# Patient Record
Sex: MALE | Race: WHITE | NOT HISPANIC OR LATINO | Employment: FULL TIME | URBAN - METROPOLITAN AREA
[De-identification: names, ages, dates, MRNs, and addresses within clinical notes are randomized per-mention and may not be internally consistent; named-entity substitution may affect disease eponyms.]

---

## 2017-02-16 ENCOUNTER — OFFICE VISIT (OUTPATIENT)
Dept: FAMILY MEDICINE | Facility: CLINIC | Age: 52
End: 2017-02-16
Payer: COMMERCIAL

## 2017-02-16 VITALS
WEIGHT: 275 LBS | TEMPERATURE: 96.7 F | HEART RATE: 68 BPM | DIASTOLIC BLOOD PRESSURE: 74 MMHG | HEIGHT: 72 IN | BODY MASS INDEX: 37.25 KG/M2 | SYSTOLIC BLOOD PRESSURE: 124 MMHG

## 2017-02-16 DIAGNOSIS — Z00.00 ROUTINE GENERAL MEDICAL EXAMINATION AT A HEALTH CARE FACILITY: Primary | ICD-10-CM

## 2017-02-16 DIAGNOSIS — Z79.899 CONTROLLED SUBSTANCE AGREEMENT SIGNED: ICD-10-CM

## 2017-02-16 DIAGNOSIS — F98.8 ADD (ATTENTION DEFICIT DISORDER): ICD-10-CM

## 2017-02-16 DIAGNOSIS — Z11.59 NEED FOR HEPATITIS C SCREENING TEST: ICD-10-CM

## 2017-02-16 DIAGNOSIS — Z12.5 SCREENING PSA (PROSTATE SPECIFIC ANTIGEN): ICD-10-CM

## 2017-02-16 DIAGNOSIS — E66.01 MORBID OBESITY, UNSPECIFIED OBESITY TYPE (H): ICD-10-CM

## 2017-02-16 DIAGNOSIS — Z13.6 CARDIOVASCULAR SCREENING; LDL GOAL LESS THAN 160: ICD-10-CM

## 2017-02-16 LAB
CHOLEST SERPL-MCNC: 188 MG/DL
GLUCOSE SERPL-MCNC: 133 MG/DL (ref 70–99)
HCV AB SERPL QL IA: NORMAL
HDLC SERPL-MCNC: 78 MG/DL
LDLC SERPL CALC-MCNC: 96 MG/DL
NONHDLC SERPL-MCNC: 110 MG/DL
PSA SERPL-ACNC: 0.48 UG/L (ref 0–4)
T4 FREE SERPL-MCNC: 1.34 NG/DL (ref 0.76–1.46)
TRIGL SERPL-MCNC: 69 MG/DL
TSH SERPL DL<=0.005 MIU/L-ACNC: 0.3 MU/L (ref 0.4–4)

## 2017-02-16 PROCEDURE — 82947 ASSAY GLUCOSE BLOOD QUANT: CPT | Performed by: FAMILY MEDICINE

## 2017-02-16 PROCEDURE — 36415 COLL VENOUS BLD VENIPUNCTURE: CPT | Performed by: FAMILY MEDICINE

## 2017-02-16 PROCEDURE — 86803 HEPATITIS C AB TEST: CPT | Performed by: FAMILY MEDICINE

## 2017-02-16 PROCEDURE — 80061 LIPID PANEL: CPT | Performed by: FAMILY MEDICINE

## 2017-02-16 PROCEDURE — G0103 PSA SCREENING: HCPCS | Performed by: FAMILY MEDICINE

## 2017-02-16 PROCEDURE — 99396 PREV VISIT EST AGE 40-64: CPT | Performed by: FAMILY MEDICINE

## 2017-02-16 PROCEDURE — 99212 OFFICE O/P EST SF 10 MIN: CPT | Mod: 25 | Performed by: FAMILY MEDICINE

## 2017-02-16 PROCEDURE — 84439 ASSAY OF FREE THYROXINE: CPT | Performed by: FAMILY MEDICINE

## 2017-02-16 PROCEDURE — 84443 ASSAY THYROID STIM HORMONE: CPT | Performed by: FAMILY MEDICINE

## 2017-02-16 RX ORDER — DEXTROAMPHETAMINE SACCHARATE, AMPHETAMINE ASPARTATE MONOHYDRATE, DEXTROAMPHETAMINE SULFATE AND AMPHETAMINE SULFATE 5; 5; 5; 5 MG/1; MG/1; MG/1; MG/1
20 CAPSULE, EXTENDED RELEASE ORAL DAILY
Qty: 30 CAPSULE | Refills: 0 | Status: SHIPPED | OUTPATIENT
Start: 2017-05-22 | End: 2017-05-04

## 2017-02-16 RX ORDER — DEXTROAMPHETAMINE SACCHARATE, AMPHETAMINE ASPARTATE, DEXTROAMPHETAMINE SULFATE AND AMPHETAMINE SULFATE 2.5; 2.5; 2.5; 2.5 MG/1; MG/1; MG/1; MG/1
TABLET ORAL
Qty: 30 TABLET | Refills: 0 | Status: SHIPPED | OUTPATIENT
Start: 2017-05-22 | End: 2017-05-04

## 2017-02-16 RX ORDER — DEXTROAMPHETAMINE SACCHARATE, AMPHETAMINE ASPARTATE, DEXTROAMPHETAMINE SULFATE AND AMPHETAMINE SULFATE 2.5; 2.5; 2.5; 2.5 MG/1; MG/1; MG/1; MG/1
TABLET ORAL
Qty: 30 TABLET | Refills: 0 | Status: SHIPPED | OUTPATIENT
Start: 2017-04-22 | End: 2017-02-16

## 2017-02-16 RX ORDER — DEXTROAMPHETAMINE SACCHARATE, AMPHETAMINE ASPARTATE MONOHYDRATE, DEXTROAMPHETAMINE SULFATE AND AMPHETAMINE SULFATE 5; 5; 5; 5 MG/1; MG/1; MG/1; MG/1
20 CAPSULE, EXTENDED RELEASE ORAL DAILY
Qty: 30 CAPSULE | Refills: 0 | Status: SHIPPED | OUTPATIENT
Start: 2017-03-22 | End: 2017-02-16

## 2017-02-16 RX ORDER — DEXTROAMPHETAMINE SACCHARATE, AMPHETAMINE ASPARTATE MONOHYDRATE, DEXTROAMPHETAMINE SULFATE AND AMPHETAMINE SULFATE 5; 5; 5; 5 MG/1; MG/1; MG/1; MG/1
20 CAPSULE, EXTENDED RELEASE ORAL DAILY
Qty: 30 CAPSULE | Refills: 0 | Status: SHIPPED | OUTPATIENT
Start: 2017-04-22 | End: 2017-02-16

## 2017-02-16 RX ORDER — DEXTROAMPHETAMINE SACCHARATE, AMPHETAMINE ASPARTATE, DEXTROAMPHETAMINE SULFATE AND AMPHETAMINE SULFATE 2.5; 2.5; 2.5; 2.5 MG/1; MG/1; MG/1; MG/1
TABLET ORAL
Qty: 30 TABLET | Refills: 0 | Status: SHIPPED | OUTPATIENT
Start: 2017-03-22 | End: 2017-02-16

## 2017-02-16 NOTE — MR AVS SNAPSHOT
After Visit Summary   2/16/2017    Orlando Holder    MRN: [id number]           Patient Information     Date Of Birth          1965        Visit Information        Provider Department      2/16/2017 7:00 AM Kay Ramirez MD [facility] [place]        Today's Diagnoses     Routine general medical examination at a health care facility    -  1    Need for hepatitis C screening test        CARDIOVASCULAR SCREENING; LDL GOAL LESS THAN 160        Screening PSA (prostate specific antigen)        ADD (attention deficit disorder)        Controlled substance agreement signed        BMI 37.0-37.9, adult        Morbid drug-induced obesity          Care Instructions      Preventive Health Recommendations  Male Ages 50 - 64    Yearly exam:             See your health care provider every year in order to  o   Review health changes.   o   Discuss preventive care.    o   Review your medicines if your doctor has prescribed any.     Have a cholesterol test every 5 years, or more frequently if you are at risk for high cholesterol/heart disease.     Have a diabetes test (fasting glucose) every three years. If you are at risk for diabetes, you should have this test more often.     Have a colonoscopy at age 50, or have a yearly FIT test (stool test). These exams will check for colon cancer.      Talk with your health care provider about whether or not a prostate cancer screening test (PSA) is right for you.    You should be tested each year for STDs (sexually transmitted diseases), if you re at risk.     Shots: Get a flu shot each year. Get a tetanus shot every 10 years.     Nutrition:    Eat at least 5 servings of fruits and vegetables daily.     Eat whole-grain bread, whole-wheat pasta and brown rice instead of white grains and rice.     Talk to your provider about Calcium and Vitamin D.     Lifestyle    Exercise for at least 150 minutes a week (30 minutes a day, 5 days a week). This will help  "you control your weight and prevent disease.     Limit alcohol to one drink per day.     No smoking.     Wear sunscreen to prevent skin cancer.     See your dentist every six months for an exam and cleaning.     See your eye doctor every 1 to 2 years.          Follow-ups after your visit        Additional Services     WEIGHT MANAGEMENT/ [facility] LIFESTYLE PROGRAM REFERRAL       To schedule an appointment, please call the [facility] at [phone].                  Who to contact     If you have questions or need follow up information about today's clinic visit or your schedule please contact [facility] [place] directly at [phone].  Normal or non-critical lab and imaging results will be communicated to you by [employer], letter or phone within 4 business days after the clinic has received the results. If you do not hear from us within 7 days, please contact the clinic through [employer] or phone. If you have a critical or abnormal lab result, we will notify you by phone as soon as possible.  Submit refill requests through [employer] or call your pharmacy and they will forward the refill request to us. Please allow 3 business days for your refill to be completed.          Additional Information About Your Visit        [employer] Information     [employer] gives you secure access to your electronic health record. If you see a primary care provider, you can also send messages to your care team and make appointments. If you have questions, please call your primary care clinic.  If you do not have a primary care provider, please call [phone] and they will assist you.        Care EveryWhere ID     This is your Care EveryWhere ID. This could be used by other organizations to access your [facility] medical records  [id number]        Your Vitals Were     Pulse Temperature Height BMI (Body Mass Index)          68 96.7  F (35.9  C) (Tympanic) 5' 11.65\" (1.82 m) 37.66 kg/m2         Blood Pressure from Last 3 " Encounters:   02/16/17 124/74   12/05/16 120/80   05/27/16 126/80    Weight from Last 3 Encounters:   02/16/17 275 lb (124.7 kg)   12/05/16 275 lb 12.8 oz (125.1 kg)   05/27/16 277 lb (125.6 kg)              We Performed the Following     Glucose     Hepatitis C Screen Reflex to HCV RNA Quant and Genotype     Lipid Profile (Chol, Trig, HDL, LDL calc)     Prostate spec antigen screen     TSH with free T4 reflex     WEIGHT MANAGEMENT/ [facility] LIFESTYLE PROGRAM REFERRAL          Today's Medication Changes          These changes are accurate as of: 2/16/17  7:50 AM.  If you have any questions, ask your nurse or doctor.               Start taking these medicines.        Dose/Directions    * amphetamine-dextroamphetamine 20 MG per 24 hr capsule   Commonly known as:  ADDERALL XR   Used for:  ADD (attention deficit disorder), Controlled substance agreement signed   Started by:  Kay Ramirez MD        Dose:  20 mg   Start taking on:  5/22/2017   Take 1 capsule (20 mg) by mouth daily   Quantity:  30 capsule   Refills:  0       * amphetamine-dextroamphetamine 10 MG per tablet   Commonly known as:  ADDERALL   Used for:  ADD (attention deficit disorder), Controlled substance agreement signed   Started by:  Kay Ramirez MD        Start taking on:  5/22/2017   Take one tablet at 1 pm   Quantity:  30 tablet   Refills:  0       * Notice:  This list has 2 medication(s) that are the same as other medications prescribed for you. Read the directions carefully, and ask your doctor or other care provider to review them with you.         Where to get your medicines      Some of these will need a paper prescription and others can be bought over the counter.  Ask your nurse if you have questions.     Bring a paper prescription for each of these medications     amphetamine-dextroamphetamine 10 MG per tablet    amphetamine-dextroamphetamine 20 MG per 24 hr capsule                Primary Care Provider Office Phone # Fax #     Kay Ramirez [phone]       [facility] [address] [place] [address] [address]  [place] [address]        Thank you!     [facility] you for [facility]  [facility] your [facility]. Our goal is always to provide you with excellent care. Hearing back from our patients is one way we can continue to improve our services. Please take a few minutes to complete the written survey that you may receive in the mail after your visit with us. Thank you!             Your Updated Medication List - Protect others around you: Learn how to safely use, store and throw away your medicines at www.disposemymeds.org.          This list is accurate as of: 2/16/17  7:50 AM.  Always use your most recent med list.                   Brand Name Dispense Instructions for use    ADVAIR DISKUS 500-50 MCG/DOSE diskus inhaler   Generic drug:  fluticasone-salmeterol     1 Inhaler    INHALE 1 PUFF BY MOUTH TWICE DAILY. RINSE/GARGLE AFTER USE       albuterol 108 (90 BASE) MCG/ACT Inhaler    albuterol    1 Inhaler    Inhale 1-2 puffs into the lungs 4 times daily as needed       * amphetamine-dextroamphetamine 20 MG per 24 hr capsule   Start taking on:  5/22/2017    ADDERALL XR    30 capsule    Take 1 capsule (20 mg) by mouth daily       * amphetamine-dextroamphetamine 10 MG per tablet   Start taking on:  5/22/2017    ADDERALL    30 tablet    Take one tablet at 1 pm       BIFIDOBACTERIUM BIFIDUM          fish oil-omega-3 fatty acids 1000 MG capsule      Take 2 g by mouth daily.       fluticasone 50 MCG/ACT spray    FLONASE    48 g    Spray 1-2 sprays into both nostrils daily       GLUCOSAMINE 1500 COMPLEX PO      Take 1 tablet by mouth daily.       montelukast 10 MG tablet    SINGULAIR    30 tablet    TAKE 1 TABLET (10 MG) BY MOUTH DAILY       Multi-vitamin Tabs tablet   Generic drug:  multivitamin, therapeutic with minerals      1 tab q  day       predniSONE 20 MG tablet    DELTASONE    20 tablet    Take 60 mg daily for  3 days, then 40 mg daily for 3 days, then 20 mg daily for 3 days, then 10 mg for 4 days       sildenafil 100 MG cap/tab    VIAGRA    10 tablet    Take 1 tablet by mouth daily as needed for erectile dysfunction.       VITAMIN D PO      Take  by mouth. 5000 units       WAL-ITIN 10 MG tablet   Generic drug:  loratadine     30    1 TABLET DAILY       * Notice:  This list has 2 medication(s) that are the same as other medications prescribed for you. Read the directions carefully, and ask your doctor or other care provider to review them with you.

## 2017-02-16 NOTE — PROGRESS NOTES
SUBJECTIVE:     CC: Orlando Holder is an 52 year old male who presents for preventative health visit.     Healthy Habits:    Do you get at least three servings of calcium containing foods daily (dairy, green leafy vegetables, etc.)? yes    Amount of exercise or daily activities, outside of work: Varies - 2-3 day(s) every 2 week    Problems taking medications regularly No    Medication side effects: No    Have you had an eye exam in the past two years? yes    Do you see a dentist twice per year? yes    Do you have sleep apnea, excessive snoring or daytime drowsiness?yes        PROBLEMS TO ADD ON...  Feels frustrated not be able to loose weight although admits he needs to improve his exercise routine, but he does try to eat well and his eating has not changed too much, but could use help. He was feeling more tired earlier although slightly better last couple of weeks. Had some  thyroid issue few yrs ago but he thinks his U/S was ok. Denies any constipation, etc except dry skin but it could be weather related      also here to do follow up med check  med's are working well and wish to continue . He likes to have 3 estephanie script to keep in pharmacy   ADD Follow-Up    Status since last visit: No change    Other associated symptoms:None    Complicating factors:     Significant life event: No     Current substance abuse: None    No flowsheet data found.  No flowsheet data found.     PHQ-9  English      PHQ-9   Any Language     GAD7         Today's PHQ-2 Score:   PHQ-2 ( 1999 Pf[employer]) 12/5/2016 5/27/2016   Q1: Little interest or pleasure in doing things 0 0   Q2: Feeling down, depressed or hopeless 0 0   PHQ-2 Score 0 0       Abuse: Current or Past(Physical, Sexual or Emotional)- NO  Do you feel safe in your environment - Yes    Social History   Substance Use Topics     Smoking status: Former Smoker     Packs/day: 0.50     Years: 20.00     Types: Cigarettes     Quit date: 3/26/2001     Smokeless tobacco: Never Used  "    Alcohol use 0.0 oz/week     0 Standard drinks or equivalent per week     The patient does not drink >3 drinks per day nor >7 drinks per week.    Last PSA:   PSA   Date Value Ref Range Status   01/16/2015 0.55 0 - 4 ug/L Final       Recent Labs   Lab Test  01/16/15   0735  08/16/13   0809   CHOL  153  172   HDL  60  52   LDL  74  106   TRIG  95  74   CHOLHDLRATIO  2.6  3.3       Reviewed orders with patient. Reviewed health maintenance and updated orders accordingly - Yes    All Histories reviewed and updated in Epic.  Past Medical History   Diagnosis Date     Allergic rhinitis, cause unspecified      Contact dermatitis and other eczema, due to unspecified cause      nummular/asteatotic     Herpes zoster 6/2015     right ear and neck     Moderate persistent asthma      Unspecified sinusitis (chronic)      Unspecified sleep apnea      C-Pap        ROS:  C: NEGATIVE for fever, chills, change in weight  I: NEGATIVE for worrisome rashes, moles or lesions  E: NEGATIVE for vision changes or irritation  ENT: NEGATIVE for ear, mouth and throat problems  R: NEGATIVE for significant cough or SOB  CV: NEGATIVE for chest pain, palpitations or peripheral edema  GI: NEGATIVE for nausea, abdominal pain, heartburn, or change in bowel habits   male: negative for dysuria, hematuria, decreased urinary stream, erectile dysfunction, urethral discharge  M: NEGATIVE for significant arthralgias or myalgia  N: NEGATIVE for weakness, dizziness or paresthesias  ENDOCRINE: POSITIVE  for weight gain and fatigue, slightly better now  and NEGATIVE for cold intolerance, constipation, HX thyroid disease and skin changes   PSYCHIATRIC: NEGATIVE for changes in mood or affect    Problem list, Medication list, Allergies, and Medical/Social/Surgical histories reviewed in [facility] and updated as appropriate.  OBJECTIVE:     /74  Pulse 68  Temp 96.7  F (35.9  C) (Tympanic)  Ht 5' 11.65\" (1.82 m)  Wt 275 lb (124.7 kg)  BMI 37.66 " kg/m2  EXAM:  GENERAL: healthy, alert and no distress  EYES: Eyes grossly normal to inspection, PERRL and conjunctivae and sclerae normal  HENT: ear canals and TM's normal, nose and mouth without ulcers or lesions  NECK: no adenopathy, no asymmetry, masses, or scars and thyroid normal to palpation  RESP: lungs clear to auscultation - no rales, rhonchi or wheezes  CV: regular rate and rhythm, normal S1 S2, no S3 or S4, no murmur, click or rub, no peripheral edema and peripheral pulses strong  ABDOMEN: soft, nontender, no hepatosplenomegaly, no masses and bowel sounds normal   (male): normal male genitalia without lesions or urethral discharge, no hernia  RECTAL: normal sphincter tone, no rectal masses, prostate normal size, smooth, nontender without nodules or masses  MS: no gross musculoskeletal defects noted, no edema  SKIN: no suspicious lesions or rashes  NEURO: Normal strength and tone, mentation intact and speech normal  PSYCH: mentation appears normal, affect normal/bright    ASSESSMENT/PLAN:     (Z00.00) Routine general medical examination at a health care facility  (primary encounter diagnosis)  Comment:   Plan: Hepatitis C Screen Reflex to HCV RNA Quant and         Genotype, Prostate spec antigen screen, Lipid         Profile (Chol, Trig, HDL, LDL calc), Glucose            (Z11.59) Need for hepatitis C screening test  Comment:   Plan: Hepatitis C Screen Reflex to HCV RNA Quant and         Genotype            (Z13.6) CARDIOVASCULAR SCREENING; LDL GOAL LESS THAN 160  Comment:   Plan: Lipid Profile (Chol, Trig, HDL, LDL calc)            (Z12.5) Screening PSA (prostate specific antigen)  Comment:   Plan: Prostate spec antigen screen            (F98.8) ADD (attention deficit disorder)  Comment: gave 3 months script, so should be good till June     Plan: amphetamine-dextroamphetamine (ADDERALL XR) 20         MG per 24 hr capsule,         amphetamine-dextroamphetamine (ADDERALL) 10 MG         per tablet,  "DISCONTINUED:         amphetamine-dextroamphetamine (ADDERALL XR) 20         MG per 24 hr capsule, DISCONTINUED:         amphetamine-dextroamphetamine (ADDERALL) 10 MG         per tablet, DISCONTINUED:         amphetamine-dextroamphetamine (ADDERALL XR) 20         MG per 24 hr capsule, DISCONTINUED:         amphetamine-dextroamphetamine (ADDERALL) 10 MG         per tablet            (Z79.899) Controlled substance agreement signed  Comment:   Plan: amphetamine-dextroamphetamine (ADDERALL XR) 20         MG per 24 hr capsule,         amphetamine-dextroamphetamine (ADDERALL) 10 MG         per tablet, DISCONTINUED:         amphetamine-dextroamphetamine (ADDERALL XR) 20         MG per 24 hr capsule, DISCONTINUED:         amphetamine-dextroamphetamine (ADDERALL) 10 MG         per tablet, DISCONTINUED:         amphetamine-dextroamphetamine (ADDERALL XR) 20         MG per 24 hr capsule, DISCONTINUED:         amphetamine-dextroamphetamine (ADDERALL) 10 MG         per tablet            (Z68.37) BMI 37.0-37.9, adult  Comment:   Plan: WEIGHT MANAGEMENT/ P LIFESTYLE PROGRAM         REFERRAL          :   (E66.01) Morbid obesity, unspecified obesity type (H)  Comment:   Plan: TSH with free T4 reflex, WEIGHT MANAGEMENT/ [facility]        LIFESTYLE PROGRAM REFERRAL  Healthy diet and exercise reviewed.  Risks of obesity discussed.  Encourage exercise.        COUNSELING:  Reviewed preventive health counseling, as reflected in patient instructions       Regular exercise       Healthy diet/nutrition       Vision screening         reports that he quit smoking about 15 years ago. His smoking use included Cigarettes. He has a 10.00 pack-year smoking history. He has never used smokeless tobacco.    Estimated body mass index is 37.66 kg/(m^2) as calculated from the following:    Height as of this encounter: 5' 11.65\" (1.82 m).    Weight as of this encounter: 275 lb (124.7 kg).   Weight management plan: Discussed healthy diet and exercise " guidelines and patient will follow up in 12 months in clinic to re-evaluate.      HCM issues discussed. Diet/ exercise discussed. Check labs , call patiens with results. follow up as needed.       Counseling Resources:  ATP IV Guidelines  Pooled Cohorts Equation Calculator  FRAX Risk Assessment  ICSI Preventive Guidelines  Dietary Guidelines for Americans, 2010  USDA's MyPlate  ASA Prophylaxis  Lung CA Screening    Kay Ramirez MD  [facility] [place]

## 2017-02-16 NOTE — NURSING NOTE
"Chief Complaint   Patient presents with     Physical     Fasting        Initial /74  Pulse 68  Temp 96.7  F (35.9  C) (Tympanic)  Ht 5' 11.65\" (1.82 m)  Wt 275 lb (124.7 kg)  BMI 37.66 kg/m2 Estimated body mass index is 37.66 kg/(m^2) as calculated from the following:    Height as of this encounter: 5' 11.65\" (1.82 m).    Weight as of this encounter: 275 lb (124.7 kg).  Medication Reconciliation: complete    Current Outpatient Prescriptions   Medication Sig Dispense Refill     ADVAIR DISKUS 500-50 MCG/DOSE diskus inhaler INHALE 1 PUFF BY MOUTH TWICE DAILY. RINSE/GARGLE AFTER USE 1 Inhaler 5     amphetamine-dextroamphetamine (ADDERALL XR) 20 MG per 24 hr capsule Take 1 capsule (20 mg) by mouth daily 30 capsule 0     amphetamine-dextroamphetamine (ADDERALL) 10 MG per tablet Take one tablet at 1 pm 30 tablet 0     albuterol (ALBUTEROL) 108 (90 BASE) MCG/ACT Inhaler Inhale 1-2 puffs into the lungs 4 times daily as needed 1 Inhaler 1     fluticasone (FLONASE) 50 MCG/ACT spray Spray 1-2 sprays into both nostrils daily 48 g 1     montelukast (SINGULAIR) 10 MG tablet TAKE 1 TABLET (10 MG) BY MOUTH DAILY 30 tablet 3     predniSONE (DELTASONE) 20 MG tablet Take 60 mg daily for 3 days, then 40 mg daily for 3 days, then 20 mg daily for 3 days, then 10 mg for 4 days 20 tablet 0     fish oil-omega-3 fatty acids (FISH OIL) 1000 MG capsule Take 2 g by mouth daily.       Glucosamine-Chondroit-Vit C-Mn (GLUCOSAMINE 1500 COMPLEX PO) Take 1 tablet by mouth daily.       Cholecalciferol (VITAMIN D PO) Take  by mouth. 5000 units       BIFIDOBACTERIUM BIFIDUM        sildenafil (VIAGRA) 100 MG tablet Take 1 tablet by mouth daily as needed for erectile dysfunction. 10 tablet 12     WAL-ITIN 10 MG OR TABS 1 TABLET DAILY 30 0     MULTI-VITAMIN OR TABS 1 tab q  day  0       Ren WILSON CMA    "

## 2017-02-16 NOTE — PATIENT INSTRUCTIONS
Preventive Health Recommendations  Male Ages 50   64    Yearly exam:             See your health care provider every year in order to  o   Review health changes.   o   Discuss preventive care.    o   Review your medicines if your doctor has prescribed any.     Have a cholesterol test every 5 years, or more frequently if you are at risk for high cholesterol/heart disease.     Have a diabetes test (fasting glucose) every three years. If you are at risk for diabetes, you should have this test more often.     Have a colonoscopy at age 50, or have a yearly FIT test (stool test). These exams will check for colon cancer.      Talk with your health care provider about whether or not a prostate cancer screening test (PSA) is right for you.    You should be tested each year for STDs (sexually transmitted diseases), if you re at risk.     Shots: Get a flu shot each year. Get a tetanus shot every 10 years.     Nutrition:    Eat at least 5 servings of fruits and vegetables daily.     Eat whole-grain bread, whole-wheat pasta and brown rice instead of white grains and rice.     Talk to your provider about Calcium and Vitamin D.     Lifestyle    Exercise for at least 150 minutes a week (30 minutes a day, 5 days a week). This will help you control your weight and prevent disease.     Limit alcohol to one drink per day.     No smoking.     Wear sunscreen to prevent skin cancer.     See your dentist every six months for an exam and cleaning.     See your eye doctor every 1 to 2 years.

## 2017-02-23 ENCOUNTER — TELEPHONE (OUTPATIENT)
Dept: FAMILY MEDICINE | Facility: CLINIC | Age: 52
End: 2017-02-23

## 2017-02-23 NOTE — TELEPHONE ENCOUNTER
Spoke with patient and informed that per result note patient should schedule OV to discuss labs and retest fasting blood sugar. Patient/ parent verbalized understanding and agrees with plan.    Mary Rosario RN   [facility] - Triage

## 2017-02-23 NOTE — TELEPHONE ENCOUNTER
Name of caller: Sterling  Relationship to Patient: Self    Reason for Call: Patient received results from his last appointment with PCP and he is unsure if he is supposed to schedule a follow up appointment or if he only needs repeat labs done. Please advise.     Best phone number to reach patient at is: [phone]  Ok to leave a message with medical info: Yes    Pharmacy preferred (if calling for a refill): NA

## 2017-03-01 ENCOUNTER — OFFICE VISIT (OUTPATIENT)
Dept: FAMILY MEDICINE | Facility: CLINIC | Age: 52
End: 2017-03-01
Payer: COMMERCIAL

## 2017-03-01 VITALS
OXYGEN SATURATION: 97 % | HEART RATE: 72 BPM | HEIGHT: 72 IN | WEIGHT: 276.13 LBS | DIASTOLIC BLOOD PRESSURE: 82 MMHG | BODY MASS INDEX: 37.4 KG/M2 | TEMPERATURE: 97.2 F | SYSTOLIC BLOOD PRESSURE: 116 MMHG

## 2017-03-01 DIAGNOSIS — R79.89 ABNORMAL THYROID BLOOD TEST: ICD-10-CM

## 2017-03-01 DIAGNOSIS — E66.9 OBESITY, UNSPECIFIED OBESITY SEVERITY, UNSPECIFIED OBESITY TYPE: ICD-10-CM

## 2017-03-01 DIAGNOSIS — R73.01 ELEVATED FASTING GLUCOSE: Primary | ICD-10-CM

## 2017-03-01 LAB — HBA1C MFR BLD: 4.9 % (ref 4.3–6)

## 2017-03-01 PROCEDURE — 83036 HEMOGLOBIN GLYCOSYLATED A1C: CPT | Performed by: FAMILY MEDICINE

## 2017-03-01 PROCEDURE — 99214 OFFICE O/P EST MOD 30 MIN: CPT | Performed by: FAMILY MEDICINE

## 2017-03-01 PROCEDURE — 36415 COLL VENOUS BLD VENIPUNCTURE: CPT | Performed by: FAMILY MEDICINE

## 2017-03-01 RX ORDER — OXYCODONE HYDROCHLORIDE 5 MG/1
TABLET ORAL
Refills: 0 | COMMUNITY
Start: 2017-02-19 | End: 2017-04-11

## 2017-03-01 NOTE — MR AVS SNAPSHOT
After Visit Summary   3/1/2017    Orlando Holder    MRN: [id number]           Patient Information     Date Of Birth          1965        Visit Information        Provider Department      3/1/2017 7:00 AM Kay Ramirez MD [facility]        Today's Diagnoses     Elevated fasting glucose    -  1    Abnormal thyroid blood test          Care Instructions      Prediabetes  You have been diagnosed with prediabetes. This means that the level of sugar (glucose) in your blood is too high. If you have prediabetes, you are at risk for developing type 2 diabetes. Type 2 diabetes is diagnosed when the level of glucose in the blood reaches a certain high level. With prediabetes, it hasn t reached this point yet, but it is higher than normal. It is vital to make lifestyle changes to lower your blood sugar, improve your health, and prevent diabetes. This sheet will tell you more.        Why worry about prediabetes?  Prediabetes is a disease where the body s cells have trouble using glucose in the blood for energy. As a result, too much glucose stays in the blood and can affect how your heart and blood vessels work. Without changes in diet and lifestyle, the problem can get worse. Once you have type 2 diabetes, it is chronic (ongoing) and needs to be managed for the rest of your life. Diabetes can harm the body and your health by damaging organs, such as your eyes and kidneys. It makes you more likely to have heart disease. And it can damage nerves and blood vessels.  Who is a risk for prediabetes?  The exact cause of prediabetes is not clear. But certain risk factors make a person more likely to have it. These include:    A family history of type 2 diabetes    Being overweight    Being over age 40    Having had gestational diabetes    Not being physically active    Being , -American, , , or   Diagnosing  prediabetes  Prediabetes has no symptoms. The only way to find it is with a blood test. You may have had one or both of these blood tests:    Fasting glucose test. Blood is taken and tested after you have fasted (not eaten) for at least 8 hours. A normal test result is 99 milligrams per deciliter (mg/dL) or lower. Prediabetes is 100 mg/dL to 125 mg/dL. Diabetes is 126 mg/dL or higher.    Glucose tolerance test. Your blood sugar is measured before and after you drink a very sugary liquid. A normal test result is 139 milligrams per deciliter (mg/dL) or lower. Prediabetes is 140 mg/dL to 199 mg/dL. Diabetes is 200 mg/dL or higher.    Hemoglobin A1c (HbA1c). Your HbA1c is normal if it is below 5.7%. Prediabetes is 5.7% to 6.4%. Diabetes is 6.5% or higher.   Treating prediabetes  The best way to treat prediabetes is to lose at least 5% to 7% of your current  weight and be more physically active by getting at least 30 minutes of exercise 5 days a week. These changes help the body s cells use blood sugar better. Even a small amount of weight loss can help. Work with your healthcare provider to make a plan to eat well and be more active. Keep in mind that small changes can add up. Other changes in your lifestyle may make you less likely to develop diabetes. Your healthcare provider can talk with you about these.  Follow-up  If it is untreated, prediabetes can turn into diabetes. This is a serious health condition. Take steps to stop this from happening. Follow the treatment plan you have been given. You may have your blood glucose tested again in about 12 to 18 months.  Symptoms of diabetes  Most people do not have symptoms. But let your healthcare provider know if you have any of the following:    Always feel very tired    Feel very thirsty or hungry much of the time    Have to urinate often    Lose weight for no reason    Feel numbness or tingling in your fingers or toes    Have cuts or bruises that don t seem to  heal    Have blurry vision     6056-3081 [employer]. [address] [place] [address], [place] [address]. All rights reserved. This information is not intended as a substitute for professional medical care. Always follow your healthcare professional's instructions.              Follow-ups after your visit        Future tests that were ordered for you today     Open Future Orders        Priority Expected Expires Ordered    Glucose Routine  5/1/2017 3/1/2017    TSH Routine  7/1/2017 3/1/2017    T3, Free Routine  7/1/2017 3/1/2017    T4, free Routine  7/1/2017 3/1/2017            Who to contact     If you have questions or need follow up information about today's clinic visit or your schedule please contact [facility] [place] directly at [phone].  Normal or non-critical lab and imaging results will be communicated to you by MyChart, letter or phone within 4 business days after the clinic has received the results. If you do not hear from us within 7 days, please contact the clinic through [employer] or phone. If you have a critical or abnormal lab result, we will notify you by phone as soon as possible.  Submit refill requests through [employer] or call your pharmacy and they will forward the refill request to us. Please allow 3 business days for your refill to be completed.          Additional Information About Your Visit        [employer] Information     [employer] gives you secure access to your electronic health record. If you see a primary care provider, you can also send messages to your care team and make appointments. If you have questions, please call your primary care clinic.  If you do not have a primary care provider, please call [phone] and they will assist you.        Care EveryWhere ID     This is your Care EveryWhere ID. This could be used by other organizations to access your [place] medical records  [id number]        Your Vitals Were     Pulse Temperature Height Pulse Oximetry BMI  "(Body Mass Index)       72 97.2  F (36.2  C) (Tympanic) 5' 11.5\" (1.816 m) 97% 37.97 kg/m2        Blood Pressure from Last 3 Encounters:   03/01/17 116/82   02/16/17 124/74   12/05/16 120/80    Weight from Last 3 Encounters:   03/01/17 276 lb 2 oz (125.2 kg)   02/16/17 275 lb (124.7 kg)   12/05/16 275 lb 12.8 oz (125.1 kg)              We Performed the Following     Hemoglobin A1c          Today's Medication Changes          These changes are accurate as of: 3/1/17  7:28 AM.  If you have any questions, ask your nurse or doctor.               Stop taking these medicines if you haven't already. Please contact your care team if you have questions.     predniSONE 20 MG tablet   Commonly known as:  DELTASONE   Stopped by:  Kay Ramirez MD                    Primary Care Provider Office Phone # Fax #    Kay Ramirez [phone]       [address]  [place] [address]        Thank you!     Thank you for choosing [facility]  for your care. Our goal is always to provide you with excellent care. Hearing back from our patients is one way we can continue to improve our services. Please take a few minutes to complete the written survey that you may receive in the mail after your visit with us. Thank you!             Your Updated Medication List - Protect others around you: Learn how to safely use, store and throw away your medicines at www.disposemymeds.org.          This list is accurate as of: 3/1/17  7:28 AM.  Always use your most recent med list.                   Brand Name Dispense Instructions for use    ADVAIR DISKUS 500-50 MCG/DOSE diskus inhaler   Generic drug:  fluticasone-salmeterol     1 Inhaler    INHALE 1 PUFF BY MOUTH TWICE DAILY. RINSE/GARGLE AFTER USE       albuterol 108 (90 BASE) MCG/ACT Inhaler    albuterol    1 Inhaler    Inhale 1-2 puffs into the lungs 4 times daily as needed       * amphetamine-dextroamphetamine 20 MG per " 24 hr capsule   Start taking on:  5/22/2017    ADDERALL XR    30 capsule    Take 1 capsule (20 mg) by mouth daily       * amphetamine-dextroamphetamine 10 MG per tablet   Start taking on:  5/22/2017    ADDERALL    30 tablet    Take one tablet at 1 pm       BIFIDOBACTERIUM BIFIDUM          fish oil-omega-3 fatty acids 1000 MG capsule      Take 2 g by mouth daily.       fluticasone 50 MCG/ACT spray    FLONASE    48 g    Spray 1-2 sprays into both nostrils daily       GLUCOSAMINE 1500 COMPLEX PO      Take 1 tablet by mouth daily.       montelukast 10 MG tablet    SINGULAIR    30 tablet    TAKE 1 TABLET (10 MG) BY MOUTH DAILY       Multi-vitamin Tabs tablet   Generic drug:  multivitamin, therapeutic with minerals      1 tab q  day       oxyCODONE 5 MG IR tablet    ROXICODONE     TAKE 1 TABLET EVERY 4 HOURS AS NEEDED FOR PAIN       sildenafil 100 MG cap/tab    VIAGRA    10 tablet    Take 1 tablet by mouth daily as needed for erectile dysfunction.       tiZANidine 4 MG tablet    ZANAFLEX     TAKE 1 OR 2 TABLETS EVERY 8 HOURS AS NEEDED.       VITAMIN D PO      Take  by mouth. 5000 units       WAL-ITIN 10 MG tablet   Generic drug:  loratadine     30    1 TABLET DAILY       * Notice:  This list has 2 medication(s) that are the same as other medications prescribed for you. Read the directions carefully, and ask your doctor or other care provider to review them with you.

## 2017-03-01 NOTE — PROGRESS NOTES
SUBJECTIVE:                                                    Orlando Holder is a 52 year old male who presents to clinic today for the following health issues:    Patient is here today to follow up on lab work from LOV 2/16/17.   His lab work came back, glucose is elevated - 133,  and thyroid is low - 0.30.   He is fasting this morning since 7:00pm last night.       High fasting glucose  Follow-up           denies any S/S of Diabetic concerns:      Symptoms of hyperglycemia  No frequency, dry mouth,blurred vision etc. No FAM hx of DM. Admits that he needs to loose weight          Abnormal thyroid Follow-up         Recently thyroid scree showed slightly depressed TSH, T4 was normal     Since last visit, patient describes none of  following symptoms: Weight stable, no hair loss, no skin changes, no constipation or loose stools , no palpitation tremors       Amount of exercise or physical activity: trying to exercise more now also paying attention to eating     Problems taking medications regularly: No    Medication side effects: none  Diet: low fat/cholesterol and low carbohydrate           Problem list and histories reviewed & adjusted, as indicated.  Additional history: as documented    Patient Active Problem List   Diagnosis     Nonspecific abnormal results of liver function study     Difficulty concentrating     Nevus     CARDIOVASCULAR SCREENING; LDL GOAL LESS THAN 160     Erectile dysfunction     Common wart     Achilles tendonitis     Anal fissure     Obesity     History of colonic polyps     ADD (attention deficit disorder)     Controlled substance agreement signed     Seasonal allergic rhinitis, unspecified allergic rhinitis trigger     Moderate persistent asthma without complication     Past Surgical History   Procedure Laterality Date     Hc repair incisional hernia,reducible  1997     Hernia Repair,  inguinal, Unilateral x2 on rt.     C nonspecific procedure  2004     repair deviated septum      "  Social History   Substance Use Topics     Smoking status: Former Smoker     Packs/day: 0.50     Years: 20.00     Types: Cigarettes     Quit date: 3/26/2001     Smokeless tobacco: Never Used     Alcohol use 0.0 oz/week     0 Standard drinks or equivalent per week     Family History   Problem Relation Age of Onset     Hypertension Paternal Grandmother      C.A.D. Paternal Grandmother      Arthritis Paternal Grandmother      DIABETES No family hx of      Prostate Cancer No family hx of      Cancer - colorectal No family hx of            Reviewed and updated as needed this visit by clinical staff       Reviewed and updated as needed this visit by Provider         ROS:  Constitutional, HEENT, cardiovascular, pulmonary, GI, , musculoskeletal, neuro, skin, endocrine and psych systems are negative, except as otherwise noted.    OBJECTIVE:                                                    /82 (BP Location: Left arm, Patient Position: Chair, Cuff Size: Adult Large)  Pulse 72  Temp 97.2  F (36.2  C) (Tympanic)  Ht 5' 11.5\" (1.816 m)  Wt 276 lb 2 oz (125.2 kg)  SpO2 97%  BMI 37.97 kg/m2  Body mass index is 37.97 kg/(m^2).  GENERAL: healthy, alert and no distress  NECK: no adenopathy and thyroid normal to palpation  RESP: lungs clear to auscultation - no rales, rhonchi or wheezes  CV: regular rate and rhythm, normal S1 S2, no S3 or S4, no murmur, click or rub, no peripheral edema and peripheral pulses strong  MS: no gross musculoskeletal defects noted, no edema         ASSESSMENT/PLAN:                                                      (R73.01) Elevated fasting glucose  (primary encounter diagnosis)  Comment:   Plan: Glucose, Hemoglobin A1c         Discussed cares, diet/ exercise . Talked about DM concerns, management , health risk etc. Check lab, call pt with results. Follow up as needed          (R94.6) Abnormal thyroid blood test  Comment: low TSH, T4 is normal, pt asymptomatic   Plan: TSH, T3, Free, T4, free     "  Discussed results, s/s of thyroid problem etc . He will do repeat labs in 2-3 months, sooner if problem or concerns         (Z68.38) BMI 38.0-38.9,adult  Comment:   Plan:     (E66.9) Obesity, unspecified obesity severity, unspecified obesity type  Comment:   Plan: Healthy diet and exercise reviewed.  Risks of obesity discussed.  Encourage exercise.      Patient and his wife  expressed understanding and agreement with treatment plan. All patient's questions were answered, will let me know if has more later.    Kay Ramirez MD  [facility]

## 2017-03-01 NOTE — NURSING NOTE
"Chief Complaint   Patient presents with     RECHECK       Initial /82 (BP Location: Left arm, Patient Position: Chair, Cuff Size: Adult Large)  Pulse 72  Temp 97.2  F (36.2  C) (Tympanic)  Ht 5' 11.5\" (1.816 m)  Wt 276 lb 2 oz (125.2 kg)  SpO2 97%  BMI 37.97 kg/m2 Estimated body mass index is 37.97 kg/(m^2) as calculated from the following:    Height as of this encounter: 5' 11.5\" (1.816 m).    Weight as of this encounter: 276 lb 2 oz (125.2 kg).  Medication Reconciliation: complete Lisbeth Hebert MA      "

## 2017-03-01 NOTE — PATIENT INSTRUCTIONS
Prediabetes  You have been diagnosed with prediabetes. This means that the level of sugar (glucose) in your blood is too high. If you have prediabetes, you are at risk for developing type 2 diabetes. Type 2 diabetes is diagnosed when the level of glucose in the blood reaches a certain high level. With prediabetes, it hasn t reached this point yet, but it is higher than normal. It is vital to make lifestyle changes to lower your blood sugar, improve your health, and prevent diabetes. This sheet will tell you more.        Why worry about prediabetes?  Prediabetes is a disease where the body s cells have trouble using glucose in the blood for energy. As a result, too much glucose stays in the blood and can affect how your heart and blood vessels work. Without changes in diet and lifestyle, the problem can get worse. Once you have type 2 diabetes, it is chronic (ongoing) and needs to be managed for the rest of your life. Diabetes can harm the body and your health by damaging organs, such as your eyes and kidneys. It makes you more likely to have heart disease. And it can damage nerves and blood vessels.  Who is a risk for prediabetes?  The exact cause of prediabetes is not clear. But certain risk factors make a person more likely to have it. These include:    A family history of type 2 diabetes    Being overweight    Being over age 40    Having had gestational diabetes    Not being physically active    Being , -American, , , or   Diagnosing prediabetes  Prediabetes has no symptoms. The only way to find it is with a blood test. You may have had one or both of these blood tests:    Fasting glucose test. Blood is taken and tested after you have fasted (not eaten) for at least 8 hours. A normal test result is 99 milligrams per deciliter (mg/dL) or lower. Prediabetes is 100 mg/dL to 125 mg/dL. Diabetes is 126 mg/dL or higher.    Glucose tolerance test. Your blood  sugar is measured before and after you drink a very sugary liquid. A normal test result is 139 milligrams per deciliter (mg/dL) or lower. Prediabetes is 140 mg/dL to 199 mg/dL. Diabetes is 200 mg/dL or higher.    Hemoglobin A1c (HbA1c). Your HbA1c is normal if it is below 5.7%. Prediabetes is 5.7% to 6.4%. Diabetes is 6.5% or higher.   Treating prediabetes  The best way to treat prediabetes is to lose at least 5% to 7% of your current  weight and be more physically active by getting at least 30 minutes of exercise 5 days a week. These changes help the body s cells use blood sugar better. Even a small amount of weight loss can help. Work with your healthcare provider to make a plan to eat well and be more active. Keep in mind that small changes can add up. Other changes in your lifestyle may make you less likely to develop diabetes. Your healthcare provider can talk with you about these.  Follow-up  If it is untreated, prediabetes can turn into diabetes. This is a serious health condition. Take steps to stop this from happening. Follow the treatment plan you have been given. You may have your blood glucose tested again in about 12 to 18 months.  Symptoms of diabetes  Most people do not have symptoms. But let your healthcare provider know if you have any of the following:    Always feel very tired    Feel very thirsty or hungry much of the time    Have to urinate often    Lose weight for no reason    Feel numbness or tingling in your fingers or toes    Have cuts or bruises that don t seem to heal    Have blurry vision     3690-2624 [employer]. [address] [place] [address], [place] [address]. All rights reserved. This information is not intended as a substitute for professional medical care. Always follow your healthcare professional's instructions.

## 2017-03-02 DIAGNOSIS — J45.40 MODERATE PERSISTENT ASTHMA WITHOUT COMPLICATION: ICD-10-CM

## 2017-03-02 DIAGNOSIS — J30.9 ALLERGIC RHINITIS: ICD-10-CM

## 2017-03-03 RX ORDER — MONTELUKAST SODIUM 10 MG/1
TABLET ORAL
Qty: 90 TABLET | Refills: 1 | Status: SHIPPED | OUTPATIENT
Start: 2017-03-03 | End: 2017-05-04

## 2017-03-03 NOTE — TELEPHONE ENCOUNTER
Singulair       Last Written Prescription Date: 10/6/16  Last Fill Quantity: 30, # refills: 3    Last Office Visit with G, P or [facility] prescribing provider:  3/1/17   Future Office Visit:       Date of Last Asthma Action Plan Letter:   Asthma Action Plan Q1 Year    Topic Date Due     Asthma Action Plan - yearly  07/25/2017      Asthma Control Test:   ACT Total Scores 12/5/2016   ACT TOTAL SCORE (Goal Greater than or Equal to 20) 25   In the past 12 months, how many times did you visit the emergency room for your asthma without being admitted to the hospital? 0   In the past 12 months, how many times were you hospitalized overnight because of your asthma? 0       Date of Last Spirometry Test:   No results found for this or any previous visit.    Charis Abdalla CMA

## 2017-03-03 NOTE — TELEPHONE ENCOUNTER
Prescription approved per [facility], [facility] or [facility] refill protocol.  Arline Farias RN  [employer]

## 2017-03-13 ENCOUNTER — TELEPHONE (OUTPATIENT)
Dept: FAMILY MEDICINE | Facility: CLINIC | Age: 52
End: 2017-03-13

## 2017-03-13 NOTE — TELEPHONE ENCOUNTER
He is on same med's for a while, not sure why it is not covered now. Check with pt/ pharmacy for alternate ? Or may consider PA, but need to know what else he has failed previously

## 2017-03-14 NOTE — TELEPHONE ENCOUNTER
Called patient pharmacy [facility] to ask why PA is needed when the patient has been on the med for some time. The representative I spoke to informed me that all narcotics including amphetamine is going to need a PA. He is faxing it over to start one for patient.     Mare Marsh, NATAN

## 2017-03-16 NOTE — TELEPHONE ENCOUNTER
PT calling in to check on status of medication. I let him know we have started the process but that it can take a few business days to complete. Patient states he is going out of town on Sunday and will be taking his last pill that day. Please call patient when complete. OK to leave a detailed message.    Trisha Quigley  Patient Rep

## 2017-03-22 NOTE — TELEPHONE ENCOUNTER
PA [id number] approved for Amphetamine XR  20 mg and  [place] [id number] for Amphetamine 10 mg through 3/21/2018.     [facility] pharmacy notified. Doc sent to scan.       Mare Marsh CMA

## 2017-04-11 ENCOUNTER — APPOINTMENT (OUTPATIENT)
Dept: FAMILY MEDICINE | Facility: CLINIC | Age: 52
End: 2017-04-11
Payer: COMMERCIAL

## 2017-04-11 ENCOUNTER — OFFICE VISIT (OUTPATIENT)
Dept: FAMILY MEDICINE | Facility: CLINIC | Age: 52
End: 2017-04-11
Payer: COMMERCIAL

## 2017-04-11 VITALS
HEART RATE: 67 BPM | WEIGHT: 277 LBS | OXYGEN SATURATION: 99 % | SYSTOLIC BLOOD PRESSURE: 136 MMHG | TEMPERATURE: 97 F | DIASTOLIC BLOOD PRESSURE: 80 MMHG | BODY MASS INDEX: 38.1 KG/M2

## 2017-04-11 DIAGNOSIS — R06.2 WHEEZING: ICD-10-CM

## 2017-04-11 DIAGNOSIS — J45.909 ASTHMA DUE TO SEASONAL ALLERGIES: ICD-10-CM

## 2017-04-11 DIAGNOSIS — J45.40 MODERATE PERSISTENT ASTHMA WITHOUT COMPLICATION: Primary | ICD-10-CM

## 2017-04-11 PROCEDURE — 99214 OFFICE O/P EST MOD 30 MIN: CPT | Performed by: FAMILY MEDICINE

## 2017-04-11 RX ORDER — PREDNISONE 20 MG/1
40 TABLET ORAL DAILY
Qty: 10 TABLET | Refills: 0 | Status: SHIPPED | OUTPATIENT
Start: 2017-04-11 | End: 2017-04-16

## 2017-04-11 NOTE — MR AVS SNAPSHOT
After Visit Summary   4/11/2017    Orlando Holder    MRN: [id number]           Patient Information     Date Of Birth          1965        Visit Information        Provider Department      4/11/2017 1:40 PM Charlie Kingsley MD [facility] [place]        Today's Diagnoses     Moderate persistent asthma without complication    -  1    Asthma due to seasonal allergies        Wheezing           Follow-ups after your visit        Who to contact     If you have questions or need follow up information about today's clinic visit or your schedule please contact [facility] [place] directly at [phone].  Normal or non-critical lab and imaging results will be communicated to you by Invenergyhart, letter or phone within 4 business days after the clinic has received the results. If you do not hear from us within 7 days, please contact the clinic through Freevert or phone. If you have a critical or abnormal lab result, we will notify you by phone as soon as possible.  Submit refill requests through [employer] or call your pharmacy and they will forward the refill request to us. Please allow 3 business days for your refill to be completed.          Additional Information About Your Visit        MyChart Information     [employer] gives you secure access to your electronic health record. If you see a primary care provider, you can also send messages to your care team and make appointments. If you have questions, please call your primary care clinic.  If you do not have a primary care provider, please call [phone] and they will assist you.        Care EveryWhere ID     This is your Care EveryWhere ID. This could be used by other organizations to access your [place] medical records  [id number]        Your Vitals Were     Pulse Temperature Pulse Oximetry BMI (Body Mass Index)          67 97  F (36.1  C) (Tympanic) 99% 38.1 kg/m2         Blood Pressure from Last 3 Encounters:   04/11/17 136/80    03/01/17 116/82   02/16/17 124/74    Weight from Last 3 Encounters:   04/11/17 277 lb (125.6 kg)   03/01/17 276 lb 2 oz (125.2 kg)   02/16/17 275 lb (124.7 kg)              Today, you had the following     No orders found for display         Today's Medication Changes          These changes are accurate as of: 4/11/17  2:10 PM.  If you have any questions, ask your nurse or doctor.               Start taking these medicines.        Dose/Directions    predniSONE 20 MG tablet   Commonly known as:  DELTASONE   Used for:  Asthma due to seasonal allergies, Moderate persistent asthma without complication   Started by:  Charlie Kingsley MD        Dose:  40 mg   Take 2 tablets (40 mg) by mouth daily for 5 days   Quantity:  10 tablet   Refills:  0            Where to get your medicines      These medications were sent to [facility]/pharmacy #[id number] - [place], [place] - [address] [facility]  [address] [place] [address] [facility] [place] [address]     Phone:  [phone]     predniSONE 20 MG tablet                Primary Care Provider Office Phone # Fax #    Kay Ramirez [phone]       [address]  [place] [address]        Thank you!     Thank you for choosing [facility]  for your care. Our goal is always to provide you with excellent care. Hearing back from our patients is one way we can continue to improve our services. Please take a few minutes to complete the written survey that you may receive in the mail after your visit with us. Thank you!             Your Updated Medication List - Protect others around you: Learn how to safely use, store and throw away your medicines at www.disposemymeds.org.          This list is accurate as of: 4/11/17  2:10 PM.  Always use your most recent med list.                   Brand Name Dispense Instructions for use    ADVAIR DISKUS 500-50 MCG/DOSE diskus inhaler   Generic drug:  fluticasone-salmeterol     1 Inhaler    INHALE 1 PUFF  BY MOUTH TWICE DAILY. RINSE/GARGLE AFTER USE       albuterol 108 (90 BASE) MCG/ACT Inhaler    albuterol    1 Inhaler    Inhale 1-2 puffs into the lungs 4 times daily as needed       * amphetamine-dextroamphetamine 20 MG per 24 hr capsule   Start taking on:  5/22/2017    ADDERALL XR    30 capsule    Take 1 capsule (20 mg) by mouth daily       * amphetamine-dextroamphetamine 10 MG per tablet   Start taking on:  5/22/2017    ADDERALL    30 tablet    Take one tablet at 1 pm       BIFIDOBACTERIUM BIFIDUM          fish oil-omega-3 fatty acids 1000 MG capsule      Take 2 g by mouth daily.       fluticasone 50 MCG/ACT spray    FLONASE    48 g    Spray 1-2 sprays into both nostrils daily       GLUCOSAMINE 1500 COMPLEX PO      Take 1 tablet by mouth daily.       montelukast 10 MG tablet    SINGULAIR    90 tablet    TAKE 1 TABLET (10 MG) BY MOUTH DAILY       Multi-vitamin Tabs tablet   Generic drug:  multivitamin, therapeutic with minerals      1 tab q  day       predniSONE 20 MG tablet    DELTASONE    10 tablet    Take 2 tablets (40 mg) by mouth daily for 5 days       sildenafil 100 MG cap/tab    VIAGRA    10 tablet    Take 1 tablet by mouth daily as needed for erectile dysfunction.       VITAMIN D PO      Take  by mouth. 5000 units       WAL-ITIN 10 MG tablet   Generic drug:  loratadine     30    1 TABLET DAILY       * Notice:  This list has 2 medication(s) that are the same as other medications prescribed for you. Read the directions carefully, and ask your doctor or other care provider to review them with you.

## 2017-04-11 NOTE — PROGRESS NOTES
SUBJECTIVE:                                                    Orlando Holder is a 52 year old male who presents to clinic today for the following health issues:    Asthma Follow-Up - increased albuterol use   Moderate amount of wheezing,despite taking his medication, no fever, chills., no productive cough.  Was ACT completed today?    Yes    ACT Total Scores 4/11/2017   ACT TOTAL SCORE (Goal Greater than or Equal to 20) 21   In the past 12 months, how many times did you visit the emergency room for your asthma without being admitted to the hospital? 0   In the past 12 months, how many times were you hospitalized overnight because of your asthma? 0       Recent asthma triggers that patient is dealing with: weather changes          Problem list and histories reviewed & adjusted, as indicated.  Additional history: as documented    Patient Active Problem List   Diagnosis     Nonspecific abnormal results of liver function study     Difficulty concentrating     Nevus     CARDIOVASCULAR SCREENING; LDL GOAL LESS THAN 160     Erectile dysfunction     Common wart     Achilles tendonitis     Anal fissure     Obesity     History of colonic polyps     ADD (attention deficit disorder)     Controlled substance agreement signed     Seasonal allergic rhinitis, unspecified allergic rhinitis trigger     Moderate persistent asthma without complication     BMI 38.0-38.9,adult     Obesity, unspecified obesity severity, unspecified obesity type     Abnormal thyroid blood test     Elevated fasting glucose     Past Surgical History:   Procedure Laterality Date     C NONSPECIFIC PROCEDURE  2004    repair deviated septum     HC REPAIR INCISIONAL HERNIA,REDUCIBLE  1997    Hernia Repair,  inguinal, Unilateral x2 on rt.       Social History   Substance Use Topics     Smoking status: Former Smoker     Packs/day: 0.50     Years: 20.00     Types: Cigarettes     Quit date: 3/26/2001     Smokeless tobacco: Never Used     Alcohol use 0.0  oz/week     0 Standard drinks or equivalent per week     Family History   Problem Relation Age of Onset     Hypertension Paternal Grandmother      C.A.D. Paternal Grandmother      Arthritis Paternal Grandmother      DIABETES No family hx of      Prostate Cancer No family hx of      Cancer - colorectal No family hx of          Current Outpatient Prescriptions   Medication Sig Dispense Refill     predniSONE (DELTASONE) 20 MG tablet Take 2 tablets (40 mg) by mouth daily for 5 days 10 tablet 0     montelukast (SINGULAIR) 10 MG tablet TAKE 1 TABLET (10 MG) BY MOUTH DAILY 90 tablet 1     [START ON 5/22/2017] amphetamine-dextroamphetamine (ADDERALL XR) 20 MG per 24 hr capsule Take 1 capsule (20 mg) by mouth daily 30 capsule 0     [START ON 5/22/2017] amphetamine-dextroamphetamine (ADDERALL) 10 MG per tablet Take one tablet at 1 pm 30 tablet 0     ADVAIR DISKUS 500-50 MCG/DOSE diskus inhaler INHALE 1 PUFF BY MOUTH TWICE DAILY. RINSE/GARGLE AFTER USE 1 Inhaler 5     albuterol (ALBUTEROL) 108 (90 BASE) MCG/ACT Inhaler Inhale 1-2 puffs into the lungs 4 times daily as needed 1 Inhaler 1     fluticasone (FLONASE) 50 MCG/ACT spray Spray 1-2 sprays into both nostrils daily 48 g 1     fish oil-omega-3 fatty acids (FISH OIL) 1000 MG capsule Take 2 g by mouth daily.       Glucosamine-Chondroit-Vit C-Mn (GLUCOSAMINE 1500 COMPLEX PO) Take 1 tablet by mouth daily.       Cholecalciferol (VITAMIN D PO) Take  by mouth. 5000 units       BIFIDOBACTERIUM BIFIDUM        sildenafil (VIAGRA) 100 MG tablet Take 1 tablet by mouth daily as needed for erectile dysfunction. 10 tablet 12     WAL-ITIN 10 MG OR TABS 1 TABLET DAILY 30 0     MULTI-VITAMIN OR TABS 1 tab q  day  0       Reviewed and updated as needed this visit by clinical staff  Tobacco  Allergies  Meds       Reviewed and updated as needed this visit by Provider         ROS:  C: NEGATIVE for fever, chills, change in weight  E/M: NEGATIVE for ear, mouth and throat problems  RESP:POSITIVE  for cough-non productive  CV: NEGATIVE for chest pain, palpitations or peripheral edema    OBJECTIVE:                                                    /80  Pulse 67  Temp 97  F (36.1  C) (Tympanic)  Wt 277 lb (125.6 kg)  SpO2 99%  BMI 38.1 kg/m2  Body mass index is 38.1 kg/(m^2).   GENERAL: healthy, alert, well nourished, well hydrated, no distress  HENT: ear canals- normal; TMs- normal; Nose- normal; Mouth- no ulcers, no lesions  NECK: no tenderness, no adenopathy, no asymmetry, no masses, no stiffness; thyroid- normal to palpation  RESP: lungs clear to auscultation - no rales, no rhonchi, no wheezes  CV: regular rates and rhythm, normal S1 S2, no S3 or S4 and no murmur, no click or rub -       ASSESSMENT/PLAN:                                                        ICD-10-CM    1. Moderate persistent asthma without complication J45.40 predniSONE (DELTASONE) 20 MG tablet   2. Asthma due to seasonal allergies J45.909 predniSONE (DELTASONE) 20 MG tablet   3. Wheezing R06.2        Patient is having mild wheezing with cough, he is taking the his inhalers.  will add prednisone to his regimen.  Warning signs were discussed with the patient.    Charlie Kingsley MD  [facility]

## 2017-04-12 ASSESSMENT — ASTHMA QUESTIONNAIRES: ACT_TOTALSCORE: 21

## 2017-05-04 ENCOUNTER — OFFICE VISIT (OUTPATIENT)
Dept: FAMILY MEDICINE | Facility: CLINIC | Age: 52
End: 2017-05-04
Payer: COMMERCIAL

## 2017-05-04 VITALS
OXYGEN SATURATION: 97 % | TEMPERATURE: 97 F | SYSTOLIC BLOOD PRESSURE: 113 MMHG | HEART RATE: 73 BPM | DIASTOLIC BLOOD PRESSURE: 77 MMHG | HEIGHT: 72 IN | BODY MASS INDEX: 37.38 KG/M2 | WEIGHT: 276 LBS

## 2017-05-04 DIAGNOSIS — F98.8 ADD (ATTENTION DEFICIT DISORDER): ICD-10-CM

## 2017-05-04 DIAGNOSIS — J45.40 MODERATE PERSISTENT ASTHMA, UNCOMPLICATED: ICD-10-CM

## 2017-05-04 DIAGNOSIS — J30.89 SEASONAL ALLERGIC RHINITIS DUE TO OTHER ALLERGIC TRIGGER: Primary | ICD-10-CM

## 2017-05-04 DIAGNOSIS — Z79.899 CONTROLLED SUBSTANCE AGREEMENT SIGNED: ICD-10-CM

## 2017-05-04 PROCEDURE — 99214 OFFICE O/P EST MOD 30 MIN: CPT | Performed by: FAMILY MEDICINE

## 2017-05-04 RX ORDER — FLUTICASONE PROPIONATE 50 MCG
1-2 SPRAY, SUSPENSION (ML) NASAL DAILY
Qty: 3 BOTTLE | Refills: 3 | Status: SHIPPED | OUTPATIENT
Start: 2017-05-04 | End: 2019-09-26

## 2017-05-04 RX ORDER — ALBUTEROL SULFATE 90 UG/1
1-2 AEROSOL, METERED RESPIRATORY (INHALATION) 4 TIMES DAILY PRN
Qty: 1 INHALER | Refills: 1 | Status: SHIPPED | OUTPATIENT
Start: 2017-05-04 | End: 2017-07-14

## 2017-05-04 RX ORDER — DEXTROAMPHETAMINE SACCHARATE, AMPHETAMINE ASPARTATE, DEXTROAMPHETAMINE SULFATE AND AMPHETAMINE SULFATE 2.5; 2.5; 2.5; 2.5 MG/1; MG/1; MG/1; MG/1
TABLET ORAL
Qty: 30 TABLET | Refills: 0 | Status: SHIPPED | OUTPATIENT
Start: 2017-05-22 | End: 2017-05-04

## 2017-05-04 RX ORDER — DEXTROAMPHETAMINE SACCHARATE, AMPHETAMINE ASPARTATE MONOHYDRATE, DEXTROAMPHETAMINE SULFATE AND AMPHETAMINE SULFATE 5; 5; 5; 5 MG/1; MG/1; MG/1; MG/1
20 CAPSULE, EXTENDED RELEASE ORAL DAILY
Qty: 30 CAPSULE | Refills: 0 | Status: SHIPPED | OUTPATIENT
Start: 2017-05-22 | End: 2017-05-04

## 2017-05-04 RX ORDER — DEXTROAMPHETAMINE SACCHARATE, AMPHETAMINE ASPARTATE, DEXTROAMPHETAMINE SULFATE AND AMPHETAMINE SULFATE 2.5; 2.5; 2.5; 2.5 MG/1; MG/1; MG/1; MG/1
TABLET ORAL
Qty: 30 TABLET | Refills: 0 | Status: SHIPPED | OUTPATIENT
Start: 2017-05-04 | End: 2017-07-14

## 2017-05-04 RX ORDER — DEXTROAMPHETAMINE SACCHARATE, AMPHETAMINE ASPARTATE MONOHYDRATE, DEXTROAMPHETAMINE SULFATE AND AMPHETAMINE SULFATE 5; 5; 5; 5 MG/1; MG/1; MG/1; MG/1
20 CAPSULE, EXTENDED RELEASE ORAL DAILY
Qty: 30 CAPSULE | Refills: 0 | Status: SHIPPED | OUTPATIENT
Start: 2017-05-04 | End: 2017-06-06

## 2017-05-04 RX ORDER — MONTELUKAST SODIUM 10 MG/1
10 TABLET ORAL AT BEDTIME
Qty: 90 TABLET | Refills: 1 | Status: SHIPPED | OUTPATIENT
Start: 2017-05-04 | End: 2017-11-06

## 2017-05-04 NOTE — MR AVS SNAPSHOT
After Visit Summary   5/4/2017    Orlando Holder    MRN: [id number]           Patient Information     Date Of Birth          1965        Visit Information        Provider Department      5/4/2017 9:00 AM [facility]        Today's Diagnoses     Seasonal allergic rhinitis due to other allergic trigger    -  1    Moderate persistent asthma, uncomplicated        ADD (attention deficit disorder)        Controlled substance agreement signed          Care Instructions    Take medications as directed.  Treatment  and  cares discussed   Follow up if problem or concern           Follow-ups after your visit        Who to contact     If you have questions or need follow up information about today's clinic visit or your schedule please [facility] directly at [phone].  Normal or non-critical lab and imaging results will be communicated to you by Fiber[employer] Networkshart, letter or phone within 4 business days after the clinic has received the results. If you do not hear from us within 7 days, please contact the clinic through Fiber[employer]hart or phone. If you have a critical or abnormal lab result, we will notify you by phone as soon as possible.  Submit refill requests through [employer] or call your pharmacy and they will forward the refill request to us. Please allow 3 business days for your refill to be completed.          Additional Information About Your Visit        MyChart Information     [employer] gives you secure access to your electronic health record. If you see a primary care provider, you can also send messages to your care team and make appointments. If you have questions, please call your primary care clinic.  If you do not have a primary care provider, please call [phone] and they will assist you.        Care EveryWhere ID     This is your Care EveryWhere ID. This could be used by other organizations to access your [facility]  "records  [id number]        Your Vitals Were     Pulse Temperature Height Pulse Oximetry BMI (Body Mass Index)       73 97  F (36.1  C) (Tympanic) 5' 11.5\" (1.816 m) 97% 37.96 kg/m2        Blood Pressure from Last 3 Encounters:   05/04/17 113/77   04/11/17 136/80   03/01/17 116/82    Weight from Last 3 Encounters:   05/04/17 276 lb (125.2 kg)   04/11/17 277 lb (125.6 kg)   03/01/17 276 lb 2 oz (125.2 kg)              Today, you had the following     No orders found for display         Today's Medication Changes          These changes are accurate as of: 5/4/17  9:58 AM.  If you have any questions, ask your nurse or doctor.               These medicines have changed or have updated prescriptions.        Dose/Directions    fluticasone-salmeterol 500-50 MCG/DOSE diskus inhaler   Commonly known as:  ADVAIR DISKUS   This may have changed:  See the new instructions.   Used for:  Moderate persistent asthma, uncomplicated   Changed by:  Kay Ramirez MD        Dose:  1 puff   Inhale 1 puff into the lungs 2 times daily   Quantity:  3 Inhaler   Refills:  1       montelukast 10 MG tablet   Commonly known as:  SINGULAIR   This may have changed:  See the new instructions.   Used for:  Moderate persistent asthma, uncomplicated, Seasonal allergic rhinitis due to other allergic trigger   Changed by:  Kay Ramirez MD        Dose:  10 mg   Take 1 tablet (10 mg) by mouth At Bedtime   Quantity:  90 tablet   Refills:  1            Where to get your medicines      These medications were sent to [facility]/pharmacy #[id number] [place], [place] - [address] [facility]  [address] [facility] [address]     Phone:  [phone]     albuterol 108 (90 BASE) MCG/ACT Inhaler         These medications were sent to [facility] - [address], [facility] [address]     Phone:  [phone]     fluticasone 50 MCG/ACT spray    fluticasone-salmeterol 500-50 MCG/DOSE diskus " inhaler    montelukast 10 MG tablet         Some of these will need a paper prescription and others can be bought over the counter.  Ask your nurse if you have questions.     Bring a paper prescription for each of these medications     amphetamine-dextroamphetamine 10 MG per tablet    amphetamine-dextroamphetamine 20 MG per 24 hr capsule                Primary Care Provider Office Phone # Fax #    Kay Ramirez [phone]       [facility] [address] [place] [address] [address]  [place] [address]        Thank you!     Thank you for choosing [facility]  for your care. Our goal is always to provide you with excellent care. Hearing back from our patients is one way we can continue to improve our services. Please take a few minutes to complete the written survey that you may receive in the mail after your visit with us. Thank you!             Your Updated Medication List - Protect others around you: Learn how to safely use, store and throw away your medicines at www.disposemymeds.org.          This list is accurate as of: 5/4/17  9:58 AM.  Always use your most recent med list.                   Brand Name Dispense Instructions for use    albuterol 108 (90 BASE) MCG/ACT Inhaler    albuterol    1 Inhaler    Inhale 1-2 puffs into the lungs 4 times daily as needed       * amphetamine-dextroamphetamine 20 MG per 24 hr capsule   Start taking on:  5/22/2017    ADDERALL XR    30 capsule    Take 1 capsule (20 mg) by mouth daily       * amphetamine-dextroamphetamine 10 MG per tablet   Start taking on:  5/22/2017    ADDERALL    30 tablet    Take one tablet at 1 pm       BIFIDOBACTERIUM BIFIDUM          fish oil-omega-3 fatty acids 1000 MG capsule      Take 2 g by mouth daily.       fluticasone 50 MCG/ACT spray    FLONASE    3 Bottle    Spray 1-2 sprays into both nostrils daily       fluticasone-salmeterol 500-50 MCG/DOSE diskus inhaler    ADVAIR DISKUS    3 Inhaler    Inhale 1 puff into the  lungs 2 times daily       GLUCOSAMINE 1500 COMPLEX PO      Take 1 tablet by mouth daily.       montelukast 10 MG tablet    SINGULAIR    90 tablet    Take 1 tablet (10 mg) by mouth At Bedtime       Multi-vitamin Tabs tablet   Generic drug:  multivitamin, therapeutic with minerals      1 tab q  day       sildenafil 100 MG cap/tab    VIAGRA    10 tablet    Take 1 tablet by mouth daily as needed for erectile dysfunction.       VITAMIN D PO      Take  by mouth. 5000 units       WAL-ITIN 10 MG tablet   Generic drug:  loratadine     30    1 TABLET DAILY       * Notice:  This list has 2 medication(s) that are the same as other medications prescribed for you. Read the directions carefully, and ask your doctor or other care provider to review them with you.

## 2017-05-04 NOTE — NURSING NOTE
"Chief Complaint   Patient presents with     Recheck Medication       Initial /77  Pulse 73  Temp 97  F (36.1  C) (Tympanic)  Ht 5' 11.5\" (1.816 m)  Wt 276 lb (125.2 kg)  SpO2 97%  BMI 37.96 kg/m2 Estimated body mass index is 37.96 kg/(m^2) as calculated from the following:    Height as of this encounter: 5' 11.5\" (1.816 m).    Weight as of this encounter: 276 lb (125.2 kg).  Medication Reconciliation: complete  "

## 2017-05-04 NOTE — PROGRESS NOTES
SUBJECTIVE:                                                    Orlando Holder is a 52 year old male who presents to clinic today for the following health issues:      Medication Followup of Adderall     Taking Medication as prescribed: yes    Side Effects:  None    Medication Helping Symptoms:  yes   He was given 3 scripts and he should have been good for 3 months, so last script could  have been filled  5/22.  he states he had dropped it at [facility] pharmacy earlier , although when he went back to get refill they said they cannot find his script. He picked up his last script late  on 4/04, bc he was  due on 03/22/ 2017. He is running out of med's now, so needs a new script.   He also needs all his other med's sent to [facility] now for 3 months supply     Asthma Follow-Up    Was ACT completed today?    Yes  . He had flare up a month ago, but he is feeling much better. He thinsk he wheezes sometimes at night, per wife although he thinsk it is probably noisy at night with his C- pap. He usually sleeps ok and does not wake up or cough at night. His allergies are also typically worse wit change of season but he is doing ok on current med's.he occasionally uses albuterol before strenuous activity sometimes bc it can trigger some asthma sx sometimes   ACT Total Scores 5/4/2017   ACT TOTAL SCORE (Goal Greater than or Equal to 20) 23   In the past 12 months, how many times did you visit the emergency room for your asthma without being admitted to the hospital? 0   In the past 12 months, how many times were you hospitalized overnight because of your asthma? 0       Recent asthma triggers that patient is dealing with: None        Problem list and histories reviewed & adjusted, as indicated.  Additional history: as documented    Patient Active Problem List   Diagnosis     Nonspecific abnormal results of liver function study     Difficulty concentrating     Nevus     CARDIOVASCULAR SCREENING; LDL GOAL LESS THAN 160      Erectile dysfunction     Common wart     Achilles tendonitis     Anal fissure     Obesity     History of colonic polyps     ADD (attention deficit disorder)     Controlled substance agreement signed     Seasonal allergic rhinitis, unspecified allergic rhinitis trigger     Moderate persistent asthma without complication     BMI 38.0-38.9,adult     Obesity, unspecified obesity severity, unspecified obesity type     Abnormal thyroid blood test     Elevated fasting glucose     Past Surgical History:   Procedure Laterality Date     C NONSPECIFIC PROCEDURE  2004    repair deviated septum     HC REPAIR INCISIONAL HERNIA,REDUCIBLE  1997    Hernia Repair,  inguinal, Unilateral x2 on rt.       Social History   Substance Use Topics     Smoking status: Former Smoker     Packs/day: 0.50     Years: 20.00     Types: Cigarettes     Quit date: 3/26/2001     Smokeless tobacco: Never Used     Alcohol use 0.0 oz/week     0 Standard drinks or equivalent per week     Family History   Problem Relation Age of Onset     Hypertension Paternal Grandmother      C.A.D. Paternal Grandmother      Arthritis Paternal Grandmother      DIABETES No family hx of      Prostate Cancer No family hx of      Cancer - colorectal No family hx of            Reviewed and updated as needed this visit by clinical staff  Tobacco  Allergies  Meds       Reviewed and updated as needed this visit by Provider         ROS:  C: NEGATIVE for fever, chills, change in weight  E/M: NEGATIVE for ear, mouth and throat problems  R: NEGATIVE for significant cough or SOB  CV: NEGATIVE for chest pain, palpitations or peripheral edema  GI: NEGATIVE for nausea, abdominal pain, heartburn, or change in bowel habits  : NEGATIVE for frequency, dysuria, or hematuria  N: NEGATIVE for weakness, dizziness or paresthesias  E: NEGATIVE for temperature intolerance, skin/hair changes    OBJECTIVE:                                                    /77  Pulse 73  Temp 97  F (36.1  " C) (Tympanic)  Ht 5' 11.5\" (1.816 m)  Wt 276 lb (125.2 kg)  SpO2 97%  BMI 37.96 kg/m2  Body mass index is 37.96 kg/(m^2).  GENERAL: healthy, alert and no distress  NECK: no adenopathy  RESP: lungs clear to auscultation - no rales, rhonchi or wheezes  CV: regular rate and rhythm, normal S1 S2, no S3 or S4,  ABDOMEN: soft, nontender, no hepatosplenomegaly, no masses and bowel sounds normal  MS: no gross musculoskeletal defects noted, no edema  SKIN: no suspicious lesions or rashes  NEURO: Normal strength and tone, mentation intact and speech normal  PSYCH: mentation appears normal, affect normal/bright         ASSESSMENT/PLAN:                                                            (F98.8) ADD (attention deficit disorder)  Comment:   Plan: amphetamine-dextroamphetamine (ADDERALL XR) 20         MG per 24 hr capsule,         amphetamine-dextroamphetamine (ADDERALL) 10 MG         per tablet, DISCONTINUED:         amphetamine-dextroamphetamine (ADDERALL XR) 20         MG per 24 hr capsule, DISCONTINUED:         amphetamine-dextroamphetamine (ADDERALL) 10 MG         per tablet            (Z79.899) Controlled substance agreement signed  Comment:   Plan: amphetamine-dextroamphetamine (ADDERALL XR) 20         MG per 24 hr capsule,         amphetamine-dextroamphetamine (ADDERALL) 10 MG         per tablet, DISCONTINUED:         amphetamine-dextroamphetamine (ADDERALL XR) 20         MG per 24 hr capsule, DISCONTINUED:         amphetamine-dextroamphetamine (ADDERALL) 10 MG         per tablet    apparently [facility] pharmacy has lost his script for ( April and may) , so I gave him one script for this month for now. He plans to refill at our [facility]  pharmacy here today. Guidelines discussed and he understands well the limitations and restrictions on these scripts.            (J30.89) Seasonal allergic rhinitis due to other allergic trigger  (primary encounter diagnosis)  Comment: stable   Plan: montelukast (SINGULAIR) 10 MG " tablet,         fluticasone (FLONASE) 50 MCG/ACT spray            (J45.40) Moderate persistent asthma, uncomplicated  Comment: improved and stable  Plan: montelukast (SINGULAIR) 10 MG tablet,         fluticasone-salmeterol (ADVAIR DISKUS) 500-50         MCG/DOSE Diskus inhaler, albuterol (ALBUTEROL)         108 (90 BASE) MCG/ACT Inhaler                Patient expressed understanding and agreement with treatment plan. All patient's questions were answered, will let me know if has more later.  Medications: Rx's: Reviewed the potential side effects/complications of medications prescribed.       Kay Ramirez MD  [facility] [place]

## 2017-05-04 NOTE — PATIENT INSTRUCTIONS
Take medications as directed.  Treatment  and  cares discussed   Follow up if problem or concern

## 2017-05-05 ASSESSMENT — ASTHMA QUESTIONNAIRES: ACT_TOTALSCORE: 23

## 2017-06-06 DIAGNOSIS — Z79.899 CONTROLLED SUBSTANCE AGREEMENT SIGNED: ICD-10-CM

## 2017-06-06 DIAGNOSIS — F98.8 ADD (ATTENTION DEFICIT DISORDER): ICD-10-CM

## 2017-06-06 RX ORDER — DEXTROAMPHETAMINE SACCHARATE, AMPHETAMINE ASPARTATE MONOHYDRATE, DEXTROAMPHETAMINE SULFATE AND AMPHETAMINE SULFATE 5; 5; 5; 5 MG/1; MG/1; MG/1; MG/1
20 CAPSULE, EXTENDED RELEASE ORAL DAILY
Qty: 30 CAPSULE | Refills: 0 | Status: SHIPPED | OUTPATIENT
Start: 2017-06-06 | End: 2017-07-14

## 2017-06-06 NOTE — TELEPHONE ENCOUNTER
Reason for Call:  Medication or medication refill:    Do you use a [facility]?  Name of the pharmacy and phone number for the current request:  [facility] - [phone]    Name of the medication requested: ADDERALL 20 MG 24 HR CAPSULES    Other request: NA    Can we leave a detailed message on this number? YES    Phone number patient can be reached at: Cell number on file:    Telephone Information:   Mobile [phone]       Best Time: ANY    Call taken on 6/6/2017 at 1:50 PM by Trisha Quigley

## 2017-06-06 NOTE — TELEPHONE ENCOUNTER
Controlled Substance Refill Request for amphetamine-dextroamphetamine (ADDERALL XR) 20 MG per 24 hr capsule  Problem List Complete:  No     PROVIDER TO CONSIDER COMPLETION OF PROBLEM LIST AND OVERVIEW/CONTROLLED SUBSTANCE AGREEMENT    Last Written Prescription Date:  5/4/17  Last Fill Quantity: 30,   # refills: 0    Last Office Visit with [facility] [place] care provider: 5/4/17    Future Office visit:     Controlled substance agreement on file: Yes:  Date 12/5/16.     Processing:  Staff will hand deliver Rx to on-site pharmacy   checked in past 6 months?  No, route to [facility]

## 2017-06-06 NOTE — TELEPHONE ENCOUNTER
[place] ([place] reviewed:  reviewed- no concerns    [place]:  [email or web address]    Flor Moeller RN  [facility]  [phone]

## 2017-07-14 ENCOUNTER — OFFICE VISIT (OUTPATIENT)
Dept: FAMILY MEDICINE | Facility: CLINIC | Age: 52
End: 2017-07-14
Payer: COMMERCIAL

## 2017-07-14 VITALS
WEIGHT: 277 LBS | DIASTOLIC BLOOD PRESSURE: 76 MMHG | HEART RATE: 54 BPM | SYSTOLIC BLOOD PRESSURE: 121 MMHG | TEMPERATURE: 98 F | BODY MASS INDEX: 38.1 KG/M2 | OXYGEN SATURATION: 97 %

## 2017-07-14 DIAGNOSIS — F98.8 ATTENTION DEFICIT DISORDER, UNSPECIFIED HYPERACTIVITY PRESENCE: ICD-10-CM

## 2017-07-14 DIAGNOSIS — J45.40 MODERATE PERSISTENT ASTHMA, UNCOMPLICATED: ICD-10-CM

## 2017-07-14 DIAGNOSIS — Z79.899 CONTROLLED SUBSTANCE AGREEMENT SIGNED: Primary | ICD-10-CM

## 2017-07-14 PROCEDURE — 99214 OFFICE O/P EST MOD 30 MIN: CPT | Performed by: FAMILY MEDICINE

## 2017-07-14 RX ORDER — DEXTROAMPHETAMINE SACCHARATE, AMPHETAMINE ASPARTATE MONOHYDRATE, DEXTROAMPHETAMINE SULFATE AND AMPHETAMINE SULFATE 5; 5; 5; 5 MG/1; MG/1; MG/1; MG/1
20 CAPSULE, EXTENDED RELEASE ORAL DAILY
Qty: 30 CAPSULE | Refills: 0 | Status: SHIPPED | OUTPATIENT
Start: 2017-07-14 | End: 2017-09-07

## 2017-07-14 RX ORDER — DEXTROAMPHETAMINE SACCHARATE, AMPHETAMINE ASPARTATE, DEXTROAMPHETAMINE SULFATE AND AMPHETAMINE SULFATE 2.5; 2.5; 2.5; 2.5 MG/1; MG/1; MG/1; MG/1
TABLET ORAL
Qty: 30 TABLET | Refills: 0 | Status: SHIPPED | OUTPATIENT
Start: 2017-07-14 | End: 2017-10-19

## 2017-07-14 RX ORDER — PREDNISONE 20 MG/1
40 TABLET ORAL DAILY
Qty: 10 TABLET | Refills: 0 | Status: SHIPPED | OUTPATIENT
Start: 2017-07-14 | End: 2017-07-19

## 2017-07-14 RX ORDER — ALBUTEROL SULFATE 90 UG/1
1-2 AEROSOL, METERED RESPIRATORY (INHALATION) 4 TIMES DAILY PRN
Qty: 1 INHALER | Refills: 1 | Status: SHIPPED | OUTPATIENT
Start: 2017-07-14 | End: 2018-12-18

## 2017-07-14 NOTE — MR AVS SNAPSHOT
After Visit Summary   7/14/2017    Orlando Holder    MRN: [id number]           Patient Information     Date Of Birth          1965        Visit Information        Provider Department      7/14/2017 1:40 PM Charlie Kingsley MD [facility] [place]        Today's Diagnoses     Controlled substance agreement signed    -  1    Moderate persistent asthma, uncomplicated        Attention deficit disorder, unspecified hyperactivity presence           Follow-ups after your visit        Who to contact     If you have questions or need follow up information about today's clinic visit or your schedule please contact [facility] [place] directly at [phone].  Normal or non-critical lab and imaging results will be communicated to you by ExtraOrthohart, letter or phone within 4 business days after the clinic has received the results. If you do not hear from us within 7 days, please contact the clinic through ExtraOrthohart or phone. If you have a critical or abnormal lab result, we will notify you by phone as soon as possible.  Submit refill requests through REBIScan or call your pharmacy and they will forward the refill request to us. Please allow 3 business days for your refill to be completed.          Additional Information About Your Visit        MyChart Information     REBIScan gives you secure access to your electronic health record. If you see a primary care provider, you can also send messages to your care team and make appointments. If you have questions, please call your primary care clinic.  If you do not have a primary care provider, please call [phone] and they will assist you.        Care EveryWhere ID     This is your Care EveryWhere ID. This could be used by other organizations to access your [place] medical records  [id number]        Your Vitals Were     Pulse Temperature Pulse Oximetry BMI (Body Mass Index)          54 98  F (36.7  C) (Oral) 97% 38.1 kg/m2         Blood  Pressure from Last 3 Encounters:   07/14/17 121/76   05/04/17 113/77   04/11/17 136/80    Weight from Last 3 Encounters:   07/14/17 277 lb (125.6 kg)   05/04/17 276 lb (125.2 kg)   04/11/17 277 lb (125.6 kg)              Today, you had the following     No orders found for display         Today's Medication Changes          These changes are accurate as of: 7/14/17  2:17 PM.  If you have any questions, ask your nurse or doctor.               Start taking these medicines.        Dose/Directions    predniSONE 20 MG tablet   Commonly known as:  DELTASONE   Used for:  Moderate persistent asthma, uncomplicated   Started by:  Charlie Kingsley MD        Dose:  40 mg   Take 2 tablets (40 mg) by mouth daily for 5 days   Quantity:  10 tablet   Refills:  0            Where to get your medicines      These medications were sent to [facility] [place] [address], [place] [address]     Phone:  [phone]     albuterol 108 (90 BASE) MCG/ACT Inhaler    predniSONE 20 MG tablet         Some of these will need a paper prescription and others can be bought over the counter.  Ask your nurse if you have questions.     Bring a paper prescription for each of these medications     amphetamine-dextroamphetamine 10 MG per tablet    amphetamine-dextroamphetamine 20 MG per 24 hr capsule                Primary Care Provider Office Phone # Fax #    Kay Ramirez [phone]       [facility] [address] [place] [address] [address]  [place] [address]        Equal Access to Services     [facility]: Hadii aad ku hadasho Soomaali, waaxda luqadaha, qaybta kaalmada adeegyavenkatesh, bea gonsalves. So [facility] [phone].    ATENCIÓN: Si habla español, tiene a rudolph disposición servicios gratuitos de asistencia lingüística. Llame al [phone].    We comply with applicable federal civil rights laws and [place] laws. We do not discriminate  on the basis of race, color, national origin, age, disability sex, sexual orientation or gender identity.            Thank you!     Thank you for choosing [facility] [place]  for your care. Our goal is always to provide you with excellent care. Hearing back from our patients is one way we can continue to improve our services. Please take a few minutes to complete the written survey that you may receive in the mail after your visit with us. Thank you!             Your Updated Medication List - Protect others around you: Learn how to safely use, store and throw away your medicines at www.disposemymeds.org.          This list is accurate as of: 7/14/17  2:17 PM.  Always use your most recent med list.                   Brand Name Dispense Instructions for use Diagnosis    albuterol 108 (90 BASE) MCG/ACT Inhaler    albuterol    1 Inhaler    Inhale 1-2 puffs into the lungs 4 times daily as needed    Moderate persistent asthma, uncomplicated       * amphetamine-dextroamphetamine 20 MG per 24 hr capsule    ADDERALL XR    30 capsule    Take 1 capsule (20 mg) by mouth daily    Controlled substance agreement signed, Attention deficit disorder, unspecified hyperactivity presence       * amphetamine-dextroamphetamine 10 MG per tablet    ADDERALL    30 tablet    Take one tablet at 1 pm    Controlled substance agreement signed, Attention deficit disorder, unspecified hyperactivity presence       BIFIDOBACTERIUM BIFIDUM           fish oil-omega-3 fatty acids 1000 MG capsule      Take 2 g by mouth daily.        fluticasone 50 MCG/ACT spray    FLONASE    3 Bottle    Spray 1-2 sprays into both nostrils daily    Seasonal allergic rhinitis due to other allergic trigger       fluticasone-salmeterol 500-50 MCG/DOSE diskus inhaler    ADVAIR DISKUS    3 Inhaler    Inhale 1 puff into the lungs 2 times daily    Moderate persistent asthma, uncomplicated       GLUCOSAMINE 1500 COMPLEX PO      Take 1 tablet by mouth daily.         montelukast 10 MG tablet    SINGULAIR    90 tablet    Take 1 tablet (10 mg) by mouth At Bedtime    Moderate persistent asthma, uncomplicated, Seasonal allergic rhinitis due to other allergic trigger       Multi-vitamin Tabs tablet   Generic drug:  multivitamin, therapeutic with minerals      1 tab q  day        predniSONE 20 MG tablet    DELTASONE    10 tablet    Take 2 tablets (40 mg) by mouth daily for 5 days    Moderate persistent asthma, uncomplicated       sildenafil 100 MG cap/tab    VIAGRA    10 tablet    Take 1 tablet by mouth daily as needed for erectile dysfunction.    Erectile dysfunction       VITAMIN D PO      Take  by mouth. 5000 units        WAL-ITIN 10 MG tablet   Generic drug:  loratadine     30    1 TABLET DAILY        * Notice:  This list has 2 medication(s) that are the same as other medications prescribed for you. Read the directions carefully, and ask your doctor or other care provider to review them with you.

## 2017-07-14 NOTE — PROGRESS NOTES
SUBJECTIVE:                                                    Orlando Holder is a 52 year old male who presents to clinic today for the following health issues:      Asthma Follow-Up  Feeling slight worse , on and off wheezing.  No fever, chills.  Would like refill on his mediation of add. No side effects.  Was ACT completed today?  19   Yes    ACT Total Scores 5/4/2017   ACT TOTAL SCORE (Goal Greater than or Equal to 20) 23   In the past 12 months, how many times did you visit the emergency room for your asthma without being admitted to the hospital? 0   In the past 12 months, how many times were you hospitalized overnight because of your asthma? 0       Recent asthma triggers that patient is dealing with: seasonal allgeries         Amount of exercise or physical activity: 4-5 days/week for an average of 30-45 minutes    Problems taking medications regularly: No    Medication side effects: none    Diet: regular (no restrictions)          Problem list and histories reviewed & adjusted, as indicated.  Additional history: as documented    Patient Active Problem List   Diagnosis     Nonspecific abnormal results of liver function study     Difficulty concentrating     Nevus     CARDIOVASCULAR SCREENING; LDL GOAL LESS THAN 160     Erectile dysfunction     Common wart     Achilles tendonitis     Anal fissure     Obesity     History of colonic polyps     ADD (attention deficit disorder)     Controlled substance agreement signed     Seasonal allergic rhinitis, unspecified allergic rhinitis trigger     Moderate persistent asthma without complication     BMI 38.0-38.9,adult     Obesity, unspecified obesity severity, unspecified obesity type     Abnormal thyroid blood test     Elevated fasting glucose     Past Surgical History:   Procedure Laterality Date     C NONSPECIFIC PROCEDURE  2004    repair deviated septum     HC REPAIR INCISIONAL HERNIA,REDUCIBLE  1997    Hernia Repair,  inguinal, Unilateral x2 on rt.        Social History   Substance Use Topics     Smoking status: Former Smoker     Packs/day: 0.50     Years: 20.00     Types: Cigarettes     Quit date: 3/26/2001     Smokeless tobacco: Never Used     Alcohol use 0.0 oz/week     0 Standard drinks or equivalent per week     Family History   Problem Relation Age of Onset     Hypertension Paternal Grandmother      C.A.D. Paternal Grandmother      Arthritis Paternal Grandmother      DIABETES No family hx of      Prostate Cancer No family hx of      Cancer - colorectal No family hx of          Current Outpatient Prescriptions   Medication Sig Dispense Refill     amphetamine-dextroamphetamine (ADDERALL XR) 20 MG per 24 hr capsule Take 1 capsule (20 mg) by mouth daily 30 capsule 0     amphetamine-dextroamphetamine (ADDERALL) 10 MG per tablet Take one tablet at 1 pm 30 tablet 0     predniSONE (DELTASONE) 20 MG tablet Take 2 tablets (40 mg) by mouth daily for 5 days 10 tablet 0     albuterol (ALBUTEROL) 108 (90 BASE) MCG/ACT Inhaler Inhale 1-2 puffs into the lungs 4 times daily as needed 1 Inhaler 1     montelukast (SINGULAIR) 10 MG tablet Take 1 tablet (10 mg) by mouth At Bedtime 90 tablet 1     fluticasone-salmeterol (ADVAIR DISKUS) 500-50 MCG/DOSE diskus inhaler Inhale 1 puff into the lungs 2 times daily 3 Inhaler 1     fluticasone (FLONASE) 50 MCG/ACT spray Spray 1-2 sprays into both nostrils daily 3 Bottle 3     WAL-ITIN 10 MG OR TABS 1 TABLET DAILY 30 0     MULTI-VITAMIN OR TABS 1 tab q  day  0     [DISCONTINUED] amphetamine-dextroamphetamine (ADDERALL XR) 20 MG per 24 hr capsule Take 1 capsule (20 mg) by mouth daily 30 capsule 0     [DISCONTINUED] albuterol (ALBUTEROL) 108 (90 BASE) MCG/ACT Inhaler Inhale 1-2 puffs into the lungs 4 times daily as needed 1 Inhaler 1     [DISCONTINUED] amphetamine-dextroamphetamine (ADDERALL) 10 MG per tablet Take one tablet at 1 pm 30 tablet 0     fish oil-omega-3 fatty acids (FISH OIL) 1000 MG capsule Take 2 g by mouth daily.        Glucosamine-Chondroit-Vit C-Mn (GLUCOSAMINE 1500 COMPLEX PO) Take 1 tablet by mouth daily.       Cholecalciferol (VITAMIN D PO) Take  by mouth. 5000 units       BIFIDOBACTERIUM BIFIDUM        sildenafil (VIAGRA) 100 MG tablet Take 1 tablet by mouth daily as needed for erectile dysfunction. (Patient not taking: Reported on 7/14/2017) 10 tablet 12       Reviewed and updated as needed this visit by clinical staff       Reviewed and updated as needed this visit by Provider         ROS:  C: NEGATIVE for fever, chills, change in weight  E/M: NEGATIVE for ear, mouth and throat problems  R: NEGATIVE for significant cough or SOB  CV: NEGATIVE for chest pain, palpitations or peripheral edema    OBJECTIVE:                                                    /76 (BP Location: Right arm, Cuff Size: Adult Large)  Pulse 54  Temp 98  F (36.7  C) (Oral)  Wt 277 lb (125.6 kg)  SpO2 97%  BMI 38.1 kg/m2  Body mass index is 38.1 kg/(m^2).   GENERAL: healthy, alert, well nourished, well hydrated, no distress  HENT: ear canals- normal; TMs- normal; Nose- normal; Mouth- no ulcers, no lesions  NECK: no tenderness, no adenopathy, no asymmetry, no masses, no stiffness; thyroid- normal to palpation  RESP: lungs clear to auscultation - no rwheezing  CV: regular rates and rhythm, normal S1 S2, no S3 or S4 and no murmur, no click or rub -         ASSESSMENT/PLAN:                                                        ICD-10-CM    1. Controlled substance agreement signed Z79.899 amphetamine-dextroamphetamine (ADDERALL XR) 20 MG per 24 hr capsule     amphetamine-dextroamphetamine (ADDERALL) 10 MG per tablet   2. Moderate persistent asthma, uncomplicated J45.40 predniSONE (DELTASONE) 20 MG tablet     albuterol (ALBUTEROL) 108 (90 BASE) MCG/ACT Inhaler   3. Attention deficit disorder, unspecified hyperactivity presence F98.8 amphetamine-dextroamphetamine (ADDERALL XR) 20 MG per 24 hr capsule     amphetamine-dextroamphetamine (ADDERALL) 10  MG per tablet       Patient ADD medication refilled.  Asthma slight worse, will add prednisone to the regime.  Follow up if any new or change in symptoms.    Charlie Kingsley MD  [facility]

## 2017-07-15 ASSESSMENT — ASTHMA QUESTIONNAIRES: ACT_TOTALSCORE: 19

## 2017-08-15 ENCOUNTER — OFFICE VISIT (OUTPATIENT)
Dept: FAMILY MEDICINE | Facility: CLINIC | Age: 52
End: 2017-08-15
Payer: COMMERCIAL

## 2017-08-15 VITALS
SYSTOLIC BLOOD PRESSURE: 133 MMHG | TEMPERATURE: 97.8 F | BODY MASS INDEX: 37.65 KG/M2 | WEIGHT: 278 LBS | HEART RATE: 69 BPM | OXYGEN SATURATION: 96 % | DIASTOLIC BLOOD PRESSURE: 87 MMHG | HEIGHT: 72 IN

## 2017-08-15 DIAGNOSIS — M25.472 SWOLLEN L ANKLE: ICD-10-CM

## 2017-08-15 DIAGNOSIS — L03.116 CELLULITIS OF LEFT LOWER EXTREMITY: Primary | ICD-10-CM

## 2017-08-15 PROCEDURE — 99213 OFFICE O/P EST LOW 20 MIN: CPT | Performed by: FAMILY MEDICINE

## 2017-08-15 RX ORDER — CEPHALEXIN 500 MG/1
500 CAPSULE ORAL 3 TIMES DAILY
Qty: 30 CAPSULE | Refills: 0 | Status: SHIPPED | OUTPATIENT
Start: 2017-08-15 | End: 2017-11-13

## 2017-08-15 NOTE — MR AVS SNAPSHOT
After Visit Summary   8/15/2017    Orlando Holder    MRN: [id number]           Patient Information     Date Of Birth          1965        Visit Information        Provider Department      8/15/2017 6:40 PM Kay Ramirez MD [facility] [place]        Today's Diagnoses     Cellulitis of left lower extremity    -  1    Swollen R ankle          Care Instructions    Take medications as directed.  treatment  and symptomatic cares discussed   Follow up if problem or concern             Follow-ups after your visit        Who to contact     If you have questions or need follow up information about today's clinic visit or your schedule please contact [facility] directly at [phone].  Normal or non-critical lab and imaging results will be communicated to you by MyChart, letter or phone within 4 business days after the clinic has received the results. If you do not hear from us within 7 days, please contact the clinic through [employer] or phone. If you have a critical or abnormal lab result, we will notify you by phone as soon as possible.  Submit refill requests through [employer] or call your pharmacy and they will forward the refill request to us. Please allow 3 business days for your refill to be completed.          Additional Information About Your Visit        MyChart Information     [employer] gives you secure access to your electronic health record. If you see a primary care provider, you can also send messages to your care team and make appointments. If you have questions, please call your primary care clinic.  If you do not have a primary care provider, please call [phone] and they will assist you.        Care EveryWhere ID     This is your Care EveryWhere ID. This could be used by other organizations to access your [place] medical records  [id number]        Your Vitals Were     Pulse Temperature Height Pulse Oximetry BMI (Body Mass Index)       69  "97.8  F (36.6  C) (Tympanic) 5' 11.5\" (1.816 m) 96% 38.23 kg/m2        Blood Pressure from Last 3 Encounters:   08/15/17 133/87   07/14/17 121/76   05/04/17 113/77    Weight from Last 3 Encounters:   08/15/17 278 lb (126.1 kg)   07/14/17 277 lb (125.6 kg)   05/04/17 276 lb (125.2 kg)              Today, you had the following     No orders found for display         Today's Medication Changes          These changes are accurate as of: 8/15/17  7:10 PM.  If you have any questions, ask your nurse or doctor.               Start taking these medicines.        Dose/Directions    cephALEXin 500 MG capsule   Commonly known as:  KEFLEX   Used for:  Cellulitis of left lower extremity   Started by:  Kay Ramirez MD        Dose:  500 mg   Take 1 capsule (500 mg) by mouth 3 times daily   Quantity:  30 capsule   Refills:  0            Where to get your medicines      These medications were sent to [facility]/pharmacy #[id number] - [place], [place] - [address] [place] [address]     Phone:  [phone]     cephALEXin 500 MG capsule                Primary Care Provider Office Phone # Fax #    Kay Ramirez [phone]       [address]  [place] [address]        Equal Access to Services     [facility] AH: Hadii giles dumont hadasho Soboniali, waaxda luqadaha, qaybta kaalmada adeegyada, bea gonsalves. So [facility] [phone].    ATENCIÓN: Si habla español, tiene a rudolph disposición servicios gratuitos de asistencia lingüística. Llame al [phone].    We comply with applicable federal civil rights laws and [place] laws. We do not discriminate on the basis of race, color, national origin, age, disability sex, sexual orientation or gender identity.            Thank you!     Thank you for choosing [facility] [place]  for your care. Our goal is always to provide you with excellent care. Hearing back from our patients is one way we can " continue to improve our services. Please take a few minutes to complete the written survey that you may receive in the mail after your visit with us. Thank you!             Your Updated Medication List - Protect others around you: Learn how to safely use, store and throw away your medicines at www.disposemymeds.org.          This list is accurate as of: 8/15/17  7:10 PM.  Always use your most recent med list.                   Brand Name Dispense Instructions for use Diagnosis    albuterol 108 (90 BASE) MCG/ACT Inhaler    albuterol    1 Inhaler    Inhale 1-2 puffs into the lungs 4 times daily as needed    Moderate persistent asthma, uncomplicated       * amphetamine-dextroamphetamine 20 MG per 24 hr capsule    ADDERALL XR    30 capsule    Take 1 capsule (20 mg) by mouth daily    Controlled substance agreement signed, Attention deficit disorder, unspecified hyperactivity presence       * amphetamine-dextroamphetamine 10 MG per tablet    ADDERALL    30 tablet    Take one tablet at 1 pm    Controlled substance agreement signed, Attention deficit disorder, unspecified hyperactivity presence       BIFIDOBACTERIUM BIFIDUM           cephALEXin 500 MG capsule    KEFLEX    30 capsule    Take 1 capsule (500 mg) by mouth 3 times daily    Cellulitis of left lower extremity       fish oil-omega-3 fatty acids 1000 MG capsule      Take 2 g by mouth daily.        fluticasone 50 MCG/ACT spray    FLONASE    3 Bottle    Spray 1-2 sprays into both nostrils daily    Seasonal allergic rhinitis due to other allergic trigger       fluticasone-salmeterol 500-50 MCG/DOSE diskus inhaler    ADVAIR DISKUS    3 Inhaler    Inhale 1 puff into the lungs 2 times daily    Moderate persistent asthma, uncomplicated       GLUCOSAMINE 1500 COMPLEX PO      Take 1 tablet by mouth daily.        montelukast 10 MG tablet    SINGULAIR    90 tablet    Take 1 tablet (10 mg) by mouth At Bedtime    Moderate persistent asthma, uncomplicated, Seasonal allergic  rhinitis due to other allergic trigger       Multi-vitamin Tabs tablet   Generic drug:  multivitamin, therapeutic with minerals      1 tab q  day        sildenafil 100 MG cap/tab    VIAGRA    10 tablet    Take 1 tablet by mouth daily as needed for erectile dysfunction.    Erectile dysfunction       VITAMIN D PO      Take  by mouth. 5000 units        WAL-ITIN 10 MG tablet   Generic drug:  loratadine     30    1 TABLET DAILY        * Notice:  This list has 2 medication(s) that are the same as other medications prescribed for you. Read the directions carefully, and ask your doctor or other care provider to review them with you.

## 2017-08-15 NOTE — NURSING NOTE
"Chief Complaint   Patient presents with     Joint Swelling       Initial /87  Pulse 69  Temp 97.8  F (36.6  C) (Tympanic)  Ht 5' 11.5\" (1.816 m)  Wt 278 lb (126.1 kg)  SpO2 96%  BMI 38.23 kg/m2 Estimated body mass index is 38.23 kg/(m^2) as calculated from the following:    Height as of this encounter: 5' 11.5\" (1.816 m).    Weight as of this encounter: 278 lb (126.1 kg).  Medication Reconciliation: complete  "

## 2017-08-15 NOTE — PROGRESS NOTES
SUBJECTIVE:                                                    Orlando Holder is a 52 year old male who presents to clinic today for the following health issues:      Knee Pain/AnkleSwelling     Onset: ankle on 7/31 and knee on 8/1    Description:   Location: left ankle and left knee pain , especially after long drive , although he thinks he was bit by something on top of foot and had  red spot on top of the foot and slight redness that spread around it, and it is slightly better but still looks red . His ankle and knee was also then hurting and just felt stiff although pain is not bad now . No fever or chills etc   Character: Dull ache. He sometimes has tendency or swollen ankle after prolong sitting and driving although usually he does not have pain, so just wondering if it has anything to do with bug bite. Also has  another trip coming back in few days so just concerned     Intensity: mild- moderate    Progression of Symptoms: constant aching ,     Accompanying Signs & Symptoms:  Other symptoms: swelling in the left ankle  and stiffness on the left knee . No fever or chills and feels  well otherwise , no chest pain sob etc     History:   Previous similar pain: YES- usually has swelling in the ankles while traveling but this time its more consistent       Precipitating factors:   Trauma or overuse: no     Alleviating factors:  Improved by: nothing    Therapies Tried and outcome: none             Problem list and histories reviewed & adjusted, as indicated.  Additional history: as documented    Patient Active Problem List   Diagnosis     Nonspecific abnormal results of liver function study     Difficulty concentrating     Nevus     CARDIOVASCULAR SCREENING; LDL GOAL LESS THAN 160     Erectile dysfunction     Common wart     Achilles tendonitis     Anal fissure     Obesity     History of colonic polyps     ADD (attention deficit disorder)     Controlled substance agreement signed     Seasonal allergic  "rhinitis, unspecified allergic rhinitis trigger     Moderate persistent asthma without complication     BMI 38.0-38.9,adult     Obesity, unspecified obesity severity, unspecified obesity type     Abnormal thyroid blood test     Elevated fasting glucose     Past Surgical History:   Procedure Laterality Date     C NONSPECIFIC PROCEDURE  2004    repair deviated septum     HC REPAIR INCISIONAL HERNIA,REDUCIBLE  1997    Hernia Repair,  inguinal, Unilateral x2 on rt.       Social History   Substance Use Topics     Smoking status: Former Smoker     Packs/day: 0.50     Years: 20.00     Types: Cigarettes     Quit date: 3/26/2001     Smokeless tobacco: Never Used     Alcohol use 0.0 oz/week     0 Standard drinks or equivalent per week     Family History   Problem Relation Age of Onset     Hypertension Paternal Grandmother      C.A.D. Paternal Grandmother      Arthritis Paternal Grandmother      DIABETES No family hx of      Prostate Cancer No family hx of      Cancer - colorectal No family hx of              Reviewed and updated as needed this visit by clinical staff     Reviewed and updated as needed this visit by Provider         ROS:  Constitutional, HEENT, cardiovascular, pulmonary, GI, , musculoskeletal, neuro, skin, endocrine and psych systems are negative, except as otherwise noted.      OBJECTIVE:   /87  Pulse 69  Temp 97.8  F (36.6  C) (Tympanic)  Ht 5' 11.5\" (1.816 m)  Wt 278 lb (126.1 kg)  SpO2 96%  BMI 38.23 kg/m2  Body mass index is 38.23 kg/(m^2).  GENERAL: healthy, alert and no distress  RESP: lungs clear to auscultation -  CV: regular rate and rhythm, normal S1 S2,   MS: both legs with no significant tenderness or pain on ankles / knees, although has slight puffiness around left ankle, but  normal range of motion, dorsum of left foot has small area  of possible bug bite that looks sort of dry scabby now but has  mild erythema  around it size of quarter and faint erythematous  streak toward ankle " minimal warmth, no acute tenderness on ankle, knee or  calf tenderness, and ROM of knee and ankle does not aggravate any pain.   SKIN: no suspicious lesions or rashes        ASSESSMENT/PLAN:         (L03.116) Cellulitis of left lower extremity  (primary encounter diagnosis)  Comment: mostly dorsum of foot   Plan: cephALEXin (KEFLEX) 500 MG capsule    (M25.472) Swollen L ankle  Comment: painless , may be related to possible bug bite on left foot   Plan:    Cares and symptomatic treatment discussed follow up if problem     Patient expressed understanding and agreement with treatment plan. All patient's questions were answered, will let me know if has more later.  Medications: Rx's: Reviewed the potential side effects/complications of medications prescribed.       Kay Ramirez MD  [facility]

## 2017-08-16 NOTE — PATIENT INSTRUCTIONS
Take medications as directed.  treatment  and symptomatic cares discussed   Follow up if problem or concern

## 2017-09-07 DIAGNOSIS — Z79.899 CONTROLLED SUBSTANCE AGREEMENT SIGNED: ICD-10-CM

## 2017-09-07 DIAGNOSIS — F98.8 ATTENTION DEFICIT DISORDER, UNSPECIFIED HYPERACTIVITY PRESENCE: ICD-10-CM

## 2017-09-07 RX ORDER — DEXTROAMPHETAMINE SACCHARATE, AMPHETAMINE ASPARTATE MONOHYDRATE, DEXTROAMPHETAMINE SULFATE AND AMPHETAMINE SULFATE 5; 5; 5; 5 MG/1; MG/1; MG/1; MG/1
20 CAPSULE, EXTENDED RELEASE ORAL DAILY
Qty: 30 CAPSULE | Refills: 0 | Status: SHIPPED | OUTPATIENT
Start: 2017-09-07 | End: 2017-10-19

## 2017-09-07 NOTE — TELEPHONE ENCOUNTER
Reason for Call:  Medication or medication refill:    Do you use a [facility]?  Name of the pharmacy and phone number for the current request:  [facility] [place] - [phone]    Name of the medication requested: adderall 20 mg      Other request: na     Can we leave a detailed message on this number? YES    Phone number patient can be reached at: Home number on file [phone] (home)    Best Time: anytime   Please call when ready-- Bring script to Pharmacy    Call taken on 9/7/2017 at 7:55 AM by Bhavana Madrigal

## 2017-09-07 NOTE — TELEPHONE ENCOUNTER
Please take to clinic pharmacy      Controlled Substance Refill Request for amphetamine-dextroamphetamine (ADDERALL XR) 20 MG per 24 hr capsule  Problem List Complete:  No     PROVIDER TO CONSIDER COMPLETION OF PROBLEM LIST AND OVERVIEW/CONTROLLED SUBSTANCE AGREEMENT    Last Written Prescription Date:  7/14/17  Last Fill Quantity: 30,   # refills: 0    Last Office Visit with [facility] primary care provider: 8/15/17    Future Office visit:     Controlled substance agreement on file: Yes:  Date 12/5/16.     Processing:  Staff will hand deliver Rx to on-site pharmacy     checked in past 6 months?  Yes 6/6/17       Silvia KRAUSE

## 2017-09-18 DIAGNOSIS — J30.89 SEASONAL ALLERGIC RHINITIS DUE TO OTHER ALLERGIC TRIGGER: ICD-10-CM

## 2017-09-18 DIAGNOSIS — J45.40 MODERATE PERSISTENT ASTHMA, UNCOMPLICATED: ICD-10-CM

## 2017-09-18 RX ORDER — MONTELUKAST SODIUM 10 MG/1
TABLET ORAL
Qty: 90 TABLET | OUTPATIENT
Start: 2017-09-18

## 2017-09-18 NOTE — TELEPHONE ENCOUNTER
Singulair      Last Written Prescription Date: 5/4/2017  Last Fill Quantity: 90,  # refills: 1   Last Office Visit with [facility] or [facility] prescribing provider: 8/15/2017    Patient has refills remaining with pharmacy.  Arline Farias RN - Triage  [facility] [place]

## 2017-10-19 ENCOUNTER — TELEPHONE (OUTPATIENT)
Dept: FAMILY MEDICINE | Facility: CLINIC | Age: 52
End: 2017-10-19

## 2017-10-19 DIAGNOSIS — Z79.899 CONTROLLED SUBSTANCE AGREEMENT SIGNED: ICD-10-CM

## 2017-10-19 DIAGNOSIS — F98.8 ATTENTION DEFICIT DISORDER, UNSPECIFIED HYPERACTIVITY PRESENCE: ICD-10-CM

## 2017-10-19 NOTE — TELEPHONE ENCOUNTER
,Reason for Call:  Medication or medication refill:    Do you use a [facility]?  Name of the pharmacy and phone number for the current request:    [facility] [place] - [place] - [address]    Name of the medication requested: amphetamine-dextroamphetamine (ADDERALL XR) 20 MG per 24 hr capsule, amphetamine-dextroamphetamine (ADDERALL) 10 MG per tablet      Other request:     Can we leave a detailed message on this number? YES    Phone number patient can be reached at: Cell number on file:    Telephone Information:   Mobile [phone]       Best Time: anytime     Call taken on 10/19/2017 at 2:47 PM by Carmelita Nava

## 2017-10-25 RX ORDER — DEXTROAMPHETAMINE SACCHARATE, AMPHETAMINE ASPARTATE, DEXTROAMPHETAMINE SULFATE AND AMPHETAMINE SULFATE 2.5; 2.5; 2.5; 2.5 MG/1; MG/1; MG/1; MG/1
TABLET ORAL
Qty: 30 TABLET | Refills: 0 | Status: SHIPPED | OUTPATIENT
Start: 2017-10-25 | End: 2017-10-25

## 2017-10-25 RX ORDER — DEXTROAMPHETAMINE SACCHARATE, AMPHETAMINE ASPARTATE MONOHYDRATE, DEXTROAMPHETAMINE SULFATE AND AMPHETAMINE SULFATE 5; 5; 5; 5 MG/1; MG/1; MG/1; MG/1
20 CAPSULE, EXTENDED RELEASE ORAL DAILY
Qty: 30 CAPSULE | Refills: 0 | Status: SHIPPED | OUTPATIENT
Start: 2017-10-25 | End: 2017-10-25

## 2017-10-25 RX ORDER — DEXTROAMPHETAMINE SACCHARATE, AMPHETAMINE ASPARTATE, DEXTROAMPHETAMINE SULFATE AND AMPHETAMINE SULFATE 2.5; 2.5; 2.5; 2.5 MG/1; MG/1; MG/1; MG/1
TABLET ORAL
Qty: 30 TABLET | Refills: 0 | Status: SHIPPED | OUTPATIENT
Start: 2017-10-25 | End: 2017-11-27

## 2017-10-25 RX ORDER — DEXTROAMPHETAMINE SACCHARATE, AMPHETAMINE ASPARTATE MONOHYDRATE, DEXTROAMPHETAMINE SULFATE AND AMPHETAMINE SULFATE 5; 5; 5; 5 MG/1; MG/1; MG/1; MG/1
20 CAPSULE, EXTENDED RELEASE ORAL DAILY
Qty: 30 CAPSULE | Refills: 0 | Status: SHIPPED | OUTPATIENT
Start: 2017-10-25 | End: 2017-11-27

## 2017-10-25 NOTE — TELEPHONE ENCOUNTER
Patient uses the clinic pharmacy  It wasn't ana'd up  TC gave scripts back to PCP to redo please  Silvia KRAUSE

## 2017-11-06 DIAGNOSIS — J45.40 MODERATE PERSISTENT ASTHMA, UNCOMPLICATED: ICD-10-CM

## 2017-11-06 RX ORDER — MONTELUKAST SODIUM 10 MG/1
TABLET ORAL
Qty: 90 TABLET | Refills: 1 | Status: SHIPPED | OUTPATIENT
Start: 2017-11-06 | End: 2018-04-05

## 2017-11-06 NOTE — TELEPHONE ENCOUNTER
Routing refill request to provider for review/approval because:  ACT < FMG protocol  Arline Farias RN - Triage  [facility] [place] [facility]

## 2017-11-13 ENCOUNTER — OFFICE VISIT (OUTPATIENT)
Dept: FAMILY MEDICINE | Facility: CLINIC | Age: 52
End: 2017-11-13
Payer: COMMERCIAL

## 2017-11-13 ENCOUNTER — RADIANT APPOINTMENT (OUTPATIENT)
Dept: GENERAL RADIOLOGY | Facility: CLINIC | Age: 52
End: 2017-11-13
Attending: FAMILY MEDICINE
Payer: COMMERCIAL

## 2017-11-13 VITALS
HEART RATE: 60 BPM | BODY MASS INDEX: 37.38 KG/M2 | SYSTOLIC BLOOD PRESSURE: 118 MMHG | DIASTOLIC BLOOD PRESSURE: 70 MMHG | OXYGEN SATURATION: 99 % | HEIGHT: 72 IN | WEIGHT: 276 LBS | TEMPERATURE: 97 F

## 2017-11-13 DIAGNOSIS — J45.41 MODERATE PERSISTENT ASTHMA WITH EXACERBATION: ICD-10-CM

## 2017-11-13 DIAGNOSIS — M25.562 LEFT KNEE PAIN, UNSPECIFIED CHRONICITY: ICD-10-CM

## 2017-11-13 DIAGNOSIS — Z01.818 PREOP GENERAL PHYSICAL EXAM: Primary | ICD-10-CM

## 2017-11-13 DIAGNOSIS — R79.89 ABNORMAL THYROID BLOOD TEST: ICD-10-CM

## 2017-11-13 LAB
ALBUMIN SERPL-MCNC: 3.5 G/DL (ref 3.4–5)
ALP SERPL-CCNC: 96 U/L (ref 40–150)
ALT SERPL W P-5'-P-CCNC: 113 U/L (ref 0–70)
ANION GAP SERPL CALCULATED.3IONS-SCNC: 8 MMOL/L (ref 3–14)
AST SERPL W P-5'-P-CCNC: 64 U/L (ref 0–45)
BILIRUB SERPL-MCNC: 1.2 MG/DL (ref 0.2–1.3)
BUN SERPL-MCNC: 11 MG/DL (ref 7–30)
CALCIUM SERPL-MCNC: 8.9 MG/DL (ref 8.5–10.1)
CHLORIDE SERPL-SCNC: 108 MMOL/L (ref 94–109)
CO2 SERPL-SCNC: 23 MMOL/L (ref 20–32)
CREAT SERPL-MCNC: 0.57 MG/DL (ref 0.66–1.25)
ERYTHROCYTE [DISTWIDTH] IN BLOOD BY AUTOMATED COUNT: 13.3 % (ref 10–15)
GFR SERPL CREATININE-BSD FRML MDRD: >90 ML/MIN/1.7M2
GLUCOSE SERPL-MCNC: 96 MG/DL (ref 70–99)
HCT VFR BLD AUTO: 46.9 % (ref 40–53)
HGB BLD-MCNC: 16.8 G/DL (ref 13.3–17.7)
MCH RBC QN AUTO: 31.9 PG (ref 26.5–33)
MCHC RBC AUTO-ENTMCNC: 35.8 G/DL (ref 31.5–36.5)
MCV RBC AUTO: 89 FL (ref 78–100)
PLATELET # BLD AUTO: 172 10E9/L (ref 150–450)
POTASSIUM SERPL-SCNC: 3.9 MMOL/L (ref 3.4–5.3)
PROT SERPL-MCNC: 6.9 G/DL (ref 6.8–8.8)
RBC # BLD AUTO: 5.27 10E12/L (ref 4.4–5.9)
SODIUM SERPL-SCNC: 139 MMOL/L (ref 133–144)
TSH SERPL DL<=0.005 MIU/L-ACNC: 0.6 MU/L (ref 0.4–4)
WBC # BLD AUTO: 3.8 10E9/L (ref 4–11)

## 2017-11-13 PROCEDURE — 99215 OFFICE O/P EST HI 40 MIN: CPT | Performed by: FAMILY MEDICINE

## 2017-11-13 PROCEDURE — 71020 XR CHEST 2 VW: CPT

## 2017-11-13 PROCEDURE — 85027 COMPLETE CBC AUTOMATED: CPT | Performed by: FAMILY MEDICINE

## 2017-11-13 PROCEDURE — 80053 COMPREHEN METABOLIC PANEL: CPT | Performed by: FAMILY MEDICINE

## 2017-11-13 PROCEDURE — 36415 COLL VENOUS BLD VENIPUNCTURE: CPT | Performed by: FAMILY MEDICINE

## 2017-11-13 PROCEDURE — 84443 ASSAY THYROID STIM HORMONE: CPT | Performed by: FAMILY MEDICINE

## 2017-11-13 RX ORDER — PREDNISONE 20 MG/1
20 TABLET ORAL 2 TIMES DAILY
Qty: 10 TABLET | Refills: 0 | Status: SHIPPED | OUTPATIENT
Start: 2017-11-13 | End: 2017-11-18

## 2017-11-13 NOTE — PROGRESS NOTES
[facility]  [address] [address]  [phone]  Dept: [phone]    PRE-OP EVALUATION:  Today's date: 2017    Orlando Holder (: 1965) presents for pre-operative evaluation assessment as requested by Dr. Andrade Medeiros.  He requires evaluation and anesthesia risk assessment prior to undergoing surgery/procedure for treatment of Left knee .  Proposed procedure: Orthoscopy     Date of Surgery/ Procedure: 2017  Time of Surgery/ Procedure: Afternoon   Hospital/Surgical Facility: [facility]   Fax number for surgical facility: [phone]  Primary Physician: Kay Ramirez  Type of Anesthesia Anticipated: General    Patient has a Health Care Directive or Living Will:  NO    1. NO - Do you have a history of heart attack, stroke, stent, bypass or surgery on an artery in the head, neck, heart or legs?  2. NO - Do you ever have any pain or discomfort in your chest?  3. NO - Do you have a history of  Heart Failure?  4. NO - Are you troubled by shortness of breath when: walking on the level, up a slight hill or at night?  5. NO - Do you currently have a cold, bronchitis or other respiratory infection?  6. YES - Do you have a cough, shortness of breath or wheezing? Had cold last week, it is better. aggravated asthma as well although he is feeling a bit better now. Still needing albuterol daily. He also ran out of Rival[employer] for couple of              weeks , so now back on it   7. NO - Do you sometimes get pains in the calves of your legs when you walk?  8. NO - Do you or anyone in your family have previous history of blood clots?  9. NO - Do you or does anyone in your family have a serious bleeding problem such as prolonged bleeding following surgeries or cuts?  10. NO - Have you ever had problems with anemia or been told to take iron pills?  11. NO - Have you had any abnormal blood loss such as black, tarry or bloody stools,  or abnormal vaginal bleeding?  12. NO - Have you ever had a blood transfusion?  13. NO - Have you or any of your relatives ever had problems with anesthesia?  14. YES - Do you have sleep apnea, excessive snoring or daytime drowsiness?  15. NO - Do you have any prosthetic heart valves?  16. NO - Do you have prosthetic joints?  17. NO - Is there any chance that you may be pregnant?        HPI:                                                      Brief HPI related to upcoming procedure:       See problem list for active medical problems.  Problems all longstanding and stable, except as noted/documented.  See ROS for pertinent symptoms related to these conditions.                                                                                                  .  ASTHMA - Patient has a longstanding history of moderate-severe Asthma . Patient has been doing well overall except recent aggravation with cold , as above       MEDICAL HISTORY:                                                    Patient Active Problem List    Diagnosis Date Noted     BMI 38.0-38.9,adult 03/01/2017     Priority: Medium     Obesity, unspecified obesity severity, unspecified obesity type 03/01/2017     Priority: Medium     Abnormal thyroid blood test 03/01/2017     Priority: Medium     low TSH, t4 normal 02/2017       Elevated fasting glucose 03/01/2017     Priority: Medium     ADD (attention deficit disorder) 12/05/2016     Priority: Medium     Controlled substance agreement signed 12/05/2016     Priority: Medium     Seasonal allergic rhinitis, unspecified allergic rhinitis trigger 12/05/2016     Priority: Medium     Moderate persistent asthma without complication 12/05/2016     Priority: Medium     History of colonic polyps 08/31/2015     Priority: Medium     5 yr f/u recommended        Obesity 08/16/2013     Priority: Medium     Achilles tendonitis 08/16/2011     Priority: Medium     Anal fissure 08/16/2011     Priority: Medium     Erectile  dysfunction 08/08/2011     Priority: Medium     Common wart 08/08/2011     Priority: Medium     CARDIOVASCULAR SCREENING; LDL GOAL LESS THAN 160 10/31/2010     Priority: Medium     Difficulty concentrating 10/08/2008     Priority: Medium     Nevus 10/08/2008     Priority: Medium     Nonspecific abnormal results of liver function study 01/16/2004     Priority: Medium      Past Medical History:   Diagnosis Date     Allergic rhinitis, cause unspecified      Contact dermatitis and other eczema, due to unspecified cause     nummular/asteatotic     Herpes zoster 6/2015    right ear and neck     Moderate persistent asthma      Unspecified sinusitis (chronic)      Unspecified sleep apnea     C-Pap     Past Surgical History:   Procedure Laterality Date     C NONSPECIFIC PROCEDURE  2004    repair deviated septum     HC REPAIR INCISIONAL HERNIA,REDUCIBLE  1997    Hernia Repair,  inguinal, Unilateral x2 on rt.     Current Outpatient Prescriptions   Medication Sig Dispense Refill     montelukast (SINGULAIR) 10 MG tablet TAKE 1 TABLET BY MOUTH AT  BEDTIME 90 tablet 1     amphetamine-dextroamphetamine (ADDERALL XR) 20 MG per 24 hr capsule Take 1 capsule (20 mg) by mouth daily 30 capsule 0     amphetamine-dextroamphetamine (ADDERALL) 10 MG per tablet Take one tablet at 1 pm 30 tablet 0     cephALEXin (KEFLEX) 500 MG capsule Take 1 capsule (500 mg) by mouth 3 times daily 30 capsule 0     albuterol (ALBUTEROL) 108 (90 BASE) MCG/ACT Inhaler Inhale 1-2 puffs into the lungs 4 times daily as needed 1 Inhaler 1     fluticasone-salmeterol (ADVAIR DISKUS) 500-50 MCG/DOSE diskus inhaler Inhale 1 puff into the lungs 2 times daily 3 Inhaler 1     fluticasone (FLONASE) 50 MCG/ACT spray Spray 1-2 sprays into both nostrils daily 3 Bottle 3     fish oil-omega-3 fatty acids (FISH OIL) 1000 MG capsule Take 2 g by mouth daily.       Glucosamine-Chondroit-Vit C-Mn (GLUCOSAMINE 1500 COMPLEX PO) Take 1 tablet by mouth daily.       Cholecalciferol  (VITAMIN D PO) Take  by mouth. 5000 units       BIFIDOBACTERIUM BIFIDUM        sildenafil (VIAGRA) 100 MG tablet Take 1 tablet by mouth daily as needed for erectile dysfunction. 10 tablet 12     WAL-ITIN 10 MG OR TABS 1 TABLET DAILY 30 0     MULTI-VITAMIN OR TABS 1 tab q  day  0     OTC products: None, except as noted above    Allergies   Allergen Reactions     No Known Drug Allergies       Latex Allergy: NO    Social History   Substance Use Topics     Smoking status: Former Smoker     Packs/day: 0.50     Years: 20.00     Types: Cigarettes     Quit date: 3/26/2001     Smokeless tobacco: Never Used     Alcohol use 0.0 oz/week     0 Standard drinks or equivalent per week     History   Drug Use No       REVIEW OF SYSTEMS:                                                    C: NEGATIVE for fever, chills, change in weight  I: NEGATIVE for worrisome rashes, moles or lesions  E: NEGATIVE for vision changes or irritation  E/M: NEGATIVE for ear, mouth and throat problems  RESP:as above  CV: NEGATIVE for chest pain, palpitations or peripheral edema  GI: NEGATIVE for nausea, abdominal pain, heartburn, or change in bowel habits  : NEGATIVE for frequency, dysuria, or hematuria  M: NEGATIVE for significant arthralgias or myalgia  N: NEGATIVE for weakness, dizziness or paresthesias  E: NEGATIVE for temperature intolerance, skin/hair changes  H: NEGATIVE for bleeding problems  P: NEGATIVE for changes in mood or affect    EXAM:                                                    There were no vitals taken for this visit.    GENERAL APPEARANCE: healthy, alert and no distress     EYES: EOMI,  PERRL     HENT: ear canals and TM's normal and nose and mouth without ulcers or lesions     NECK: no adenopathy, no asymmetry, masses, or scars and thyroid normal to palpation     RESP: lungs  auscultation - no rales, rhonchi but has few scattered bilateral  wheezes     CV: regular rates and rhythm, normal S1 S2, no S3 or S4 and no murmur,      ABDOMEN:  soft, nontender, no HSM or masses and bowel sounds normal     MS: no  ankle edema     SKIN: no suspicious lesions or rashes     NEURO: Normal strength and tone, sensory exam grossly normal, mentation intact and speech normal     PSYCH: mentation appears normal. and affect normal/bright    DIAGNOSTICS:                                                      Labs Resulted Today: cbc, cmp   Results for orders placed or performed in visit on 03/01/17   Hemoglobin A1c   Result Value Ref Range    Hemoglobin A1C 4.9 4.3 - 6.0 %       Recent Labs   Lab Test  03/01/17   0726 03/27/15  08/16/13   0809  06/25/12   0817   NA   --    --   141  139   POTASSIUM   --   4.1  4.1  3.8   CR   --   0.69  0.68  0.78   A1C  4.9   --    --    --         IMPRESSION:                                                        The proposed surgical procedure is considered INTERMEDIATE risk.    REVISED CARDIAC RISK INDEX  The patient has the following serious cardiovascular risks for perioperative complications such as (MI, PE, VFib and 3  AV Block):  No serious cardiac risks  INTERPRETATION: 1 risks: Class II (low risk - 0.9% complication rate)    The patient has the following additional risks for perioperative complications:  No identified additional risks  Has hx of asthma, but stable on current med's, will need monitoring         RECOMMENDATIONS:                                                    (Z01.818) Preop general physical exam  (primary encounter diagnosis)  Comment:   Plan: Comprehensive metabolic panel, CBC with         platelets            (M25.562) Left knee pain, unspecified chronicity  Comment: meniscal related   Plan:     (J45.41) Moderate persistent asthma with exacerbation  Comment: recent aggravation with URI, has some wheezing and needing albuterol frequently   Plan: XR Chest 2 Views, predniSONE (DELTASONE) 20 MG         tablet        Will do short burst of oral steroid to help him feel better. Also get base line chest  xray. Hope that he will improve before surgery. but if any worsening or problem, before surgery , told pt to do follow up here before surgery         Pulmonary Risk  Will need monitoring. Consult IM, in house if needed.     APPROVAL GIVEN to proceed with proposed procedure, without further diagnostic evaluation, if his asthma goes back to normal after steroid burst. F/u as needed        Signed Electronically by: Kay Ramirez MD    Copy of this evaluation report is provided to requesting physician.    [place] Preop Guidelines

## 2017-11-13 NOTE — NURSING NOTE
"Chief Complaint   Patient presents with     Pre-Op Exam       Initial /70  Pulse 60  Temp 97  F (36.1  C) (Tympanic)  Ht 5' 11.5\" (1.816 m)  Wt 276 lb (125.2 kg)  SpO2 99%  BMI 37.96 kg/m2 Estimated body mass index is 37.96 kg/(m^2) as calculated from the following:    Height as of this encounter: 5' 11.5\" (1.816 m).    Weight as of this encounter: 276 lb (125.2 kg).  Medication Reconciliation: complete  "

## 2017-11-13 NOTE — MR AVS SNAPSHOT
After Visit Summary   11/13/2017    Orlando Holder    MRN: [id number]           Patient Information     Date Of Birth          1965        Visit Information        Provider Department      11/13/2017 7:40 AM [facility]        Today's Diagnoses     Preop general physical exam    -  1    Left knee pain, unspecified chronicity        Moderate persistent asthma with exacerbation        Abnormal thyroid blood test          Care Instructions      Before Your Surgery      Call your surgeon if there is any change in your health. This includes signs of a cold or flu (such as a sore throat, runny nose, cough, rash or fever).    Do not smoke, drink alcohol or take over the counter medicine (unless your surgeon or primary care doctor tells you to) for the 24 hours before and after surgery.    If you take prescribed drugs: Follow your doctor s orders about which medicines to take and which to stop until after surgery.    Eating and drinking prior to surgery: follow the instructions from your surgeon    Take a shower or bath the night before surgery. Use the soap your surgeon gave you to gently clean your skin. If you do not have soap from your surgeon, use your regular soap. Do not shave or scrub the surgery site.  Wear clean pajamas and have clean sheets on your bed.           Follow-ups after your visit        Who to contact     If you have questions or need follow up information about today's clinic visit or your schedule please contact [facility] [place] directly at [phone].  Normal or non-critical lab and imaging results will be communicated to you by MyChart, letter or phone within 4 business days after the clinic has received the results. If you do not hear from us within 7 days, please contact the clinic through MyChart or phone. If you have a critical or abnormal lab result, we will notify you by phone as soon as possible.  Submit  "refill requests through [employer] or call your pharmacy and they will forward the refill request to us. Please allow 3 business days for your refill to be completed.          Additional Information About Your Visit        [employer] Information     [employer] gives you secure access to your electronic health record. If you see a primary care provider, you can also send messages to your care team and make appointments. If you have questions, please call your primary care clinic.  If you do not have a primary care provider, please call [phone] and they will assist you.        Care EveryWhere ID     This is your Care EveryWhere ID. This could be used by other organizations to access your [place] medical records  [id number]        Your Vitals Were     Pulse Temperature Height Pulse Oximetry BMI (Body Mass Index)       60 97  F (36.1  C) (Tympanic) 5' 11.5\" (1.816 m) 99% 37.96 kg/m2        Blood Pressure from Last 3 Encounters:   11/13/17 118/70   08/15/17 133/87   07/14/17 121/76    Weight from Last 3 Encounters:   11/13/17 276 lb (125.2 kg)   08/15/17 278 lb (126.1 kg)   07/14/17 277 lb (125.6 kg)              We Performed the Following     CBC with platelets     Comprehensive metabolic panel     TSH with free T4 reflex          Today's Medication Changes          These changes are accurate as of: 11/13/17 12:42 PM.  If you have any questions, ask your nurse or doctor.               Start taking these medicines.        Dose/Directions    predniSONE 20 MG tablet   Commonly known as:  DELTASONE   Used for:  Moderate persistent asthma with exacerbation   Started by:  Kay Ramirez MD        Dose:  20 mg   Take 1 tablet (20 mg) by mouth 2 times daily for 5 days   Quantity:  10 tablet   Refills:  0            Where to get your medicines      These medications were sent to [facility] [place] - [place], [place] - [address]  [address], [place] [address]     Phone:  " [phone]     predniSONE 20 MG tablet                Primary Care Provider Office Phone # Fax #    Kay Ramirez [phone]       [address]  [place] [address]        Equal Access to Services     [facility] REX : Hadii aad ku hadasho Soomaali, waaxda luqadaha, qaybta kaalmada adeegyada, waxay idiin hayaan adeeg khyuly la'duyenn ah. So [facility] [phone].    ATENCIÓN: Si habla español, tiene a rudolph disposición servicios gratuitos de asistencia lingüística. Llame al [phone].    We comply with applicable federal civil rights laws and [place] laws. We do not discriminate on the basis of race, color, national origin, age, disability, sex, sexual orientation, or gender identity.            Thank you!     Thank you for choosing [facility] [place]  for your care. Our goal is always to provide you with excellent care. Hearing back from our patients is one way we can continue to improve our services. Please take a few minutes to complete the written survey that you may receive in the mail after your visit with us. Thank you!             Your Updated Medication List - Protect others around you: Learn how to safely use, store and throw away your medicines at www.disposemymeds.org.          This list is accurate as of: 11/13/17 12:42 PM.  Always use your most recent med list.                   Brand Name Dispense Instructions for use Diagnosis    albuterol 108 (90 BASE) MCG/ACT Inhaler    PROAIR HFA    1 Inhaler    Inhale 1-2 puffs into the lungs 4 times daily as needed    Moderate persistent asthma, uncomplicated       * amphetamine-dextroamphetamine 20 MG per 24 hr capsule    ADDERALL XR    30 capsule    Take 1 capsule (20 mg) by mouth daily    Controlled substance agreement signed, Attention deficit disorder, unspecified hyperactivity presence       * amphetamine-dextroamphetamine 10 MG per tablet    ADDERALL    30 tablet    Take one tablet at 1 pm    Controlled substance  agreement signed, Attention deficit disorder, unspecified hyperactivity presence       BIFIDOBACTERIUM BIFIDUM           fish oil-omega-3 fatty acids 1000 MG capsule      Take 2 g by mouth daily.        fluticasone 50 MCG/ACT spray    FLONASE    3 Bottle    Spray 1-2 sprays into both nostrils daily    Seasonal allergic rhinitis due to other allergic trigger       fluticasone-salmeterol 500-50 MCG/DOSE diskus inhaler    ADVAIR DISKUS    3 Inhaler    Inhale 1 puff into the lungs 2 times daily    Moderate persistent asthma, uncomplicated       GLUCOSAMINE 1500 COMPLEX PO      Take 1 tablet by mouth daily.        montelukast 10 MG tablet    SINGULAIR    90 tablet    TAKE 1 TABLET BY MOUTH AT  BEDTIME    Moderate persistent asthma, uncomplicated       Multi-vitamin Tabs tablet   Generic drug:  multivitamin, therapeutic with minerals      1 tab q  day        predniSONE 20 MG tablet    DELTASONE    10 tablet    Take 1 tablet (20 mg) by mouth 2 times daily for 5 days    Moderate persistent asthma with exacerbation       sildenafil 100 MG tablet    VIAGRA    10 tablet    Take 1 tablet by mouth daily as needed for erectile dysfunction.    Erectile dysfunction       VITAMIN D PO      Take  by mouth. 5000 units        WAL-ITIN 10 MG tablet   Generic drug:  loratadine     30    1 TABLET DAILY        * Notice:  This list has 2 medication(s) that are the same as other medications prescribed for you. Read the directions carefully, and ask your doctor or other care provider to review them with you.

## 2017-11-14 ASSESSMENT — ASTHMA QUESTIONNAIRES: ACT_TOTALSCORE: 25

## 2017-11-27 DIAGNOSIS — F98.8 ATTENTION DEFICIT DISORDER, UNSPECIFIED HYPERACTIVITY PRESENCE: ICD-10-CM

## 2017-11-27 DIAGNOSIS — Z79.899 CONTROLLED SUBSTANCE AGREEMENT SIGNED: ICD-10-CM

## 2017-11-29 RX ORDER — DEXTROAMPHETAMINE SACCHARATE, AMPHETAMINE ASPARTATE, DEXTROAMPHETAMINE SULFATE AND AMPHETAMINE SULFATE 2.5; 2.5; 2.5; 2.5 MG/1; MG/1; MG/1; MG/1
TABLET ORAL
Qty: 30 TABLET | Refills: 0 | Status: SHIPPED | OUTPATIENT
Start: 2017-11-29 | End: 2018-04-05

## 2017-11-29 RX ORDER — DEXTROAMPHETAMINE SACCHARATE, AMPHETAMINE ASPARTATE MONOHYDRATE, DEXTROAMPHETAMINE SULFATE AND AMPHETAMINE SULFATE 5; 5; 5; 5 MG/1; MG/1; MG/1; MG/1
20 CAPSULE, EXTENDED RELEASE ORAL DAILY
Qty: 30 CAPSULE | Refills: 0 | Status: SHIPPED | OUTPATIENT
Start: 2017-11-29 | End: 2018-01-16

## 2017-11-29 NOTE — TELEPHONE ENCOUNTER
Routing refill request to provider for review/approval because:  Drug not on the [facility], [facility] or [facility] refill protocol or controlled substance

## 2017-12-25 DIAGNOSIS — J45.40 MODERATE PERSISTENT ASTHMA, UNCOMPLICATED: ICD-10-CM

## 2017-12-26 NOTE — TELEPHONE ENCOUNTER
Requested Prescriptions   Pending Prescriptions Disp Refills     ADVAIR DISKUS 500-50 MCG/DOSE diskus inhaler [Pharmacy Med Name: ADVAIR  500MCG 50MCG  INH  DISKU]  Last Written Prescription Date:  5/4/17  Last Fill Quantity: 3,  # refills: 1   Last Office Visit with [facility], [facility] or [facility] prescribing provider:  11/13/17   Future Office Visit:            Sig: USE 1 INHALATION TWO TIMES  DAILY    Inhaled Steroids Protocol Passed    12/25/2017  4:30 AM       Passed - Patient is age 12 or older       Passed - Asthma control test 20 or greater in last 6 months    Please review ACT score.   ACT Total Scores 5/4/2017 7/14/2017 11/13/2017   ACT TOTAL SCORE - - -   ASTHMA ER VISITS - - -   ASTHMA HOSPITALIZATIONS - - -   ACT TOTAL SCORE (Goal Greater than or Equal to 20) 23 19 25   In the past 12 months, how many times did you visit the emergency room for your asthma without being admitted to the hospital? 0 0 0   In the past 12 months, how many times were you hospitalized overnight because of your asthma? 0 0 0            Passed - Recent (6 mo) or future visit with authorizing provider's specialty    Patient had office visit in the last 6 months or has a visit in the next 30 days with authorizing provider.  See chart review.

## 2018-01-16 DIAGNOSIS — F98.8 ATTENTION DEFICIT DISORDER, UNSPECIFIED HYPERACTIVITY PRESENCE: ICD-10-CM

## 2018-01-16 DIAGNOSIS — Z79.899 CONTROLLED SUBSTANCE AGREEMENT SIGNED: ICD-10-CM

## 2018-01-16 NOTE — TELEPHONE ENCOUNTER
Adderall      Last Written Prescription Date:  11/29/2017  Last Fill Quantity: 30,   # refills: 0  Last Office Visit: 11/13/2017  Future Office visit:       Routing refill request to provider for review/approval because:  Drug not on the [facility], [facility] or [facility] refill protocol or controlled substance

## 2018-01-16 NOTE — TELEPHONE ENCOUNTER
Reason for Call:  Medication or medication refill:    Do you use a [facility]?  Name of the pharmacy and phone number for the current request:  [facility] - [phone]    Name of the medication requested: adderall 20 mg    Other request: na     Can we leave a detailed message on this number? YES    Phone number patient can be reached at: Home number on file [phone] (home)    Best Time: anytime,  Bring script to  Pharmacy,  Call pt when completed      Call taken on 1/16/2018 at 11:22 AM by Bhavana Madrigal

## 2018-01-17 RX ORDER — DEXTROAMPHETAMINE SACCHARATE, AMPHETAMINE ASPARTATE MONOHYDRATE, DEXTROAMPHETAMINE SULFATE AND AMPHETAMINE SULFATE 5; 5; 5; 5 MG/1; MG/1; MG/1; MG/1
20 CAPSULE, EXTENDED RELEASE ORAL DAILY
Qty: 30 CAPSULE | Refills: 0 | Status: SHIPPED | OUTPATIENT
Start: 2018-01-17 | End: 2018-03-06

## 2018-01-17 NOTE — TELEPHONE ENCOUNTER
Script printed on Team 1 fax  Placed on Provider desk for signature when back in clinic 1/18/18  Silvia KRAUSE

## 2018-01-26 ENCOUNTER — TELEPHONE (OUTPATIENT)
Dept: FAMILY MEDICINE | Facility: CLINIC | Age: 53
End: 2018-01-26

## 2018-01-26 NOTE — TELEPHONE ENCOUNTER
Orlando Holder is a 52 year old male who calls with derrick and SUSANA. Patient is able to speak in complete sentences. States that he had a cold a few weeks ago. States that lately he has been using his inhaler more and more. Thinks that the cold weather and recent travel triggered his asthma flare.    NURSING ASSESSMENT:  Description:  above  Onset/duration:  today  Precip. factors:  Recent URI, travel, cold weather  Associated symptoms:  none  Improves/worsens symptoms:  inhaler  Pain scale (0-10)   0/10, no mention of pain    Last exam/Treatment:  12/28/17  Allergies:   Allergies   Allergen Reactions     No Known Drug Allergies        MEDICATIONS:   Taking medication(s) as prescribed? Yes  Taking over the counter medication(s?) No  Any medication side effects? No significant side effects    Any barriers to taking medication(s) as prescribed?  No  Medication(s) improving/managing symptoms?  No  Medication reconciliation completed: No      NURSING PLAN: Routed to provider no appts available in clinic today, recommended UC, Patient refused. Advised E visit    RECOMMENDED DISPOSITION:  See in 4 hours or E visit  Will comply with recommendation: No- Barriers to comply with plan of care frustrated no appts available in clinic, states that he will thinks about what he will do.  If further questions/concerns or if symptoms do not improve, worsen or new symptoms develop, call your PCP or [facility] as soon as possible.      Guideline used:  Telephone Triage Protocols for Nurses, Fourth Edition, Clotilde Quinn RN

## 2018-03-06 DIAGNOSIS — J45.40 MODERATE PERSISTENT ASTHMA WITHOUT COMPLICATION: ICD-10-CM

## 2018-03-06 DIAGNOSIS — Z79.899 CONTROLLED SUBSTANCE AGREEMENT SIGNED: ICD-10-CM

## 2018-03-06 DIAGNOSIS — F98.8 ATTENTION DEFICIT DISORDER, UNSPECIFIED HYPERACTIVITY PRESENCE: ICD-10-CM

## 2018-03-06 NOTE — TELEPHONE ENCOUNTER
"Requested Prescriptions   Pending Prescriptions Disp Refills     ADVAIR DISKUS 500-50 MCG/DOSE diskus inhaler [Pharmacy Med Name: ADVAIR 500-50 DISKUS]  3    Last Written Prescription Date:  12/26/17  Last Fill Quantity: 180,  # refills: 1   Last office visit: 11/13/2017 with prescribing provider:  11/13/17   Future Office Visit:     Sig: INHALE 1 PUFF BY MOUTH TWICE DAILY. RINSE/GARGLE AFTER USE    Inhaled Steroids Protocol Passed    3/6/2018  9:54 AM   ACT Total Scores 5/4/2017 7/14/2017 11/13/2017   ACT TOTAL SCORE - - -   ASTHMA ER VISITS - - -   ASTHMA HOSPITALIZATIONS - - -   ACT TOTAL SCORE (Goal Greater than or Equal to 20) 23 19 25   In the past 12 months, how many times did you visit the emergency room for your asthma without being admitted to the hospital? 0 0 0   In the past 12 months, how many times were you hospitalized overnight because of your asthma? 0 0 0         Passed - Patient is age 12 or older       Passed - Asthma control assessment score within normal limits in last 6 months    Please review ACT score.          Passed - Recent (6 mo) or future (30 days) visit within the authorizing provider's specialty    Patient had office visit in the last 6 months or has a visit in the next 30 days with authorizing provider.  See \"Patient Info\" tab in inbasket, or \"Choose Columns\" in Meds & Orders section of the refill encounter.              "

## 2018-03-06 NOTE — TELEPHONE ENCOUNTER
Adderall XR 20 mg - please deliver to  [facility] pharmacy and notify pt at [phone]  Last Written Prescription Date:  1/17/2018  Last Fill Quantity: 30,   # refills: 0  Last Office Visit: 11/13/2017  Future Office visit:       Routing refill request to provider for review/approval because:  Drug not on the [facility], [facility] or [facility] refill protocol or controlled substance

## 2018-03-08 RX ORDER — DEXTROAMPHETAMINE SACCHARATE, AMPHETAMINE ASPARTATE MONOHYDRATE, DEXTROAMPHETAMINE SULFATE AND AMPHETAMINE SULFATE 5; 5; 5; 5 MG/1; MG/1; MG/1; MG/1
20 CAPSULE, EXTENDED RELEASE ORAL DAILY
Qty: 30 CAPSULE | Refills: 0 | Status: SHIPPED | OUTPATIENT
Start: 2018-03-08 | End: 2018-03-09

## 2018-03-09 DIAGNOSIS — Z79.899 CONTROLLED SUBSTANCE AGREEMENT SIGNED: ICD-10-CM

## 2018-03-09 DIAGNOSIS — F98.8 ATTENTION DEFICIT DISORDER, UNSPECIFIED HYPERACTIVITY PRESENCE: ICD-10-CM

## 2018-03-09 RX ORDER — DEXTROAMPHETAMINE SACCHARATE, AMPHETAMINE ASPARTATE MONOHYDRATE, DEXTROAMPHETAMINE SULFATE AND AMPHETAMINE SULFATE 5; 5; 5; 5 MG/1; MG/1; MG/1; MG/1
20 CAPSULE, EXTENDED RELEASE ORAL DAILY
Qty: 30 CAPSULE | Refills: 0 | Status: SHIPPED | OUTPATIENT
Start: 2018-03-09 | End: 2018-04-05

## 2018-03-09 NOTE — TELEPHONE ENCOUNTER
Dr Ramirez aproved the refill but did not sign the rx. Original rx destroyed and witnessed by Arline Morales MA and Sandra Anna MA. Thank you.  Adderall XR 20 mg    - please deliver to [facility] pharmacy and notify pt at [phone]  Last Written Prescription Date:  1/17/2018  Last Fill Quantity: 30,   # refills: 0  Last Office Visit: 11/13/2017  Future Office visit:       Routing refill request to provider for review/approval because:  Drug not on the [facility], [facility] or [facility] refill protocol or controlled substance

## 2018-04-05 ENCOUNTER — OFFICE VISIT (OUTPATIENT)
Dept: FAMILY MEDICINE | Facility: CLINIC | Age: 53
End: 2018-04-05
Payer: COMMERCIAL

## 2018-04-05 VITALS
OXYGEN SATURATION: 97 % | HEART RATE: 63 BPM | SYSTOLIC BLOOD PRESSURE: 120 MMHG | BODY MASS INDEX: 38.87 KG/M2 | HEIGHT: 72 IN | WEIGHT: 287 LBS | DIASTOLIC BLOOD PRESSURE: 74 MMHG | TEMPERATURE: 97.3 F

## 2018-04-05 DIAGNOSIS — J45.40 MODERATE PERSISTENT ASTHMA, UNCOMPLICATED: Primary | ICD-10-CM

## 2018-04-05 DIAGNOSIS — F98.8 ATTENTION DEFICIT DISORDER, UNSPECIFIED HYPERACTIVITY PRESENCE: ICD-10-CM

## 2018-04-05 DIAGNOSIS — Z79.899 CONTROLLED SUBSTANCE AGREEMENT SIGNED: ICD-10-CM

## 2018-04-05 PROCEDURE — 99214 OFFICE O/P EST MOD 30 MIN: CPT | Performed by: FAMILY MEDICINE

## 2018-04-05 RX ORDER — DEXTROAMPHETAMINE SACCHARATE, AMPHETAMINE ASPARTATE, DEXTROAMPHETAMINE SULFATE AND AMPHETAMINE SULFATE 2.5; 2.5; 2.5; 2.5 MG/1; MG/1; MG/1; MG/1
TABLET ORAL
Qty: 30 TABLET | Refills: 0 | Status: SHIPPED | OUTPATIENT
Start: 2018-04-09 | End: 2018-07-24

## 2018-04-05 RX ORDER — DEXTROAMPHETAMINE SACCHARATE, AMPHETAMINE ASPARTATE MONOHYDRATE, DEXTROAMPHETAMINE SULFATE AND AMPHETAMINE SULFATE 5; 5; 5; 5 MG/1; MG/1; MG/1; MG/1
20 CAPSULE, EXTENDED RELEASE ORAL DAILY
Qty: 30 CAPSULE | Refills: 0 | Status: SHIPPED | OUTPATIENT
Start: 2018-06-09 | End: 2018-07-24

## 2018-04-05 RX ORDER — DEXTROAMPHETAMINE SACCHARATE, AMPHETAMINE ASPARTATE MONOHYDRATE, DEXTROAMPHETAMINE SULFATE AND AMPHETAMINE SULFATE 5; 5; 5; 5 MG/1; MG/1; MG/1; MG/1
20 CAPSULE, EXTENDED RELEASE ORAL DAILY
Qty: 30 CAPSULE | Refills: 0 | Status: SHIPPED | OUTPATIENT
Start: 2018-04-09 | End: 2018-04-05

## 2018-04-05 RX ORDER — DEXTROAMPHETAMINE SACCHARATE, AMPHETAMINE ASPARTATE MONOHYDRATE, DEXTROAMPHETAMINE SULFATE AND AMPHETAMINE SULFATE 5; 5; 5; 5 MG/1; MG/1; MG/1; MG/1
20 CAPSULE, EXTENDED RELEASE ORAL DAILY
Qty: 30 CAPSULE | Refills: 0 | Status: SHIPPED | OUTPATIENT
Start: 2018-05-09 | End: 2018-04-05

## 2018-04-05 RX ORDER — MONTELUKAST SODIUM 10 MG/1
10 TABLET ORAL AT BEDTIME
Qty: 90 TABLET | Refills: 1 | Status: SHIPPED | OUTPATIENT
Start: 2018-04-05 | End: 2018-10-02

## 2018-04-05 NOTE — PATIENT INSTRUCTIONS
Take medications as directed.  Cares and treatment   discussed   Follow up if problem or concern

## 2018-04-05 NOTE — MR AVS SNAPSHOT
After Visit Summary   4/5/2018    Orlando Holder    MRN: [id number]           Patient Information     Date Of Birth          1965        Visit Information        Provider Department      4/5/2018 10:00 AM Kay Ramirez MD [facility] [place]        Today's Diagnoses     Moderate persistent asthma, uncomplicated        Controlled substance agreement signed        Attention deficit disorder, unspecified hyperactivity presence          Care Instructions    Take medications as directed.  Cares and treatment   discussed   Follow up if problem or concern             Follow-ups after your visit        Follow-up notes from your care team     Return in about 6 months (around 10/5/2018).      Who to contact     If you have questions or need follow up information about today's clinic visit or your schedule please contact [facility] [place] directly at [phone].  Normal or non-critical lab and imaging results will be communicated to you by MyChart, letter or phone within 4 business days after the clinic has received the results. If you do not hear from us within 7 days, please contact the clinic through MyChart or phone. If you have a critical or abnormal lab result, we will notify you by phone as soon as possible.  Submit refill requests through [employer] or call your pharmacy and they will forward the refill request to us. Please allow 3 business days for your refill to be completed.          Additional Information About Your Visit        MyChart Information     [employer] gives you secure access to your electronic health record. If you see a primary care provider, you can also send messages to your care team and make appointments. If you have questions, please call your primary care clinic.  If you do not have a primary care provider, please call [phone] and they will assist you.        Care EveryWhere ID     This is your Care EveryWhere ID. This could be used by  "other organizations to access your [facility] medical records  [id number]        Your Vitals Were     Pulse Temperature Height Pulse Oximetry BMI (Body Mass Index)       63 97.3  F (36.3  C) (Tympanic) 5' 11.5\" (1.816 m) 97% 39.47 kg/m2        Blood Pressure from Last 3 Encounters:   04/05/18 120/74   11/13/17 118/70   08/15/17 133/87    Weight from Last 3 Encounters:   04/05/18 287 lb (130.2 kg)   11/13/17 276 lb (125.2 kg)   08/15/17 278 lb (126.1 kg)              Today, you had the following     No orders found for display         Today's Medication Changes          These changes are accurate as of 4/5/18 10:36 AM.  If you have any questions, ask your nurse or doctor.               These medicines have changed or have updated prescriptions.        Dose/Directions    * ADVAIR DISKUS 500-50 MCG/DOSE diskus inhaler   This may have changed:  Another medication with the same name was changed. Make sure you understand how and when to take each.   Used for:  Moderate persistent asthma without complication   Generic drug:  fluticasone-salmeterol   Changed by:  Kay Ramirez MD        INHALE 1 PUFF BY MOUTH TWICE DAILY. RINSE/GARGLE AFTER USE   Quantity:  1 Inhaler   Refills:  2       * fluticasone-salmeterol 500-50 MCG/DOSE diskus inhaler   Commonly known as:  ADVAIR DISKUS   This may have changed:  See the new instructions.   Used for:  Moderate persistent asthma, uncomplicated   Changed by:  Kay Ramirez MD        Dose:  1 puff   Inhale 1 puff into the lungs 2 times daily   Quantity:  180 Inhaler   Refills:  1       * amphetamine-dextroamphetamine 10 MG per tablet   Commonly known as:  ADDERALL   This may have changed:  Another medication with the same name was added. Make sure you understand how and when to take each.   Used for:  Controlled substance agreement signed, Attention deficit disorder, unspecified hyperactivity presence   Changed by:  Kay Ramirez MD        Start taking on:  " 4/9/2018   Take one tablet at 1 pm   Quantity:  30 tablet   Refills:  0       * amphetamine-dextroamphetamine 20 MG per 24 hr capsule   Commonly known as:  ADDERALL XR   This may have changed:  You were already taking a medication with the same name, and this prescription was added. Make sure you understand how and when to take each.   Used for:  Controlled substance agreement signed, Attention deficit disorder, unspecified hyperactivity presence   Changed by:  Kay Ramirez MD        Dose:  20 mg   Start taking on:  6/9/2018   Take 1 capsule (20 mg) by mouth daily   Quantity:  30 capsule   Refills:  0       montelukast 10 MG tablet   Commonly known as:  SINGULAIR   This may have changed:  See the new instructions.   Used for:  Moderate persistent asthma, uncomplicated   Changed by:  Kay Ramirez MD        Dose:  10 mg   Take 1 tablet (10 mg) by mouth At Bedtime   Quantity:  90 tablet   Refills:  1       * Notice:  This list has 4 medication(s) that are the same as other medications prescribed for you. Read the directions carefully, and ask your doctor or other care provider to review them with you.         Where to get your medicines      These medications were sent to [facility]/pharmacy #[id number] - [place], [place] - [address] [place] [address]     Phone:  [phone]     fluticasone-salmeterol 500-50 MCG/DOSE diskus inhaler    montelukast 10 MG tablet         Some of these will need a paper prescription and others can be bought over the counter.  Ask your nurse if you have questions.     Bring a paper prescription for each of these medications     amphetamine-dextroamphetamine 10 MG per tablet    amphetamine-dextroamphetamine 20 MG per 24 hr capsule                Primary Care Provider Office Phone # Fax #    Kay Ramirez [phone]        [address]  [place] [address]        Equal Access to Services     MARIUM GOODMAN AH: Dane skaggs  tim Tariq, waaxda luqadaha, qaybta kaalmada annie, bea amberin hayaaman kramerbrannon hickman lafriedaman brina. So [facility] [phone].    ATENCIÓN: Si habla reed, tiene a rudolph disposición servicios gratuitos de asistencia lingüística. Irene al [phone].    We comply with applicable federal civil rights laws and [place] laws. We do not discriminate on the basis of race, color, national origin, age, disability, sex, sexual orientation, or gender identity.            Thank you!     Thank you for choosing [facility] [place]  for your care. Our goal is always to provide you with excellent care. Hearing back from our patients is one way we can continue to improve our services. Please take a few minutes to complete the written survey that you may receive in the mail after your visit with us. Thank you!             Your Updated Medication List - Protect others around you: Learn how to safely use, store and throw away your medicines at www.disposemymeds.org.          This list is accurate as of 4/5/18 10:36 AM.  Always use your most recent med list.                   Brand Name Dispense Instructions for use Diagnosis    * ADVAIR DISKUS 500-50 MCG/DOSE diskus inhaler   Generic drug:  fluticasone-salmeterol     1 Inhaler    INHALE 1 PUFF BY MOUTH TWICE DAILY. RINSE/GARGLE AFTER USE    Moderate persistent asthma without complication       * fluticasone-salmeterol 500-50 MCG/DOSE diskus inhaler    ADVAIR DISKUS    180 Inhaler    Inhale 1 puff into the lungs 2 times daily    Moderate persistent asthma, uncomplicated       albuterol 108 (90 BASE) MCG/ACT Inhaler    PROAIR HFA    1 Inhaler    Inhale 1-2 puffs into the lungs 4 times daily as needed    Moderate persistent asthma, uncomplicated       * amphetamine-dextroamphetamine 10 MG per tablet   Start taking on:  4/9/2018    ADDERALL    30 tablet    Take one tablet at 1 pm    Controlled substance agreement signed, Attention deficit disorder, unspecified hyperactivity  presence       * amphetamine-dextroamphetamine 20 MG per 24 hr capsule   Start taking on:  6/9/2018    ADDERALL XR    30 capsule    Take 1 capsule (20 mg) by mouth daily    Controlled substance agreement signed, Attention deficit disorder, unspecified hyperactivity presence       BIFIDOBACTERIUM BIFIDUM           fish oil-omega-3 fatty acids 1000 MG capsule      Take 2 g by mouth daily.        fluticasone 50 MCG/ACT spray    FLONASE    3 Bottle    Spray 1-2 sprays into both nostrils daily    Seasonal allergic rhinitis due to other allergic trigger       GLUCOSAMINE 1500 COMPLEX PO      Take 1 tablet by mouth daily.        montelukast 10 MG tablet    SINGULAIR    90 tablet    Take 1 tablet (10 mg) by mouth At Bedtime    Moderate persistent asthma, uncomplicated       Multi-vitamin Tabs tablet   Generic drug:  multivitamin, therapeutic with minerals      1 tab q  day        sildenafil 100 MG tablet    VIAGRA    10 tablet    Take 1 tablet by mouth daily as needed for erectile dysfunction.    Erectile dysfunction       VITAMIN D PO      Take  by mouth. 5000 units        WAL-ITIN 10 MG tablet   Generic drug:  loratadine     30    1 TABLET DAILY        * Notice:  This list has 4 medication(s) that are the same as other medications prescribed for you. Read the directions carefully, and ask your doctor or other care provider to review them with you.

## 2018-04-05 NOTE — NURSING NOTE
"Chief Complaint   Patient presents with     Recheck Medication       Initial /74  Pulse 63  Temp 97.3  F (36.3  C) (Tympanic)  Ht 5' 11.5\" (1.816 m)  Wt 287 lb (130.2 kg)  SpO2 97%  BMI 39.47 kg/m2 Estimated body mass index is 39.47 kg/(m^2) as calculated from the following:    Height as of this encounter: 5' 11.5\" (1.816 m).    Weight as of this encounter: 287 lb (130.2 kg).  Medication Reconciliation: complete  "

## 2018-04-05 NOTE — PROGRESS NOTES
SUBJECTIVE:   Orlando Holder is a 53 year old male who presents to clinic today for the following health issues:      Medication Followup of Adderall for ADHD//asthma meds    Taking Medication as prescribed: yes    Side Effects:  None    Medication Helping Symptoms:  yes       PROBLEMS TO ADD ON...    ADD Follow-Up    Status since last visit: No change.  Mostly he takes 20 mg dose in the morning.  He is rarely takes additional 10 mg dose in the evening especially given he needs more done .  No problem taking the medication    Other associated symptoms:None    Complicating factors:     Significant life event: No     Current substance abuse: None    No flowsheet data found.  No flowsheet data found.    PHQ-9  English  PHQ-9   Any Language  BUDDY-7  Suicide Assessment Five-step Evaluation and Treatment (SAFE-T)  Asthma Follow-Up    Was ACT completed today?    Yes is well controlled.  Needs albuterol infrequently.   ACT Total Scores 4/5/2018   ACT TOTAL SCORE -   ASTHMA ER VISITS -   ASTHMA HOSPITALIZATIONS -   ACT TOTAL SCORE (Goal Greater than or Equal to 20) 24   In the past 12 months, how many times did you visit the emergency room for your asthma without being admitted to the hospital? 0   In the past 12 months, how many times were you hospitalized overnight because of your asthma? 0       Recent asthma triggers that patient is dealing with: None        Problem list and histories reviewed & adjusted, as indicated.  Additional history: as documented    Patient Active Problem List   Diagnosis     Nonspecific abnormal results of liver function study     Difficulty concentrating     Nevus     CARDIOVASCULAR SCREENING; LDL GOAL LESS THAN 160     Erectile dysfunction     Common wart     Achilles tendonitis     Anal fissure     Obesity     History of colonic polyps     ADD (attention deficit disorder)     Controlled substance agreement signed     Seasonal allergic rhinitis, unspecified allergic rhinitis trigger      "Moderate persistent asthma without complication     BMI 38.0-38.9,adult     Obesity, unspecified obesity severity, unspecified obesity type     Abnormal thyroid blood test     Elevated fasting glucose     Past Surgical History:   Procedure Laterality Date     C NONSPECIFIC PROCEDURE  2004    repair deviated septum     HC REPAIR INCISIONAL HERNIA,REDUCIBLE  1997    Hernia Repair,  inguinal, Unilateral x2 on rt.       Social History   Substance Use Topics     Smoking status: Former Smoker     Packs/day: 0.50     Years: 20.00     Types: Cigarettes     Quit date: 3/26/2001     Smokeless tobacco: Never Used     Alcohol use 0.0 oz/week     0 Standard drinks or equivalent per week     Family History   Problem Relation Age of Onset     Hypertension Paternal Grandmother      C.A.D. Paternal Grandmother      Arthritis Paternal Grandmother      DIABETES No family hx of      Prostate Cancer No family hx of      Cancer - colorectal No family hx of            Reviewed and updated as needed this visit by clinical staff  Tobacco  Allergies  Meds       Reviewed and updated as needed this visit by Provider         ROS:  Constitutional, HEENT, cardiovascular, pulmonary, GI, , musculoskeletal, neuro, skin, endocrine and psych systems are negative, except as otherwise noted.    OBJECTIVE:     /74  Pulse 63  Temp 97.3  F (36.3  C) (Tympanic)  Ht 5' 11.5\" (1.816 m)  Wt 287 lb (130.2 kg)  SpO2 97%  BMI 39.47 kg/m2  Body mass index is 39.47 kg/(m^2).  GENERAL: healthy, alert and no distress  NECK: no adenopathy, no asymmetry, masses, or scars and thyroid normal to palpation  RESP: lungs clear to auscultation - no rales, rhonchi or wheezes  CV: regular rate and rhythm, normal S1 S2, no S3 or S4, no murmur, click or rub, no peripheral edema and peripheral pulses strong  NEURO: Normal strength and tone, mentation intact and speech normal  PSYCH: mentation appears normal, affect normal/bright        ASSESSMENT/PLAN: "       (J45.40) Moderate persistent asthma, uncomplicated  Comment: stable   Plan: montelukast (SINGULAIR) 10 MG tablet,         fluticasone-salmeterol (ADVAIR DISKUS) 500-50         MCG/DOSE diskus inhaler            (Z79.899) Controlled substance agreement signed  Comment:   Plan: amphetamine-dextroamphetamine (ADDERALL) 10 MG         per tablet, amphetamine-dextroamphetamine         (ADDERALL XR) 20 MG per 24 hr capsule,         DISCONTINUED: amphetamine-dextroamphetamine         (ADDERALL XR) 20 MG per 24 hr capsule,         DISCONTINUED: amphetamine-dextroamphetamine         (ADDERALL XR) 20 MG per 24 hr capsule,         DISCONTINUED: amphetamine-dextroamphetamine         (ADDERALL XR) 20 MG per 24 hr capsule            (F98.8) Attention deficit disorder, unspecified hyperactivity presence  Comment:   Plan: amphetamine-dextroamphetamine (ADDERALL) 10 MG         per tablet, amphetamine-dextroamphetamine         (ADDERALL XR) 20 MG per 24 hr capsule,         DISCONTINUED: amphetamine-dextroamphetamine         (ADDERALL XR) 20 MG per 24 hr capsule,         DISCONTINUED: amphetamine-dextroamphetamine         (ADDERALL XR) 20 MG per 24 hr capsule,         DISCONTINUED: amphetamine-dextroamphetamine         (ADDERALL XR) 20 MG per 24 hr capsule         GAVE 3 MONTSH REFILL        doing  well on current med's.   Mostly he is taking the morning dose, need evening dose occasionally as needed.  Asthma is also well controlled  Follow-up up in 4-6 months.             Patient expressed understanding and agreement with treatment plan. All patient's questions were answered, will let me know if has more later.  Medications: Rx's: Reviewed the potential side effects/complications of medications prescribed.       Kay Ramirez MD  [facility]

## 2018-04-06 ASSESSMENT — ASTHMA QUESTIONNAIRES: ACT_TOTALSCORE: 24

## 2018-04-09 ENCOUNTER — TELEPHONE (OUTPATIENT)
Dept: FAMILY MEDICINE | Facility: CLINIC | Age: 53
End: 2018-04-09

## 2018-04-09 NOTE — TELEPHONE ENCOUNTER
Name of caller: Orlando  Relationship of Patient: Self    Reason for Call: Patient called today to say his Pharmacy ([facility]) told him to contact us and request a Prior Auth. He stated he would like a call back at every step of the process because this is different from what he has always been told. He would like a call when we submit the PA and then again when we receive the auth or not.     Best phone number to reach pt at is: [phone]  Ok to leave a message with medical info? Yes    Pharmacy preferred (if calling for a refill):  Clinic Pharmacy    Hattie Terrell  Patient Representative - [facility]

## 2018-04-09 NOTE — TELEPHONE ENCOUNTER
Prior Authorization Retail Medication Request    Medication/Dose: Amphetamine Salt ER 20 mg cp24  ICD code (if different than what is on RX):  F98.8  Previously Tried and Failed:  none  Rationale:  none    Insurance Name:  [facility] Choice Plus   Insurance ID:  [id number]       Pharmacy Information (if different than what is on RX)  Name:  [facility] [place]  Phone:  [phone]    Thank You  Ericka Ledesma CPhT  [facility]

## 2018-04-09 NOTE — TELEPHONE ENCOUNTER
Prior Authorization Retail Medication Request    Medication/Dose: Amphetamine Salts 10 mg tabs  ICD code (if different than what is on RX):  F98.8  Previously Tried and Failed:  none  Rationale:  none    Insurance Name:  [facility] Choice Plus  Insurance ID:  [id number]      Pharmacy Information (if different than what is on RX)  Name:  [facility] [place]  Phone:  [phone]    Thank You  Ericka Ledesma CPhT  [facility]

## 2018-04-12 NOTE — TELEPHONE ENCOUNTER
PA Initiation    Medication: Amphetamine Salt ER 20 mg cp24  Insurance Company: [employer] - Phone [phone] Fax [phone]  Pharmacy Filling the Rx: [facility] [place] - [address]  Filling Pharmacy Phone: [phone]  Filling Pharmacy Fax:    Start Date: 4/12/2018    THIS HAS BEEN SUBMITTED BY THE PRIOR-AUTHORIZATION TEAM. ANY QUESTIONS PLEASE CALL [phone]. THANK YOU

## 2018-04-13 NOTE — TELEPHONE ENCOUNTER
Prior Authorization Approval    Authorization Effective Date: 4/12/2018  Authorization Expiration Date: 4/12/2019  Medication: Amphetamine Salt ER 20 mg cp24 APPROVED   Approved Dose/Quantity:   Reference #:     Insurance Company: [employer] - Phone [phone] Fax [phone]  Expected CoPay: $10.00     CoPay Card Available:      Foundation Assistance Needed:    Which Pharmacy is filling the prescription (Not needed for infusion/clinic administered): [facility] [place] - [address]  Pharmacy Notified: Yes  Patient Notified: Yes

## 2018-04-18 NOTE — TELEPHONE ENCOUNTER
PA Initiation    Medication: Amphetamine Salts 10 mg tabs - INITIATED  Insurance Company: [employer] ([facility]) - Phone [phone] Fax [phone]  Pharmacy Filling the Rx: [id number] - [place], [place] - [address]  Filling Pharmacy Phone: [phone]  Filling Pharmacy Fax:    Start Date: 4/18/2018

## 2018-04-20 NOTE — TELEPHONE ENCOUNTER
MEDICATION APPEAL APPROVED    Medication: Amphetamine Salts 10 mg tabs - APPROVED  Authorization Effective Date: 4/18/2018  Authorization Expiration Date: 4/18/2019  Approved Dose/Quantity: 30 FOR 30 DAYS  Reference #: [id number]   Insurance Company:    Expected CoPay:       CoPay Card Available:      Foundation Assistance Needed:    Which Pharmacy is filling the prescription (Not needed for infusion/clinic administered): [facility] [place] - [address]

## 2018-06-08 ENCOUNTER — OFFICE VISIT (OUTPATIENT)
Dept: FAMILY MEDICINE | Facility: CLINIC | Age: 53
End: 2018-06-08
Payer: COMMERCIAL

## 2018-06-08 VITALS
BODY MASS INDEX: 37.76 KG/M2 | DIASTOLIC BLOOD PRESSURE: 80 MMHG | OXYGEN SATURATION: 98 % | WEIGHT: 278.8 LBS | TEMPERATURE: 97.5 F | HEIGHT: 72 IN | HEART RATE: 62 BPM | SYSTOLIC BLOOD PRESSURE: 123 MMHG

## 2018-06-08 DIAGNOSIS — J45.41 MODERATE PERSISTENT ASTHMA WITH ACUTE EXACERBATION: Primary | ICD-10-CM

## 2018-06-08 PROCEDURE — 99213 OFFICE O/P EST LOW 20 MIN: CPT | Performed by: PHYSICIAN ASSISTANT

## 2018-06-08 RX ORDER — PREDNISONE 20 MG/1
20 TABLET ORAL DAILY
Qty: 5 TABLET | Refills: 0 | Status: SHIPPED | OUTPATIENT
Start: 2018-06-08 | End: 2018-06-13

## 2018-06-08 NOTE — PROGRESS NOTES
SUBJECTIVE:   Orlando Holder is a 53 year old male who presents to clinic today for the following health issues:      Asthma Follow-Up    Was ACT completed today?    Yes    ACT Total Scores 4/5/2018   ACT TOTAL SCORE -   ASTHMA ER VISITS -   ASTHMA HOSPITALIZATIONS -   ACT TOTAL SCORE (Goal Greater than or Equal to 20) 24   In the past 12 months, how many times did you visit the emergency room for your asthma without being admitted to the hospital? 0   In the past 12 months, how many times were you hospitalized overnight because of your asthma? 0       Recent asthma triggers that patient is dealing with: weather/season changes        Amount of exercise or physical activity: 4-5 days/week for an average of 15-30 minutes    Problems taking medications regularly: No    Medication side effects: none    Diet: regular (no restrictions)      Orlando presents to the clinic with wheezing and cough that have been ongoing now x 2-3 weeks.  Symptoms began as a URI.  His nasal congestion, sore throat and chest congestion have improved but his wheezing has persisted.  Cough is intermittent and dry. He has been using Advair daily and needing albuterol TID over the past 1 week.      Problem list and histories reviewed & adjusted, as indicated.  Additional history: none    Patient Active Problem List   Diagnosis     Nonspecific abnormal results of liver function study     Difficulty concentrating     Nevus     CARDIOVASCULAR SCREENING; LDL GOAL LESS THAN 160     Erectile dysfunction     Common wart     Achilles tendonitis     Anal fissure     Obesity     History of colonic polyps     ADD (attention deficit disorder)     Controlled substance agreement signed     Seasonal allergic rhinitis, unspecified allergic rhinitis trigger     Moderate persistent asthma without complication     BMI 38.0-38.9,adult     Obesity, unspecified obesity severity, unspecified obesity type     Abnormal thyroid blood test     Elevated fasting  glucose     Past Surgical History:   Procedure Laterality Date     C NONSPECIFIC PROCEDURE  2004    repair deviated septum     HC REPAIR INCISIONAL HERNIA,REDUCIBLE  1997    Hernia Repair,  inguinal, Unilateral x2 on rt.       Social History   Substance Use Topics     Smoking status: Former Smoker     Packs/day: 0.50     Years: 20.00     Types: Cigarettes     Quit date: 3/26/2001     Smokeless tobacco: Never Used     Alcohol use 0.0 oz/week     0 Standard drinks or equivalent per week     Family History   Problem Relation Age of Onset     Hypertension Paternal Grandmother      C.A.D. Paternal Grandmother      Arthritis Paternal Grandmother      DIABETES No family hx of      Prostate Cancer No family hx of      Cancer - colorectal No family hx of          Current Outpatient Prescriptions   Medication Sig Dispense Refill     ADVAIR DISKUS 500-50 MCG/DOSE diskus inhaler INHALE 1 PUFF BY MOUTH TWICE DAILY. RINSE/GARGLE AFTER USE 1 Inhaler 2     albuterol (ALBUTEROL) 108 (90 BASE) MCG/ACT Inhaler Inhale 1-2 puffs into the lungs 4 times daily as needed 1 Inhaler 1     [START ON 6/9/2018] amphetamine-dextroamphetamine (ADDERALL XR) 20 MG per 24 hr capsule Take 1 capsule (20 mg) by mouth daily 30 capsule 0     amphetamine-dextroamphetamine (ADDERALL) 10 MG per tablet Take one tablet at 1 pm 30 tablet 0     BIFIDOBACTERIUM BIFIDUM        Cholecalciferol (VITAMIN D PO) Take  by mouth. 5000 units       fish oil-omega-3 fatty acids (FISH OIL) 1000 MG capsule Take 2 g by mouth daily.       fluticasone (FLONASE) 50 MCG/ACT spray Spray 1-2 sprays into both nostrils daily 3 Bottle 3     fluticasone-salmeterol (ADVAIR DISKUS) 500-50 MCG/DOSE diskus inhaler Inhale 1 puff into the lungs 2 times daily 180 Inhaler 1     Glucosamine-Chondroit-Vit C-Mn (GLUCOSAMINE 1500 COMPLEX PO) Take 1 tablet by mouth daily.       montelukast (SINGULAIR) 10 MG tablet Take 1 tablet (10 mg) by mouth At Bedtime 90 tablet 1     MULTI-VITAMIN OR TABS 1 tab  "q  day  0     predniSONE (DELTASONE) 20 MG tablet Take 1 tablet (20 mg) by mouth daily for 5 days 5 tablet 0     sildenafil (VIAGRA) 100 MG tablet Take 1 tablet by mouth daily as needed for erectile dysfunction. 10 tablet 12     WAL-ITIN 10 MG OR TABS 1 TABLET DAILY 30 0     Allergies   Allergen Reactions     No Known Drug Allergies        Reviewed and updated as needed this visit by clinical staff       Reviewed and updated as needed this visit by Provider         ROS:  Constitutional, HEENT, cardiovascular, pulmonary, gi and gu systems are negative, except as otherwise noted.    OBJECTIVE:     /80 (BP Location: Left arm, Cuff Size: Adult Large)  Pulse 62  Temp 97.5  F (36.4  C) (Tympanic)  Ht 5' 11.5\" (1.816 m)  Wt 278 lb 12.8 oz (126.5 kg)  SpO2 98%  BMI 38.34 kg/m2  Body mass index is 38.34 kg/(m^2).  GENERAL: healthy, alert and no distress  EYES: Eyes grossly normal to inspection, PERRL and conjunctivae and sclerae normal  HENT: ear canals and TM's normal, nose and mouth without ulcers or lesions  NECK: no adenopathy  RESP: lungs clear to auscultation - no rales, rhonchi or wheezes  CV: regular rate and rhythm, normal S1 S2, no S3 or S4, no murmur, click or rub    Diagnostic Test Results:  none     ASSESSMENT/PLAN:       1. Moderate persistent asthma with acute exacerbation  02 sats WNL on RA, moderate wheezing diffusely across lung fields bilaterally on exam.  Patient would benefit from steroid burst today for asthma exacerbation and he should use albuterol QID over the next 5 days.  He will return to clinic if symptoms continue or worsen      See Patient Instructions    Jacek Willoughby PA-C  [facility]    "

## 2018-06-08 NOTE — MR AVS SNAPSHOT
After Visit Summary   6/8/2018    Orlando Holder    MRN: [id number]           Patient Information     Date Of Birth          1965        Visit Information        Provider Department      6/8/2018 11:20 AM Jace[facility] Willoughby [facility]-C [facility]        Today's Diagnoses     Moderate persistent asthma, unspecified whether complicated    -  1       Follow-ups after your visit        Follow-up notes from your care team     Return in about 1 week (around 6/15/2018) for if new or perisstent symptoms.      Who to contact     If you have questions or need follow up information about today's clinic visit or your schedule please contact [facility] directly at [phone].  Normal or non-critical lab and imaging results will be communicated to you by Tri-Medicshart, letter or phone within 4 business days after the clinic has received the results. If you do not hear from us within 7 days, please contact the clinic through Tri-Medicshart or phone. If you have a critical or abnormal lab result, we will notify you by phone as soon as possible.  Submit refill requests through [employer] or call your pharmacy and they will forward the refill request to us. Please allow 3 business days for your refill to be completed.          Additional Information About Your Visit        MyChart Information     [employer] gives you secure access to your electronic health record. If you see a primary care provider, you can also send messages to your care team and make appointments. If you have questions, please call your primary care clinic.  If you do not have a primary care provider, please call [phone] and they will assist you.        Care EveryWhere ID     This is your Care EveryWhere ID. This could be used by other organizations to access your [facility] medical records  [id number]        Your Vitals Were     Pulse Temperature Height Pulse Oximetry BMI (Body Mass Index)       62 97.5  F (36.4  " C) (Tympanic) 5' 11.5\" (1.816 m) 98% 38.34 kg/m2        Blood Pressure from Last 3 Encounters:   06/08/18 123/80   04/05/18 120/74   11/13/17 118/70    Weight from Last 3 Encounters:   06/08/18 278 lb 12.8 oz (126.5 kg)   04/05/18 287 lb (130.2 kg)   11/13/17 276 lb (125.2 kg)              Today, you had the following     No orders found for display         Today's Medication Changes          These changes are accurate as of 6/8/18 11:43 AM.  If you have any questions, ask your nurse or doctor.               Start taking these medicines.        Dose/Directions    predniSONE 20 MG tablet   Commonly known as:  DELTASONE   Used for:  Moderate persistent asthma, unspecified whether complicated   Started by:  Jacek Willoughby PA-C        Dose:  20 mg   Take 1 tablet (20 mg) by mouth daily for 5 days   Quantity:  5 tablet   Refills:  0            Where to get your medicines      These medications were sent to [facility]/pharmacy #[id number] - [place], [place] - [address]  [address] [place] [address] [place] [address]     Phone:  [phone]     predniSONE 20 MG tablet                Primary Care Provider Office Phone # Fax #    Kay Ramirez [phone]        [address]  [place] [address]        Equal Access to Services     [facility]: Hadii giles dumont hadasho Soomaali, waaxda luqadaha, qaybta kaalmada adeegyada, bea gonsalves. So [facility] [phone].    ATENCIÓN: Si habla español, tiene a rudolph disposición servicios gratuitos de asistencia lingüística. Llame al [phone].    We comply with applicable federal civil rights laws and [place] laws. We do not discriminate on the basis of race, color, national origin, age, disability, sex, sexual orientation, or gender identity.            Thank you!     Thank you for choosing [facility] [place]  for your care. Our goal is always to provide you with excellent care. Hearing back from our patients is " one way we can continue to improve our services. Please take a few minutes to complete the written survey that you may receive in the mail after your visit with us. Thank you!             Your Updated Medication List - Protect others around you: Learn how to safely use, store and throw away your medicines at www.disposemymeds.org.          This list is accurate as of 6/8/18 11:43 AM.  Always use your most recent med list.                   Brand Name Dispense Instructions for use Diagnosis    * ADVAIR DISKUS 500-50 MCG/DOSE diskus inhaler   Generic drug:  fluticasone-salmeterol     1 Inhaler    INHALE 1 PUFF BY MOUTH TWICE DAILY. RINSE/GARGLE AFTER USE    Moderate persistent asthma without complication       * fluticasone-salmeterol 500-50 MCG/DOSE diskus inhaler    ADVAIR DISKUS    180 Inhaler    Inhale 1 puff into the lungs 2 times daily    Moderate persistent asthma, uncomplicated       albuterol 108 (90 Base) MCG/ACT Inhaler    PROAIR HFA    1 Inhaler    Inhale 1-2 puffs into the lungs 4 times daily as needed    Moderate persistent asthma, uncomplicated       * amphetamine-dextroamphetamine 10 MG per tablet    ADDERALL    30 tablet    Take one tablet at 1 pm    Controlled substance agreement signed, Attention deficit disorder, unspecified hyperactivity presence       * amphetamine-dextroamphetamine 20 MG per 24 hr capsule   Start taking on:  6/9/2018    ADDERALL XR    30 capsule    Take 1 capsule (20 mg) by mouth daily    Controlled substance agreement signed, Attention deficit disorder, unspecified hyperactivity presence       BIFIDOBACTERIUM BIFIDUM           fish oil-omega-3 fatty acids 1000 MG capsule      Take 2 g by mouth daily.        fluticasone 50 MCG/ACT spray    FLONASE    3 Bottle    Spray 1-2 sprays into both nostrils daily    Seasonal allergic rhinitis due to other allergic trigger       GLUCOSAMINE 1500 COMPLEX PO      Take 1 tablet by mouth daily.        montelukast 10 MG tablet    SINGULAIR     90 tablet    Take 1 tablet (10 mg) by mouth At Bedtime    Moderate persistent asthma, uncomplicated       Multi-vitamin Tabs tablet   Generic drug:  multivitamin, therapeutic with minerals      1 tab q  day        predniSONE 20 MG tablet    DELTASONE    5 tablet    Take 1 tablet (20 mg) by mouth daily for 5 days    Moderate persistent asthma, unspecified whether complicated       sildenafil 100 MG tablet    VIAGRA    10 tablet    Take 1 tablet by mouth daily as needed for erectile dysfunction.    Erectile dysfunction       VITAMIN D PO      Take  by mouth. 5000 units        WAL-ITIN 10 MG tablet   Generic drug:  loratadine     30    1 TABLET DAILY        * Notice:  This list has 4 medication(s) that are the same as other medications prescribed for you. Read the directions carefully, and ask your doctor or other care provider to review them with you.

## 2018-07-24 DIAGNOSIS — F98.8 ATTENTION DEFICIT DISORDER, UNSPECIFIED HYPERACTIVITY PRESENCE: ICD-10-CM

## 2018-07-24 DIAGNOSIS — Z79.899 CONTROLLED SUBSTANCE AGREEMENT SIGNED: ICD-10-CM

## 2018-07-24 NOTE — TELEPHONE ENCOUNTER
Adderall XR 20mg      Last Written Prescription Date:  6/9/18  Last Fill Quantity: 30,   # refills: 0  Last Office Visit: 6/8/18  Future Office visit:       Routing refill request to provider for review/approval because:  Drug not on the [facility], [facility] or [facility] refill protocol or controlled substance    Adderall 10mg      Last Written Prescription Date:  4/9/18  Last Fill Quantity: 30,   # refills: 0  Last Office Visit: 6/8/18  Future Office visit:       Routing refill request to provider for review/approval because:  Drug not on the [facility], [facility] or [facility] refill protocol or controlled substance    Charis Abdalla CMA

## 2018-07-24 NOTE — TELEPHONE ENCOUNTER
[place] ([place] reviewed:  reviewed- no concerns    [place]:  [email or web address]    Mary Rosario RN   [facility] - Triage

## 2018-07-24 NOTE — TELEPHONE ENCOUNTER
Please call Orlando at [phone] when prescription has been processed.   Ok to leave message: Yes    Refill request received via: PT wife Ree called to request. Would like it but Thursday. Would like it walked to  pharmacy  Patient requesting refill for: amphetamine-dextroamphetamine (ADDERALL XR) 20 MG per 24 hr capsule, amphetamine-dextroamphetamine (ADDERALL) 10 MG per tablet  Last Office Visit: 04/05/18  Last Refill (see below):    amphetamine-dextroamphetamine (ADDERALL) 10 MG per tablet 30 tablet 0 4/9/2018  No   Sig: Take one tablet at 1 pm   Class: Local Print   Order: [id number]       amphetamine-dextroamphetamine (ADDERALL XR) 20 MG per 24 hr capsule 30 capsule 0 6/9/2018  No   Sig - Route: Take 1 capsule (20 mg) by mouth daily - Oral   Class: Local Print   Order: [id number]       Hattie Terrell  Patient Representative - [facility]

## 2018-07-25 RX ORDER — DEXTROAMPHETAMINE SACCHARATE, AMPHETAMINE ASPARTATE MONOHYDRATE, DEXTROAMPHETAMINE SULFATE AND AMPHETAMINE SULFATE 5; 5; 5; 5 MG/1; MG/1; MG/1; MG/1
20 CAPSULE, EXTENDED RELEASE ORAL DAILY
Qty: 30 CAPSULE | Refills: 0 | Status: SHIPPED | OUTPATIENT
Start: 2018-07-25 | End: 2018-09-24

## 2018-07-25 RX ORDER — DEXTROAMPHETAMINE SACCHARATE, AMPHETAMINE ASPARTATE, DEXTROAMPHETAMINE SULFATE AND AMPHETAMINE SULFATE 2.5; 2.5; 2.5; 2.5 MG/1; MG/1; MG/1; MG/1
TABLET ORAL
Qty: 30 TABLET | Refills: 0 | Status: SHIPPED | OUTPATIENT
Start: 2018-07-25 | End: 2018-09-24

## 2018-08-14 ENCOUNTER — TELEPHONE (OUTPATIENT)
Dept: FAMILY MEDICINE | Facility: CLINIC | Age: 53
End: 2018-08-14

## 2018-08-14 NOTE — TELEPHONE ENCOUNTER
Wife Rosalie (consent in chart) calling states pt has been wheezing for the past couple of days.  Using flonase, advair, singulair, and proair with little relief.  Pt did not schedule an appt but wife feels he needs to be seen.    No appts available in EP.  Did schedule with Nori Kilgore in  tomorrow at 3:20pm.    Emma Weiss RN  EP Triage'

## 2018-08-17 ENCOUNTER — OFFICE VISIT (OUTPATIENT)
Dept: FAMILY MEDICINE | Facility: CLINIC | Age: 53
End: 2018-08-17
Payer: COMMERCIAL

## 2018-08-17 VITALS
WEIGHT: 289 LBS | HEART RATE: 67 BPM | BODY MASS INDEX: 39.14 KG/M2 | HEIGHT: 72 IN | DIASTOLIC BLOOD PRESSURE: 88 MMHG | SYSTOLIC BLOOD PRESSURE: 135 MMHG | TEMPERATURE: 96.2 F | OXYGEN SATURATION: 97 %

## 2018-08-17 DIAGNOSIS — J45.901 MODERATE ASTHMA WITH EXACERBATION, UNSPECIFIED WHETHER PERSISTENT: Primary | ICD-10-CM

## 2018-08-17 DIAGNOSIS — E66.01 MORBID OBESITY (H): ICD-10-CM

## 2018-08-17 PROCEDURE — 94640 AIRWAY INHALATION TREATMENT: CPT | Performed by: NURSE PRACTITIONER

## 2018-08-17 PROCEDURE — 99214 OFFICE O/P EST MOD 30 MIN: CPT | Mod: 25 | Performed by: NURSE PRACTITIONER

## 2018-08-17 RX ORDER — PREDNISONE 20 MG/1
TABLET ORAL
Qty: 20 TABLET | Refills: 0 | Status: SHIPPED | OUTPATIENT
Start: 2018-08-17 | End: 2019-09-26

## 2018-08-17 RX ORDER — AZITHROMYCIN 250 MG/1
TABLET, FILM COATED ORAL
Qty: 6 TABLET | Refills: 0 | Status: SHIPPED | OUTPATIENT
Start: 2018-08-17 | End: 2018-11-14

## 2018-08-17 RX ORDER — ALBUTEROL SULFATE 90 UG/1
2 AEROSOL, METERED RESPIRATORY (INHALATION) EVERY 6 HOURS PRN
Qty: 1 INHALER | Refills: 1 | Status: SHIPPED | OUTPATIENT
Start: 2018-08-17 | End: 2019-05-08

## 2018-08-17 NOTE — MR AVS SNAPSHOT
After Visit Summary   8/17/2018    Orlando oHlder    MRN: [id number]           Patient Information     Date Of Birth          1965        Visit Information        Provider Department      8/17/2018 1:30 PM Michelle Tee APRN CNP [facility]        Today's Diagnoses     Moderate asthma with exacerbation, unspecified whether persistent    -  1    Morbid obesity (H)           Follow-ups after your visit        Follow-up notes from your care team     Return if symptoms worsen or fail to improve.      Who to contact     If you have questions or need follow up information about today's clinic visit or your schedule please contact [facility] directly at [phone].  Normal or non-critical lab and imaging results will be communicated to you by [email or web address], letter or phone within 4 business days after the clinic has received the results. If you do not hear from us within 7 days, please contact the clinic through [email or web address] or phone. If you have a critical or abnormal lab result, we will notify you by phone as soon as possible.  Submit refill requests through MEMSIC or call your pharmacy and they will forward the refill request to us. Please allow 3 business days for your refill to be completed.          Additional Information About Your Visit        MyChart Information     MEMSIC gives you secure access to your electronic health record. If you see a primary care provider, you can also send messages to your care team and make appointments. If you have questions, please call your primary care clinic.  If you do not have a primary care provider, please call [phone] and they will assist you.        Care EveryWhere ID     This is your Care EveryWhere ID. This could be used by other organizations to access your [facility] medical records  [id number]        Your Vitals Were     Pulse Temperature Height Pulse Oximetry BMI (Body Mass Index)       67 96.2  F (35.7  C) (Tympanic)  "5' 11.5\" (1.816 m) 97% 39.75 kg/m2        Blood Pressure from Last 3 Encounters:   08/17/18 135/88   06/08/18 123/80   04/05/18 120/74    Weight from Last 3 Encounters:   08/17/18 289 lb (131.1 kg)   06/08/18 278 lb 12.8 oz (126.5 kg)   04/05/18 287 lb (130.2 kg)              We Performed the Following     INHALATION/NEBULIZER TREATMENT, INITIAL          Today's Medication Changes          These changes are accurate as of 8/17/18  3:41 PM.  If you have any questions, ask your nurse or doctor.               Start taking these medicines.        Dose/Directions    azithromycin 250 MG tablet   Commonly known as:  ZITHROMAX   Used for:  Moderate asthma with exacerbation, unspecified whether persistent   Started by:  Michelle Tee APRN CNP        Two tablets first day, then one tablet daily for four days.   Quantity:  6 tablet   Refills:  0       predniSONE 20 MG tablet   Commonly known as:  DELTASONE   Used for:  Moderate asthma with exacerbation, unspecified whether persistent   Started by:  Michelle Tee APRN CNP        Take 3 tabs (60 mg) by mouth daily x 3 days, 2 tabs (40 mg) daily x 3 days, 1 tab (20 mg) daily x 3 days, then 1/2 tab (10 mg) x 3 days.   Quantity:  20 tablet   Refills:  0         These medicines have changed or have updated prescriptions.        Dose/Directions    * albuterol 108 (90 Base) MCG/ACT inhaler   Commonly known as:  PROAIR HFA   This may have changed:  Another medication with the same name was added. Make sure you understand how and when to take each.   Used for:  Moderate persistent asthma, uncomplicated   Changed by:  Michelle Tee APRN CNP        Dose:  1-2 puff   Inhale 1-2 puffs into the lungs 4 times daily as needed   Quantity:  1 Inhaler   Refills:  1       * albuterol 108 (90 Base) MCG/ACT inhaler   Commonly known as:  PROAIR HFA/PROVENTIL HFA/VENTOLIN HFA   This may have changed:  You were already taking a medication with the same name, and this prescription was " added. Make sure you understand how and when to take each.   Used for:  Moderate asthma with exacerbation, unspecified whether persistent   Changed by:  Michelle Tee APRN CNP        Dose:  2 puff   Inhale 2 puffs into the lungs every 6 hours as needed for shortness of breath / dyspnea or wheezing   Quantity:  1 Inhaler   Refills:  1       * Notice:  This list has 2 medication(s) that are the same as other medications prescribed for you. Read the directions carefully, and ask your doctor or other care provider to review them with you.         Where to get your medicines      These medications were sent to [facility]/pharmacy #[id number] - [place], [place] - [address]  [address] [address]     Phone:  [phone]     albuterol 108 (90 Base) MCG/ACT inhaler    azithromycin 250 MG tablet    predniSONE 20 MG tablet                Primary Care Provider Office Phone # Fax #    Kay Ramirez [phone]       [address]  [place] [address]        Equal Access to Services     [facility]: Hadii aad ku hadasho Soomaali, waaxda luqadaha, qaybta kaalmada adeegyada, waxay bhargav ferreira . So [facility] [phone].    ATENCIÓN: Si habla español, tiene a rudolph disposición servicios gratuitos de asistencia lingüística. Llame al [phone].    We comply with applicable federal civil rights laws and [place] laws. We do not discriminate on the basis of race, color, national origin, age, disability, sex, sexual orientation, or gender identity.            Thank you!     Thank you for choosing [facility]  for your care. Our goal is always to provide you with excellent care. Hearing back from our patients is one way we can continue to improve our services. Please take a few minutes to complete the written survey that you may receive in the mail after your visit with us. Thank you!             Your Updated Medication List - Protect others around  you: Learn how to safely use, store and throw away your medicines at www.disposemymeds.org.          This list is accurate as of 8/17/18  3:41 PM.  Always use your most recent med list.                   Brand Name Dispense Instructions for use Diagnosis    * ADVAIR DISKUS 500-50 MCG/DOSE diskus inhaler   Generic drug:  fluticasone-salmeterol     1 Inhaler    INHALE 1 PUFF BY MOUTH TWICE DAILY. RINSE/GARGLE AFTER USE    Moderate persistent asthma without complication       * fluticasone-salmeterol 500-50 MCG/DOSE diskus inhaler    ADVAIR DISKUS    180 Inhaler    Inhale 1 puff into the lungs 2 times daily    Moderate persistent asthma, uncomplicated       * albuterol 108 (90 Base) MCG/ACT inhaler    PROAIR HFA    1 Inhaler    Inhale 1-2 puffs into the lungs 4 times daily as needed    Moderate persistent asthma, uncomplicated       * albuterol 108 (90 Base) MCG/ACT inhaler    PROAIR HFA/PROVENTIL HFA/VENTOLIN HFA    1 Inhaler    Inhale 2 puffs into the lungs every 6 hours as needed for shortness of breath / dyspnea or wheezing    Moderate asthma with exacerbation, unspecified whether persistent       * amphetamine-dextroamphetamine 20 MG per 24 hr capsule    ADDERALL XR    30 capsule    Take 1 capsule (20 mg) by mouth daily    Controlled substance agreement signed, Attention deficit disorder, unspecified hyperactivity presence       * amphetamine-dextroamphetamine 10 MG per tablet    ADDERALL    30 tablet    Take one tablet at 1 pm    Controlled substance agreement signed, Attention deficit disorder, unspecified hyperactivity presence       azithromycin 250 MG tablet    ZITHROMAX    6 tablet    Two tablets first day, then one tablet daily for four days.    Moderate asthma with exacerbation, unspecified whether persistent       BIFIDOBACTERIUM BIFIDUM           fish oil-omega-3 fatty acids 1000 MG capsule      Take 2 g by mouth daily.        fluticasone 50 MCG/ACT spray    FLONASE    3 Bottle    Spray 1-2 sprays into  both nostrils daily    Seasonal allergic rhinitis due to other allergic trigger       GLUCOSAMINE 1500 COMPLEX PO      Take 1 tablet by mouth daily.        montelukast 10 MG tablet    SINGULAIR    90 tablet    Take 1 tablet (10 mg) by mouth At Bedtime    Moderate persistent asthma, uncomplicated       Multi-vitamin Tabs tablet   Generic drug:  multivitamin, therapeutic with minerals      1 tab q  day        predniSONE 20 MG tablet    DELTASONE    20 tablet    Take 3 tabs (60 mg) by mouth daily x 3 days, 2 tabs (40 mg) daily x 3 days, 1 tab (20 mg) daily x 3 days, then 1/2 tab (10 mg) x 3 days.    Moderate asthma with exacerbation, unspecified whether persistent       sildenafil 100 MG tablet    VIAGRA    10 tablet    Take 1 tablet by mouth daily as needed for erectile dysfunction.    Erectile dysfunction       VITAMIN D PO      Take  by mouth. 5000 units        WAL-ITIN 10 MG tablet   Generic drug:  loratadine     30    1 TABLET DAILY        * Notice:  This list has 6 medication(s) that are the same as other medications prescribed for you. Read the directions carefully, and ask your doctor or other care provider to review them with you.

## 2018-08-17 NOTE — PROGRESS NOTES
SUBJECTIVE:   Orlando Holder is a 53 year old male who presents to clinic today for the following health issues:      Asthma Follow-Up    Was ACT completed today?  No      Respiratory symptoms:   Cough: Yes-     Wheezing: Yes-     Shortness of breath: No    Use of short- acting(rescue) inhaler: 4 times daily    Taking controlled (daily) meds as prescribed: Yes    ER/UC visits or hospital admissions since last visit: none     Recent asthma triggers that patient is dealing with: dust mites, pollens and humidity       Amount of exercise or physical activity: 4-5 days/week for an average of 30-45 minutes    Problems taking medications regularly: No    Medication side effects: none    Diet: regular (no restrictions)    Currently using albuterol 2 puffs about every 4 hours, takes singulair, uses advair 500 twice a day, takes claritin and uses flonase    Quit smoking 2001    Morbid obesity and working on weight loss    Sleep apnea uses CPAP    Problem list and histories reviewed & adjusted, as indicated.  Additional history: as documented    Patient Active Problem List   Diagnosis     Nonspecific abnormal results of liver function study     Difficulty concentrating     Nevus     CARDIOVASCULAR SCREENING; LDL GOAL LESS THAN 160     Erectile dysfunction     Common wart     Achilles tendonitis     Anal fissure     Obesity     History of colonic polyps     ADD (attention deficit disorder)     Controlled substance agreement signed     Seasonal allergic rhinitis, unspecified allergic rhinitis trigger     Moderate persistent asthma without complication     BMI 38.0-38.9,adult     Obesity, unspecified obesity severity, unspecified obesity type     Abnormal thyroid blood test     Elevated fasting glucose     Morbid obesity (H)     Past Surgical History:   Procedure Laterality Date     C NONSPECIFIC PROCEDURE  2004    repair deviated septum     HC REPAIR INCISIONAL HERNIA,REDUCIBLE  1997    Hernia Repair,  inguinal,  Unilateral x2 on rt.       Social History   Substance Use Topics     Smoking status: Former Smoker     Packs/day: 0.50     Years: 20.00     Types: Cigarettes     Quit date: 3/26/2001     Smokeless tobacco: Never Used     Alcohol use 0.0 oz/week     0 Standard drinks or equivalent per week     Family History   Problem Relation Age of Onset     Hypertension Paternal Grandmother      C.A.D. Paternal Grandmother      Arthritis Paternal Grandmother      Diabetes No family hx of      Prostate Cancer No family hx of      Cancer - colorectal No family hx of          Current Outpatient Prescriptions   Medication Sig Dispense Refill     ADVAIR DISKUS 500-50 MCG/DOSE diskus inhaler INHALE 1 PUFF BY MOUTH TWICE DAILY. RINSE/GARGLE AFTER USE 1 Inhaler 2     albuterol (ALBUTEROL) 108 (90 BASE) MCG/ACT Inhaler Inhale 1-2 puffs into the lungs 4 times daily as needed 1 Inhaler 1     albuterol (PROAIR HFA/PROVENTIL HFA/VENTOLIN HFA) 108 (90 Base) MCG/ACT inhaler Inhale 2 puffs into the lungs every 6 hours as needed for shortness of breath / dyspnea or wheezing 1 Inhaler 1     amphetamine-dextroamphetamine (ADDERALL XR) 20 MG per 24 hr capsule Take 1 capsule (20 mg) by mouth daily 30 capsule 0     amphetamine-dextroamphetamine (ADDERALL) 10 MG per tablet Take one tablet at 1 pm 30 tablet 0     azithromycin (ZITHROMAX) 250 MG tablet Two tablets first day, then one tablet daily for four days. 6 tablet 0     BIFIDOBACTERIUM BIFIDUM        Cholecalciferol (VITAMIN D PO) Take  by mouth. 5000 units       fish oil-omega-3 fatty acids (FISH OIL) 1000 MG capsule Take 2 g by mouth daily.       fluticasone (FLONASE) 50 MCG/ACT spray Spray 1-2 sprays into both nostrils daily 3 Bottle 3     fluticasone-salmeterol (ADVAIR DISKUS) 500-50 MCG/DOSE diskus inhaler Inhale 1 puff into the lungs 2 times daily 180 Inhaler 1     Glucosamine-Chondroit-Vit C-Mn (GLUCOSAMINE 1500 COMPLEX PO) Take 1 tablet by mouth daily.       montelukast (SINGULAIR) 10 MG  "tablet Take 1 tablet (10 mg) by mouth At Bedtime 90 tablet 1     MULTI-VITAMIN OR TABS 1 tab q  day  0     predniSONE (DELTASONE) 20 MG tablet Take 3 tabs (60 mg) by mouth daily x 3 days, 2 tabs (40 mg) daily x 3 days, 1 tab (20 mg) daily x 3 days, then 1/2 tab (10 mg) x 3 days. 20 tablet 0     sildenafil (VIAGRA) 100 MG tablet Take 1 tablet by mouth daily as needed for erectile dysfunction. 10 tablet 12     WAL-ITIN 10 MG OR TABS 1 TABLET DAILY 30 0     Allergies   Allergen Reactions     No Known Drug Allergies        Reviewed and updated as needed this visit by clinical staff       Reviewed and updated as needed this visit by Provider         ROS:  Constitutional, HEENT, cardiovascular, pulmonary, gi and gu systems are negative, except as otherwise noted.    OBJECTIVE:     /88 (BP Location: Left arm, Cuff Size: Adult Large)  Pulse 67  Temp 96.2  F (35.7  C) (Tympanic)  Ht 5' 11.5\" (1.816 m)  Wt 289 lb (131.1 kg)  SpO2 97%  BMI 39.75 kg/m2  Body mass index is 39.75 kg/(m^2).  GENERAL: healthy, alert and no distress  EYES: Eyes grossly normal to inspection, PERRL and conjunctivae and sclerae normal  HENT: ear canals and TM's normal, nose and mouth without ulcers or lesions  NECK: no adenopathy, no asymmetry, masses, or scars and thyroid normal to palpation  RESP: lungs; rhonchi and wheezing throughout; post SVN with albuterol improved air movement in lower lobes and upper lobes fewer wheezes   CV: regular rate and rhythm, normal S1 S2, no S3 or S4, no murmur, click or rub, no peripheral edema and peripheral pulses strong    Diagnostic Test Results:  none     ASSESSMENT/PLAN:       ICD-10-CM    1. Moderate asthma with exacerbation, unspecified whether persistent J45.901 predniSONE (DELTASONE) 20 MG tablet     azithromycin (ZITHROMAX) 250 MG tablet     INHALATION/NEBULIZER TREATMENT, INITIAL     albuterol (PROAIR HFA/PROVENTIL HFA/VENTOLIN HFA) 108 (90 Base) MCG/ACT inhaler     He will add the prednisone " taper and the zpak to his current regimen  Follow up with PCP in 7-10 days;  if not doing much better consider home LEILANI Rod CNP  [facility]

## 2018-08-17 NOTE — NURSING NOTE
The following nebulizer treatment was given:     MEDICATION: Albuterol Sulfate 2.5 mg  : [employer]  LOT #: [id number]  EXPIRATION DATE:  04/30/19  NDC # 7947-4696-49     Nebulizer Start Time:  1:50p.m.  Nebulizer Stop Time:  1:56 p.m.  See Vital Signs Flowsheet    Susan Bailey MA

## 2018-09-18 ENCOUNTER — TELEPHONE (OUTPATIENT)
Dept: FAMILY MEDICINE | Facility: CLINIC | Age: 53
End: 2018-09-18

## 2018-09-18 DIAGNOSIS — Z79.899 CONTROLLED SUBSTANCE AGREEMENT SIGNED: ICD-10-CM

## 2018-09-18 DIAGNOSIS — F98.8 ATTENTION DEFICIT DISORDER, UNSPECIFIED HYPERACTIVITY PRESENCE: ICD-10-CM

## 2018-09-18 RX ORDER — DEXTROAMPHETAMINE SACCHARATE, AMPHETAMINE ASPARTATE, DEXTROAMPHETAMINE SULFATE AND AMPHETAMINE SULFATE 2.5; 2.5; 2.5; 2.5 MG/1; MG/1; MG/1; MG/1
TABLET ORAL
Qty: 30 TABLET | Refills: 0 | Status: CANCELLED | OUTPATIENT
Start: 2018-09-18

## 2018-09-18 NOTE — TELEPHONE ENCOUNTER
Adderall XR 20mg      Last Written Prescription Date:  7/25/18  Last Fill Quantity: 30,   # refills: 0  Last Office Visit: [place]  Future Office visit:       Routing refill request to provider for review/approval because:  Drug not on the [facility], [facility] or [facility] refill protocol or controlled substance    Adderall 10mg      Last Written Prescription Date:  7/25/18  Last Fill Quantity: 30,   # refills: 0  Last Office Visit: [place]  Future Office visit:       Routing refill request to provider for review/approval because:  Drug not on the [facility], [facility] or [facility] refill protocol or controlled substance

## 2018-09-19 NOTE — TELEPHONE ENCOUNTER
[place] reviewed:  not reviewed/not due - last done on 7/24/18    [place]  [email or web address]    Emma Weiss RN  EP Triage

## 2018-09-20 NOTE — TELEPHONE ENCOUNTER
Patient requesting update on his medication request. He would like to  his RX before the weekend because the clinic is not open on Saturdays. Please advise.  [phone] (home)   Ok to leave detailed message: yes  Thank you  Dhara Stubbs

## 2018-09-20 NOTE — TELEPHONE ENCOUNTER
S/w pt and advised refill of medications were sent to Dr. Ramirez last night.  We will call when rxs are ready for .  Advised of clinic 72 hour med refill policy also.    Pt states understanding.    Emma Weiss RN  EP Triage

## 2018-09-21 NOTE — TELEPHONE ENCOUNTER
Patient calling to check the status of request  He is out of his medications and wants to know if a covering Provider can do them and then have them walked to the Clinic pharmacy  Please call patient once done or if can be done [phone]  Silvia KRAUSE

## 2018-09-24 ENCOUNTER — TELEPHONE (OUTPATIENT)
Dept: FAMILY MEDICINE | Facility: CLINIC | Age: 53
End: 2018-09-24

## 2018-09-24 DIAGNOSIS — F98.8 ATTENTION DEFICIT DISORDER, UNSPECIFIED HYPERACTIVITY PRESENCE: ICD-10-CM

## 2018-09-24 DIAGNOSIS — Z79.899 CONTROLLED SUBSTANCE AGREEMENT SIGNED: ICD-10-CM

## 2018-09-24 NOTE — TELEPHONE ENCOUNTER
amphetamine-dextroamphetamine (ADDERALL XR) 20 MG per 24 hr capsule    Last Written Prescription Date:  7-  Last Fill Quantity: 30 capsule,   # refills: 0  Last Office Visit: 6-8-2018  Future Office visit:       Routing refill request to provider for review/approval because:  Drug not on the [facility], [facility] or [facility] refill protocol or controlled substance          amphetamine-dextroamphetamine (ADDERALL) 10 MG per tablet    Last Written Prescription Date:  7-  Last Fill Quantity: 30 tablet,   # refills: 0  Last Office Visit: 6-8-2018  Future Office visit:       Routing refill request to provider for review/approval because:  Drug not on the [facility], [facility] or [facility] refill protocol or controlled substance

## 2018-09-25 RX ORDER — DEXTROAMPHETAMINE SACCHARATE, AMPHETAMINE ASPARTATE MONOHYDRATE, DEXTROAMPHETAMINE SULFATE AND AMPHETAMINE SULFATE 5; 5; 5; 5 MG/1; MG/1; MG/1; MG/1
20 CAPSULE, EXTENDED RELEASE ORAL DAILY
Qty: 30 CAPSULE | Refills: 0 | Status: SHIPPED | OUTPATIENT
Start: 2018-09-25 | End: 2019-05-08

## 2018-09-25 RX ORDER — DEXTROAMPHETAMINE SACCHARATE, AMPHETAMINE ASPARTATE MONOHYDRATE, DEXTROAMPHETAMINE SULFATE AND AMPHETAMINE SULFATE 5; 5; 5; 5 MG/1; MG/1; MG/1; MG/1
20 CAPSULE, EXTENDED RELEASE ORAL DAILY
Qty: 30 CAPSULE | Refills: 0 | Status: SHIPPED | OUTPATIENT
Start: 2018-09-25 | End: 2018-10-31

## 2018-09-25 RX ORDER — DEXTROAMPHETAMINE SACCHARATE, AMPHETAMINE ASPARTATE, DEXTROAMPHETAMINE SULFATE AND AMPHETAMINE SULFATE 2.5; 2.5; 2.5; 2.5 MG/1; MG/1; MG/1; MG/1
TABLET ORAL
Qty: 30 TABLET | Refills: 0 | Status: SHIPPED | OUTPATIENT
Start: 2018-09-25 | End: 2019-05-08 | Stop reason: DRUGHIGH

## 2018-09-25 NOTE — TELEPHONE ENCOUNTER
Spouse is calling to check status of Adderall refill. Advised it was approved today.    Loretta Charles  Patient Representative

## 2018-09-25 NOTE — TELEPHONE ENCOUNTER
Notified patient his rx for adderall for 20mg and 10 mg was ready for  at the . Pt is in agreement.

## 2018-09-25 NOTE — TELEPHONE ENCOUNTER
Patient calling re: his adderall. Told patient the RX request was just received today and the Dr. Ramirez is in clinic seeing patients. Provider will respond as soon as she can. He insisted a call back from the clinic re: updates on his medication.  [phone] (home)   Ok to leave detailed message: yes  Thank you  Dhara Stubbs

## 2018-10-02 DIAGNOSIS — J45.40 MODERATE PERSISTENT ASTHMA, UNCOMPLICATED: ICD-10-CM

## 2018-10-02 RX ORDER — MONTELUKAST SODIUM 10 MG/1
TABLET ORAL
Qty: 90 TABLET | Refills: 2 | Status: SHIPPED | OUTPATIENT
Start: 2018-10-02 | End: 2018-11-14

## 2018-10-02 NOTE — TELEPHONE ENCOUNTER
Prescription approved per [facility], [facility] or [facility] refill protocol.  Arline Farias RN - Triage  [facility]

## 2018-10-02 NOTE — TELEPHONE ENCOUNTER
"Requested Prescriptions   Pending Prescriptions Disp Refills     montelukast (SINGULAIR) 10 MG tablet [Pharmacy Med Name: MONTELUKAST SOD 10 MG TABLET] 90 tablet 1    Last Written Prescription Date:  4/5/18  Last Fill Quantity: 90,  # refills: 1   Last office visit: 8/17/2018 with prescribing provider:  NO   Future Office Visit:     Sig: TAKE 1 TABLET (10 MG) BY MOUTH AT BEDTIME    Leukotriene Inhibitors Protocol Passed    10/2/2018  2:14 AM       Passed - Patient is age 12 or older    If patient is under 16, ok to refill using age based dosing.          Passed - Asthma control assessment score within normal limits in last 6 months    Please review ACT score.          Passed - Recent (6 mo) or future (30 days) visit within the authorizing provider's specialty    Patient had office visit in the last 6 months or has a visit in the next 30 days with authorizing provider or within the authorizing provider's specialty.  See \"Patient Info\" tab in inbasket, or \"Choose Columns\" in Meds & Orders section of the refill encounter.              "

## 2018-10-31 DIAGNOSIS — Z79.899 CONTROLLED SUBSTANCE AGREEMENT SIGNED: ICD-10-CM

## 2018-10-31 DIAGNOSIS — F98.8 ATTENTION DEFICIT DISORDER, UNSPECIFIED HYPERACTIVITY PRESENCE: ICD-10-CM

## 2018-10-31 RX ORDER — DEXTROAMPHETAMINE SACCHARATE, AMPHETAMINE ASPARTATE MONOHYDRATE, DEXTROAMPHETAMINE SULFATE AND AMPHETAMINE SULFATE 5; 5; 5; 5 MG/1; MG/1; MG/1; MG/1
20 CAPSULE, EXTENDED RELEASE ORAL DAILY
Qty: 30 CAPSULE | Refills: 0 | Status: SHIPPED | OUTPATIENT
Start: 2018-10-31 | End: 2019-05-08

## 2018-10-31 NOTE — TELEPHONE ENCOUNTER
amphetamine-dextroamphetamine (ADDERALL XR) 20 MG per 24 hr capsule      Last Written Prescription Date:  9/25/2018  Last Fill Quantity: 30,   # refills: 0  Last Office Visit: 8/17/2018  Future Office visit:   NA    Routing refill request to provider for review/approval because:  Drug not on the [facility] or [facility] refill protocol or controlled substance    Requested Prescriptions   Pending Prescriptions Disp Refills     amphetamine-dextroamphetamine (ADDERALL XR) 20 MG per 24 hr capsule 30 capsule 0     Sig: Take 1 capsule (20 mg) by mouth daily    There is no refill protocol information for this order

## 2018-10-31 NOTE — TELEPHONE ENCOUNTER
[place] reviewed:  reviewed- no concerns on 07/24/18    [place]  [email or web address]

## 2018-10-31 NOTE — TELEPHONE ENCOUNTER
Reason for Call:  Other prescription    Detailed comments: Pt called this morning and would like a refill on his adderall. Please refill this and give pt a call once this is ready., Thank you.    Phone Number Patient can be reached at: Home number on file [phone] (home)    Best Time:     Can we leave a detailed message on this number? YES    Call taken on 10/31/2018 at 9:47 AM by Inge Fuller

## 2018-11-14 ENCOUNTER — OFFICE VISIT (OUTPATIENT)
Dept: FAMILY MEDICINE | Facility: CLINIC | Age: 53
End: 2018-11-14
Payer: COMMERCIAL

## 2018-11-14 VITALS
OXYGEN SATURATION: 97 % | HEART RATE: 66 BPM | TEMPERATURE: 97.5 F | WEIGHT: 288 LBS | BODY MASS INDEX: 39.01 KG/M2 | HEIGHT: 72 IN | SYSTOLIC BLOOD PRESSURE: 127 MMHG | DIASTOLIC BLOOD PRESSURE: 79 MMHG

## 2018-11-14 DIAGNOSIS — J45.40 MODERATE PERSISTENT ASTHMA, UNCOMPLICATED: ICD-10-CM

## 2018-11-14 DIAGNOSIS — Z79.899 CONTROLLED SUBSTANCE AGREEMENT SIGNED: ICD-10-CM

## 2018-11-14 DIAGNOSIS — J45.40 MODERATE PERSISTENT ASTHMA WITHOUT COMPLICATION: ICD-10-CM

## 2018-11-14 DIAGNOSIS — F98.8 ATTENTION DEFICIT DISORDER, UNSPECIFIED HYPERACTIVITY PRESENCE: Primary | ICD-10-CM

## 2018-11-14 PROCEDURE — 99214 OFFICE O/P EST MOD 30 MIN: CPT | Performed by: FAMILY MEDICINE

## 2018-11-14 RX ORDER — DEXTROAMPHETAMINE SACCHARATE, AMPHETAMINE ASPARTATE MONOHYDRATE, DEXTROAMPHETAMINE SULFATE AND AMPHETAMINE SULFATE 5; 5; 5; 5 MG/1; MG/1; MG/1; MG/1
20 CAPSULE, EXTENDED RELEASE ORAL DAILY
Qty: 30 CAPSULE | Refills: 0 | Status: SHIPPED | OUTPATIENT
Start: 2019-01-30 | End: 2019-09-26

## 2018-11-14 RX ORDER — DEXTROAMPHETAMINE SACCHARATE, AMPHETAMINE ASPARTATE MONOHYDRATE, DEXTROAMPHETAMINE SULFATE AND AMPHETAMINE SULFATE 5; 5; 5; 5 MG/1; MG/1; MG/1; MG/1
20 CAPSULE, EXTENDED RELEASE ORAL DAILY
Qty: 30 CAPSULE | Refills: 0 | Status: SHIPPED | OUTPATIENT
Start: 2018-11-30 | End: 2019-09-26

## 2018-11-14 RX ORDER — DEXTROAMPHETAMINE SACCHARATE, AMPHETAMINE ASPARTATE, DEXTROAMPHETAMINE SULFATE AND AMPHETAMINE SULFATE 2.5; 2.5; 2.5; 2.5 MG/1; MG/1; MG/1; MG/1
10 TABLET ORAL DAILY
Qty: 30 TABLET | Refills: 0 | Status: SHIPPED | OUTPATIENT
Start: 2018-11-30 | End: 2019-09-26

## 2018-11-14 RX ORDER — MONTELUKAST SODIUM 10 MG/1
10 TABLET ORAL AT BEDTIME
Qty: 90 TABLET | Refills: 1 | Status: SHIPPED | OUTPATIENT
Start: 2018-11-14 | End: 2019-05-13

## 2018-11-14 RX ORDER — DEXTROAMPHETAMINE SACCHARATE, AMPHETAMINE ASPARTATE, DEXTROAMPHETAMINE SULFATE AND AMPHETAMINE SULFATE 2.5; 2.5; 2.5; 2.5 MG/1; MG/1; MG/1; MG/1
10 TABLET ORAL 2 TIMES DAILY
Qty: 60 TABLET | Refills: 0 | Status: SHIPPED | OUTPATIENT
Start: 2018-12-30 | End: 2019-09-26

## 2018-11-14 RX ORDER — DEXTROAMPHETAMINE SACCHARATE, AMPHETAMINE ASPARTATE, DEXTROAMPHETAMINE SULFATE AND AMPHETAMINE SULFATE 2.5; 2.5; 2.5; 2.5 MG/1; MG/1; MG/1; MG/1
10 TABLET ORAL 2 TIMES DAILY
Qty: 60 TABLET | Refills: 0 | Status: SHIPPED | OUTPATIENT
Start: 2019-01-30 | End: 2019-09-26

## 2018-11-14 RX ORDER — DEXTROAMPHETAMINE SACCHARATE, AMPHETAMINE ASPARTATE MONOHYDRATE, DEXTROAMPHETAMINE SULFATE AND AMPHETAMINE SULFATE 5; 5; 5; 5 MG/1; MG/1; MG/1; MG/1
20 CAPSULE, EXTENDED RELEASE ORAL DAILY
Qty: 30 CAPSULE | Refills: 0 | Status: SHIPPED | OUTPATIENT
Start: 2018-12-30 | End: 2019-09-26

## 2018-11-14 NOTE — MR AVS SNAPSHOT
After Visit Summary   11/14/2018    Orlando Holder    MRN: [id number]           Patient Information     Date Of Birth          1965        Visit Information        Provider Department      11/14/2018 8:20 AM Kay Ramirez MD [facility] [place]        Today's Diagnoses     Controlled substance agreement signed        Attention deficit disorder, unspecified hyperactivity presence        Moderate persistent asthma without complication        Moderate persistent asthma, uncomplicated          Care Instructions    Take medications as directed.  Treatment  And  cares discussed   Follow up if problem or concern             Follow-ups after your visit        Follow-up notes from your care team     Return in about 1 week (around 11/21/2018), or sooner if needed , for Physical Exam.      Who to contact     If you have questions or need follow up information about today's clinic visit or your schedule please contact [facility] [place] directly at [phone].  Normal or non-critical lab and imaging results will be communicated to you by MyChart, letter or phone within 4 business days after the clinic has received the results. If you do not hear from us within 7 days, please contact the clinic through [employer] or phone. If you have a critical or abnormal lab result, we will notify you by phone as soon as possible.  Submit refill requests through [employer] or call your pharmacy and they will forward the refill request to us. Please allow 3 business days for your refill to be completed.          Additional Information About Your Visit        MyChart Information     [employer] gives you secure access to your electronic health record. If you see a primary care provider, you can also send messages to your care team and make appointments. If you have questions, please call your primary care clinic.  If you do not have a primary care provider, please call [phone] and they will  "assist you.        Care EveryWhere ID     This is your Care EveryWhere ID. This could be used by other organizations to access your [place] medical records  [id number]        Your Vitals Were     Pulse Temperature Height Pulse Oximetry BMI (Body Mass Index)       66 97.5  F (36.4  C) (Tympanic) 5' 11.5\" (1.816 m) 97% 39.61 kg/m2        Blood Pressure from Last 3 Encounters:   11/14/18 127/79   08/17/18 135/88   06/08/18 123/80    Weight from Last 3 Encounters:   11/14/18 288 lb (130.6 kg)   08/17/18 289 lb (131.1 kg)   06/08/18 278 lb 12.8 oz (126.5 kg)              Today, you had the following     No orders found for display         Today's Medication Changes          These changes are accurate as of 11/14/18  8:58 AM.  If you have any questions, ask your nurse or doctor.               These medicines have changed or have updated prescriptions.        Dose/Directions    * amphetamine-dextroamphetamine 10 MG per tablet   Commonly known as:  ADDERALL   This may have changed:  Another medication with the same name was added. Make sure you understand how and when to take each.   Used for:  Controlled substance agreement signed, Attention deficit disorder, unspecified hyperactivity presence   Changed by:  Kay Ramirez MD        Take one tablet at 1 pm   Quantity:  30 tablet   Refills:  0       * amphetamine-dextroamphetamine 20 MG per 24 hr capsule   Commonly known as:  ADDERALL XR   This may have changed:  Another medication with the same name was added. Make sure you understand how and when to take each.   Used for:  Controlled substance agreement signed, Attention deficit disorder, unspecified hyperactivity presence   Changed by:  Kay Ramirez MD        Dose:  20 mg   Take 1 capsule (20 mg) by mouth daily   Quantity:  30 capsule   Refills:  0       * amphetamine-dextroamphetamine 20 MG per 24 hr capsule   Commonly known as:  ADDERALL XR   This may have changed:  Another medication with the same " name was added. Make sure you understand how and when to take each.   Used for:  Controlled substance agreement signed, Attention deficit disorder, unspecified hyperactivity presence   Changed by:  Kay Ramirez MD        Dose:  20 mg   Take 1 capsule (20 mg) by mouth daily   Quantity:  30 capsule   Refills:  0       * amphetamine-dextroamphetamine 20 MG per 24 hr capsule   Commonly known as:  ADDERALL XR   This may have changed:  You were already taking a medication with the same name, and this prescription was added. Make sure you understand how and when to take each.   Used for:  Controlled substance agreement signed, Attention deficit disorder, unspecified hyperactivity presence   Changed by:  Kay Ramirez MD        Dose:  20 mg   Start taking on:  11/30/2018   Take 1 capsule (20 mg) by mouth daily   Quantity:  30 capsule   Refills:  0       * amphetamine-dextroamphetamine 10 MG per tablet   Commonly known as:  ADDERALL   This may have changed:  You were already taking a medication with the same name, and this prescription was added. Make sure you understand how and when to take each.   Used for:  Controlled substance agreement signed, Attention deficit disorder, unspecified hyperactivity presence   Changed by:  Kay Ramirez MD        Dose:  10 mg   Start taking on:  11/30/2018   Take 1 tablet (10 mg) by mouth daily   Quantity:  30 tablet   Refills:  0       * amphetamine-dextroamphetamine 20 MG per 24 hr capsule   Commonly known as:  ADDERALL XR   This may have changed:  You were already taking a medication with the same name, and this prescription was added. Make sure you understand how and when to take each.   Used for:  Controlled substance agreement signed, Attention deficit disorder, unspecified hyperactivity presence   Changed by:  Kay Ramirez MD        Dose:  20 mg   Start taking on:  12/30/2018   Take 1 capsule (20 mg) by mouth daily   Quantity:  30 capsule   Refills:   0       * amphetamine-dextroamphetamine 10 MG per tablet   Commonly known as:  ADDERALL   This may have changed:  You were already taking a medication with the same name, and this prescription was added. Make sure you understand how and when to take each.   Used for:  Controlled substance agreement signed, Attention deficit disorder, unspecified hyperactivity presence   Changed by:  Kay Ramirez MD        Dose:  10 mg   Start taking on:  12/30/2018   Take 1 tablet (10 mg) by mouth 2 times daily   Quantity:  60 tablet   Refills:  0       * amphetamine-dextroamphetamine 20 MG per 24 hr capsule   Commonly known as:  ADDERALL XR   This may have changed:  You were already taking a medication with the same name, and this prescription was added. Make sure you understand how and when to take each.   Used for:  Controlled substance agreement signed, Attention deficit disorder, unspecified hyperactivity presence   Changed by:  Kay Ramirez MD        Dose:  20 mg   Start taking on:  1/30/2019   Take 1 capsule (20 mg) by mouth daily   Quantity:  30 capsule   Refills:  0       * amphetamine-dextroamphetamine 10 MG per tablet   Commonly known as:  ADDERALL   This may have changed:  You were already taking a medication with the same name, and this prescription was added. Make sure you understand how and when to take each.   Used for:  Controlled substance agreement signed, Attention deficit disorder, unspecified hyperactivity presence   Changed by:  Kay Ramirez MD        Dose:  10 mg   Start taking on:  1/30/2019   Take 1 tablet (10 mg) by mouth 2 times daily   Quantity:  60 tablet   Refills:  0       * fluticasone-salmeterol 500-50 MCG/DOSE diskus inhaler   Commonly known as:  ADVAIR DISKUS   This may have changed:  Another medication with the same name was changed. Make sure you understand how and when to take each.   Used for:  Moderate persistent asthma, uncomplicated   Changed by:  Kay Ramirez  MD Miguel        Dose:  1 puff   Inhale 1 puff into the lungs 2 times daily   Quantity:  180 Inhaler   Refills:  1       * fluticasone-salmeterol 500-50 MCG/DOSE diskus inhaler   Commonly known as:  ADVAIR DISKUS   This may have changed:  See the new instructions.   Used for:  Moderate persistent asthma without complication   Changed by:  Kay Ramirez MD        Dose:  1 puff   Inhale 1 puff into the lungs 2 times daily   Quantity:  1 Inhaler   Refills:  4       * Notice:  This list has 11 medication(s) that are the same as other medications prescribed for you. Read the directions carefully, and ask your doctor or other care provider to review them with you.         Where to get your medicines      These medications were sent to [facility]/pharmacy #[id number] - [place], [place] - [address] [place] [facility] [address]     Phone:  [phone]     fluticasone-salmeterol 500-50 MCG/DOSE diskus inhaler    montelukast 10 MG tablet         Some of these will need a paper prescription and others can be bought over the counter.  Ask your nurse if you have questions.     Bring a paper prescription for each of these medications     amphetamine-dextroamphetamine 10 MG per tablet    amphetamine-dextroamphetamine 10 MG per tablet    amphetamine-dextroamphetamine 10 MG per tablet    amphetamine-dextroamphetamine 20 MG per 24 hr capsule    amphetamine-dextroamphetamine 20 MG per 24 hr capsule    amphetamine-dextroamphetamine 20 MG per 24 hr capsule                Primary Care Provider Office Phone # Fax #    Kay Ramirez [phone]       [address]  [place] [address]        Equal Access to Services     [facility] [place]: Hadii aad tim hadasho Sosolomon, waaxda luqadaha, qaybta kaalmada adebrannonyavenkatesh, bea gonsalves. So [facility] [phone].    ATENCIÓN: Si habla español, tiene a rudolph disposición servicios gratuitos de asistencia lingüística. Llame al  [phone].    We comply with applicable federal civil rights laws and [place] laws. We do not discriminate on the basis of race, color, national origin, age, disability, sex, sexual orientation, or gender identity.            Thank you!     Thank you for choosing [facility]] [place]  for your care. Our goal is always to provide you with excellent care. Hearing back from our patients is one way we can continue to improve our services. Please take a few minutes to complete the written survey that you may receive in the mail after your visit with us. Thank you!             Your Updated Medication List - Protect others around you: Learn how to safely use, store and throw away your medicines at www.disposemymeds.org.          This list is accurate as of 11/14/18  8:58 AM.  Always use your most recent med list.                   Brand Name Dispense Instructions for use Diagnosis    * albuterol 108 (90 Base) MCG/ACT inhaler    PROAIR HFA    1 Inhaler    Inhale 1-2 puffs into the lungs 4 times daily as needed    Moderate persistent asthma, uncomplicated       * albuterol 108 (90 Base) MCG/ACT inhaler    PROAIR HFA/PROVENTIL HFA/VENTOLIN HFA    1 Inhaler    Inhale 2 puffs into the lungs every 6 hours as needed for shortness of breath / dyspnea or wheezing    Moderate asthma with exacerbation, unspecified whether persistent       * amphetamine-dextroamphetamine 10 MG per tablet    ADDERALL    30 tablet    Take one tablet at 1 pm    Controlled substance agreement signed, Attention deficit disorder, unspecified hyperactivity presence       * amphetamine-dextroamphetamine 20 MG per 24 hr capsule    ADDERALL XR    30 capsule    Take 1 capsule (20 mg) by mouth daily    Controlled substance agreement signed, Attention deficit disorder, unspecified hyperactivity presence       * amphetamine-dextroamphetamine 20 MG per 24 hr capsule    ADDERALL XR    30 capsule    Take 1 capsule (20 mg) by mouth daily    Controlled  substance agreement signed, Attention deficit disorder, unspecified hyperactivity presence       * amphetamine-dextroamphetamine 20 MG per 24 hr capsule   Start taking on:  11/30/2018    ADDERALL XR    30 capsule    Take 1 capsule (20 mg) by mouth daily    Controlled substance agreement signed, Attention deficit disorder, unspecified hyperactivity presence       * amphetamine-dextroamphetamine 10 MG per tablet   Start taking on:  11/30/2018    ADDERALL    30 tablet    Take 1 tablet (10 mg) by mouth daily    Controlled substance agreement signed, Attention deficit disorder, unspecified hyperactivity presence       * amphetamine-dextroamphetamine 20 MG per 24 hr capsule   Start taking on:  12/30/2018    ADDERALL XR    30 capsule    Take 1 capsule (20 mg) by mouth daily    Controlled substance agreement signed, Attention deficit disorder, unspecified hyperactivity presence       * amphetamine-dextroamphetamine 10 MG per tablet   Start taking on:  12/30/2018    ADDERALL    60 tablet    Take 1 tablet (10 mg) by mouth 2 times daily    Controlled substance agreement signed, Attention deficit disorder, unspecified hyperactivity presence       * amphetamine-dextroamphetamine 20 MG per 24 hr capsule   Start taking on:  1/30/2019    ADDERALL XR    30 capsule    Take 1 capsule (20 mg) by mouth daily    Controlled substance agreement signed, Attention deficit disorder, unspecified hyperactivity presence       * amphetamine-dextroamphetamine 10 MG per tablet   Start taking on:  1/30/2019    ADDERALL    60 tablet    Take 1 tablet (10 mg) by mouth 2 times daily    Controlled substance agreement signed, Attention deficit disorder, unspecified hyperactivity presence       BIFIDOBACTERIUM BIFIDUM           fish oil-omega-3 fatty acids 1000 MG capsule      Take 2 g by mouth daily.        fluticasone 50 MCG/ACT spray    FLONASE    3 Bottle    Spray 1-2 sprays into both nostrils daily    Seasonal allergic rhinitis due to other allergic  trigger       * fluticasone-salmeterol 500-50 MCG/DOSE diskus inhaler    ADVAIR DISKUS    180 Inhaler    Inhale 1 puff into the lungs 2 times daily    Moderate persistent asthma, uncomplicated       * fluticasone-salmeterol 500-50 MCG/DOSE diskus inhaler    ADVAIR DISKUS    1 Inhaler    Inhale 1 puff into the lungs 2 times daily    Moderate persistent asthma without complication       GLUCOSAMINE 1500 COMPLEX PO      Take 1 tablet by mouth daily.        montelukast 10 MG tablet    SINGULAIR    90 tablet    Take 1 tablet (10 mg) by mouth At Bedtime    Moderate persistent asthma, uncomplicated       Multi-vitamin Tabs tablet   Generic drug:  multivitamin, therapeutic with minerals      1 tab q  day        predniSONE 20 MG tablet    DELTASONE    20 tablet    Take 3 tabs (60 mg) by mouth daily x 3 days, 2 tabs (40 mg) daily x 3 days, 1 tab (20 mg) daily x 3 days, then 1/2 tab (10 mg) x 3 days.    Moderate asthma with exacerbation, unspecified whether persistent       sildenafil 100 MG tablet    VIAGRA    10 tablet    Take 1 tablet by mouth daily as needed for erectile dysfunction.    Erectile dysfunction       VITAMIN D PO      Take  by mouth. 5000 units        WAL-ITIN 10 MG tablet   Generic drug:  loratadine     30    1 TABLET DAILY        * Notice:  This list has 13 medication(s) that are the same as other medications prescribed for you. Read the directions carefully, and ask your doctor or other care provider to review them with you.

## 2018-11-14 NOTE — PROGRESS NOTES
SUBJECTIVE:   Orlando Holder is a 53 year old male who presents to clinic today for the following health issues:      Medication Followup of Adderrall / asthma med's     Taking Medication as prescribed: yes    Side Effects:  None    Medication Helping Symptoms:  yes       PROBLEMS TO ADD ON...  ADD Follow-Up    Status since last visit: No change, doing well on current dose , wish to continue on med's and need refill. Wants 3 months  script     Other associated symptoms:None    Complicating factors: none     Significant life event: No     Current substance abuse: None    No flowsheet data found.  No flowsheet data found.    PHQ-9  English  PHQ-9   Any Language  BUDDY-7  Suicide Assessment Five-step Evaluation and Treatment (SAFE-T)  Asthma Follow-Up    Was ACT completed today?    Yes  . Doing well on current asthma/ allergy med's rarely needs inhaler , need refill on med's   ACT Total Scores 11/14/2018   ACT TOTAL SCORE -   ASTHMA ER VISITS -   ASTHMA HOSPITALIZATIONS -   ACT TOTAL SCORE (Goal Greater than or Equal to 20) 24   In the past 12 months, how many times did you visit the emergency room for your asthma without being admitted to the hospital? 0   In the past 12 months, how many times were you hospitalized overnight because of your asthma? 0       Recent asthma triggers that patient is dealing with: None        Problem list and histories reviewed & adjusted, as indicated.  Additional history: as documented    Patient Active Problem List   Diagnosis     Nonspecific abnormal results of liver function study     Difficulty concentrating     Nevus     CARDIOVASCULAR SCREENING; LDL GOAL LESS THAN 160     Erectile dysfunction     Common wart     Achilles tendonitis     Anal fissure     Obesity     History of colonic polyps     ADD (attention deficit disorder)     Controlled substance agreement signed     Seasonal allergic rhinitis, unspecified allergic rhinitis trigger     Moderate persistent asthma without  "complication     BMI 38.0-38.9,adult     Obesity, unspecified obesity severity, unspecified obesity type     Abnormal thyroid blood test     Elevated fasting glucose     Morbid obesity (H)     Past Surgical History:   Procedure Laterality Date     C NONSPECIFIC PROCEDURE  2004    repair deviated septum     HC REPAIR INCISIONAL HERNIA,REDUCIBLE  1997    Hernia Repair,  inguinal, Unilateral x2 on rt.       Social History   Substance Use Topics     Smoking status: Former Smoker     Packs/day: 0.50     Years: 20.00     Types: Cigarettes     Quit date: 3/26/2001     Smokeless tobacco: Never Used     Alcohol use 0.0 oz/week     0 Standard drinks or equivalent per week     Family History   Problem Relation Age of Onset     Hypertension Paternal Grandmother      C.A.D. Paternal Grandmother      Arthritis Paternal Grandmother      Diabetes No family hx of      Prostate Cancer No family hx of      Cancer - colorectal No family hx of            Reviewed and updated as needed this visit by clinical staff  Tobacco  Allergies  Meds       Reviewed and updated as needed this visit by Provider         ROS:  Constitutional, HEENT, cardiovascular, pulmonary, GI, , musculoskeletal, neuro, skin, endocrine and psych systems are negative, except as otherwise noted.    OBJECTIVE:     /79  Pulse 66  Temp 97.5  F (36.4  C) (Tympanic)  Ht 5' 11.5\" (1.816 m)  Wt 288 lb (130.6 kg)  SpO2 97%  BMI 39.61 kg/m2  Body mass index is 39.61 kg/(m^2).  GENERAL: healthy, alert and no distress  NECK: no adenopathy, no asymmetry, masses, or scars and thyroid normal to palpation  RESP: lungs clear to auscultation - no rales, rhonchi or wheezes  CV: regular rate and rhythm, normal S1 S2, no S3 or S4, no murmur, click or rub, no peripheral edema and peripheral pulses strong  ABDOMEN: soft, nontender, no hepatosplenomegaly, no masses and bowel sounds normal  NEURO: Normal strength and tone, mentation intact and speech normal  PSYCH: mentation " appears normal, affect normal/bright        ASSESSMENT/PLAN:       (F98.8) Attention deficit disorder, unspecified hyperactivity presence  Comment:   Plan: amphetamine-dextroamphetamine (ADDERALL XR) 20         MG per 24 hr capsule,         amphetamine-dextroamphetamine (ADDERALL XR) 20         MG per 24 hr capsule,         amphetamine-dextroamphetamine (ADDERALL XR) 20         MG per 24 hr capsule,         amphetamine-dextroamphetamine (ADDERALL) 10 MG         per tablet, amphetamine-dextroamphetamine         (ADDERALL) 10 MG per tablet,         amphetamine-dextroamphetamine (ADDERALL) 10 MG         per tablet              (Z79.899) Controlled substance agreement signed  Comment:   Plan: amphetamine-dextroamphetamine (ADDERALL XR) 20         MG per 24 hr capsule,         amphetamine-dextroamphetamine (ADDERALL XR) 20         MG per 24 hr capsule,         amphetamine-dextroamphetamine (ADDERALL XR) 20         MG per 24 hr capsule,         amphetamine-dextroamphetamine (ADDERALL) 10 MG         per tablet, amphetamine-dextroamphetamine         (ADDERALL) 10 MG per tablet,         amphetamine-dextroamphetamine (ADDERALL) 10 MG         per tablet                  (J45.40) Moderate persistent asthma without complication  Comment:   Plan: fluticasone-salmeterol (ADVAIR DISKUS) 500-50         MCG/DOSE diskus inhaler            (J45.40) Moderate persistent asthma, uncomplicated  Comment:   Plan: montelukast (SINGULAIR) 10 MG tablet                 refill given..Cares and  treatment discussed follow up in 3-4 months, sooner  if problem   Patient expressed understanding and agreement with treatment plan. All patient's questions were answered, will let me know if has more later.  Medications: Rx's: Reviewed the potential side effects/complications of medications prescribed.       Kay Ramirez MD  [facility] [place]

## 2018-11-14 NOTE — PATIENT INSTRUCTIONS
Take medications as directed.  Treatment  And  cares discussed   Follow up if problem or concern

## 2018-11-15 ASSESSMENT — ASTHMA QUESTIONNAIRES: ACT_TOTALSCORE: 24

## 2018-12-10 DIAGNOSIS — J45.40 MODERATE PERSISTENT ASTHMA, UNCOMPLICATED: ICD-10-CM

## 2018-12-10 NOTE — TELEPHONE ENCOUNTER
"Requested Prescriptions   Pending Prescriptions Disp Refills     ADVAIR DISKUS 500-50 MCG/DOSE inhaler [Pharmacy Med Name: ADVAIR 500-50 DISKUS]  1     Sig: INHALE 1 PUFF INTO THE LUNGS 2 TIMES DAILY    Inhaled Steroids Protocol Passed - 12/10/2018  2:25 AM       Passed - Patient is age 12 or older       Passed - Asthma control assessment score within normal limits in last 6 months    ACT Total Scores 4/5/2018 6/8/2018 11/14/2018   ACT TOTAL SCORE - - -   ASTHMA ER VISITS - - -   ASTHMA HOSPITALIZATIONS - - -   ACT TOTAL SCORE (Goal Greater than or Equal to 20) 24 23 24   In the past 12 months, how many times did you visit the emergency room for your asthma without being admitted to the hospital? 0 - 0   In the past 12 months, how many times were you hospitalized overnight because of your asthma? 0 - 0          Passed - Recent (6 mo) or future (30 days) visit within the authorizing provider's specialty    Patient had office visit in the last 6 months or has a visit in the next 30 days with authorizing provider or within the authorizing provider's specialty.  See \"Patient Info\" tab in inbasket, or \"Choose Columns\" in Meds & Orders section of the refill encounter.       Last Written Prescription Date:  4/5/2018  Last Fill Quantity: 180 inhaler,  # refills: 1   Last office visit: 11/14/2018 with prescribing provider:  BO Ramirez  Future Office Visit:   Next 5 appointments (look out 90 days)    Dec 18, 2018  4:00 PM CST  PHYSICAL with Kay Ramirez MD  [facility] [place] ([address] [id number] [phone]                "

## 2018-12-18 ENCOUNTER — OFFICE VISIT (OUTPATIENT)
Dept: FAMILY MEDICINE | Facility: CLINIC | Age: 53
End: 2018-12-18
Payer: COMMERCIAL

## 2018-12-18 ENCOUNTER — ANCILLARY PROCEDURE (OUTPATIENT)
Dept: GENERAL RADIOLOGY | Facility: CLINIC | Age: 53
End: 2018-12-18
Attending: FAMILY MEDICINE
Payer: COMMERCIAL

## 2018-12-18 VITALS
HEIGHT: 72 IN | TEMPERATURE: 98.7 F | BODY MASS INDEX: 32.1 KG/M2 | SYSTOLIC BLOOD PRESSURE: 128 MMHG | HEART RATE: 68 BPM | DIASTOLIC BLOOD PRESSURE: 80 MMHG | OXYGEN SATURATION: 96 % | WEIGHT: 237 LBS

## 2018-12-18 DIAGNOSIS — Z13.9 SCREENING FOR CONDITION: ICD-10-CM

## 2018-12-18 DIAGNOSIS — J45.41 MODERATE PERSISTENT ASTHMA WITH EXACERBATION: ICD-10-CM

## 2018-12-18 DIAGNOSIS — Z13.6 CARDIOVASCULAR SCREENING; LDL GOAL LESS THAN 160: ICD-10-CM

## 2018-12-18 DIAGNOSIS — Z12.5 SCREENING PSA (PROSTATE SPECIFIC ANTIGEN): ICD-10-CM

## 2018-12-18 DIAGNOSIS — Z00.00 ROUTINE HISTORY AND PHYSICAL EXAMINATION OF ADULT: Primary | ICD-10-CM

## 2018-12-18 PROCEDURE — 99213 OFFICE O/P EST LOW 20 MIN: CPT | Mod: 25 | Performed by: FAMILY MEDICINE

## 2018-12-18 PROCEDURE — 71046 X-RAY EXAM CHEST 2 VIEWS: CPT | Mod: FY

## 2018-12-18 PROCEDURE — 99396 PREV VISIT EST AGE 40-64: CPT | Performed by: FAMILY MEDICINE

## 2018-12-18 RX ORDER — PREDNISONE 20 MG/1
20 TABLET ORAL 2 TIMES DAILY
Qty: 10 TABLET | Refills: 0 | Status: SHIPPED | OUTPATIENT
Start: 2018-12-18 | End: 2018-12-23

## 2018-12-18 RX ORDER — ALBUTEROL SULFATE 90 UG/1
2 AEROSOL, METERED RESPIRATORY (INHALATION) 4 TIMES DAILY PRN
Qty: 1 INHALER | Refills: 1 | Status: SHIPPED | OUTPATIENT
Start: 2018-12-18 | End: 2020-11-12

## 2018-12-18 ASSESSMENT — MIFFLIN-ST. JEOR: SCORE: 1940.08

## 2018-12-18 NOTE — PROGRESS NOTES
SUBJECTIVE:   CC: Orlando Holder is an 53 year old male who presents for preventive health visit.     Healthy Habits:    Do you get at least three servings of calcium containing foods daily (dairy, green leafy vegetables, etc.)? yes    Amount of exercise or daily activities, outside of work: 2-3 day(s) per week    Problems taking medications regularly No    Medication side effects: No    Have you had an eye exam in the past two years? yes    Do you see a dentist twice per year? yes    Do you have sleep apnea, excessive snoring or daytime drowsiness?yes CPAP       PROBLEMS TO ADD ON...  Has  Uri sx past week or so . Coughing up more phlegm  grey and also feeling more sob and wheezy recently, and using inhaler at least couple of times a day . No fever or chills .       Today's PHQ-2 Score:   PHQ-2 (  [employer]) 2018   Q1: Little interest or pleasure in doing things 0 0   Q2: Feeling down, depressed or hopeless 0 0   PHQ-2 Score 0 0       Abuse: Current or Past(Physical, Sexual or Emotional)- No  Do you feel safe in your environment? Yes    Social History     Tobacco Use     Smoking status: Former Smoker     Packs/day: 0.50     Years: 20.00     Pack years: 10.00     Types: Cigarettes     Last attempt to quit: 3/26/2001     Years since quittin.7     Smokeless tobacco: Never Used   Substance Use Topics     Alcohol use: Yes     Alcohol/week: 0.0 oz     If you drink alcohol do you typically have >3 drinks per day or >7 drinks per week? No                      Last PSA:   PSA   Date Value Ref Range Status   2017 0.48 0 - 4 ug/L Final     Comment:     Assay Method:  Chemiluminescence using Siemens Vista analyzer       Reviewed orders with patient. Reviewed health maintenance and updated orders accordingly - Yes  Patient Active Problem List   Diagnosis     Nonspecific abnormal results of liver function study     Difficulty concentrating     Nevus     CARDIOVASCULAR SCREENING; LDL GOAL LESS  THAN 160     Erectile dysfunction     Common wart     Achilles tendonitis     Anal fissure     Obesity     History of colonic polyps     ADD (attention deficit disorder)     Controlled substance agreement signed     Seasonal allergic rhinitis, unspecified allergic rhinitis trigger     Moderate persistent asthma without complication     BMI 38.0-38.9,adult     Obesity, unspecified obesity severity, unspecified obesity type     Abnormal thyroid blood test     Elevated fasting glucose     Morbid obesity (H)     Screening PSA (prostate specific antigen)     Past Surgical History:   Procedure Laterality Date     C NONSPECIFIC PROCEDURE      repair deviated septum     HC REPAIR INCISIONAL HERNIA,REDUCIBLE      Hernia Repair,  inguinal, Unilateral x2 on rt.       Social History     Tobacco Use     Smoking status: Former Smoker     Packs/day: 0.50     Years: 20.00     Pack years: 10.00     Types: Cigarettes     Last attempt to quit: 3/26/2001     Years since quittin.7     Smokeless tobacco: Never Used   Substance Use Topics     Alcohol use: Yes     Alcohol/week: 0.0 oz     Family History   Problem Relation Age of Onset     Hypertension Paternal Grandmother      C.A.D. Paternal Grandmother      Arthritis Paternal Grandmother      Diabetes No family hx of      Prostate Cancer No family hx of      Cancer - colorectal No family hx of            Reviewed and updated as needed this visit by clinical staff         Reviewed and updated as needed this visit by Provider        Past Medical History:   Diagnosis Date     Allergic rhinitis, cause unspecified      Contact dermatitis and other eczema, due to unspecified cause     nummular/asteatotic     Herpes zoster 2015    right ear and neck     Moderate persistent asthma      Unspecified sinusitis (chronic)      Unspecified sleep apnea     C-Pap        ROS:  CONSTITUTIONAL: NEGATIVE for fever, chills, change in weight  INTEGUMENTARY/SKIN: NEGATIVE for worrisome rashes,  moles or lesions  EYES: NEGATIVE for vision changes or irritation  ENT: NEGATIVE for ear, mouth and throat problems  RESP: NEGATIVE for significant cough or SOB  CV: NEGATIVE for chest pain, palpitations or peripheral edema  GI: NEGATIVE for nausea, abdominal pain, heartburn, or change in bowel habits   male: negative for dysuria, hematuria, decreased urinary stream, erectile dysfunction, urethral discharge  MUSCULOSKELETAL:NEGATIVE for significant arthralgias or myalgia, POSITIVE  for planter fascitis  and knee arthritis, seeing ortho   NEURO: NEGATIVE for weakness, dizziness or paresthesias  ENDOCRINE: NEGATIVE for temperature intolerance, skin/hair changes  HEME/ALLERGY/IMMUNE: NEGATIVE for bleeding problems  PSYCHIATRIC: NEGATIVE for changes in mood or affect    OBJECTIVE:   There were no vitals taken for this visit.  EXAM:  GENERAL: healthy, alert and no distress  EYES: Eyes grossly normal to inspection, PERRL and conjunctivae and sclerae normal  HENT: ear canals and TM's normal, nose and mouth without ulcers or lesions  NECK: no adenopathy, no asymmetry, masses, or scars and thyroid normal to palpation  RESP: lungs with few wheezes at the base of lungs, but otherwise rest of the lungs clear to auscultation - no rales, occasional rhonchi at the bases   CV: regular rate and rhythm, normal S1 S2, no S3 or S4, no murmur, click or rub, no peripheral edema   ABDOMEN: soft, nontender, no hepatosplenomegaly, no masses and bowel sounds normal   (male): testicles normal without atrophy or masses, no hernias and penis normal without urethral discharge  RECTAL: normal sphincter tone, no rectal masses, prostate normal size, smooth, nontender without nodules or masses  MS: no gross musculoskeletal defects noted, no edema  SKIN: no suspicious lesions or rashes  NEURO: Normal strength and tone, mentation intact and speech normal  PSYCH: mentation appears normal, affect normal/bright        ASSESSMENT/PLAN:   (Z00.00)  "Routine history and physical examination of adult  (primary encounter diagnosis)  Comment:   Plan: Lipid panel reflex to direct LDL Fasting,         Glucose FUTURE 1yr, Prostate spec antigen         Screen, HIV Antigen Antibody Combo FUTURE         anytime              (Z13.6) CARDIOVASCULAR SCREENING; LDL GOAL LESS THAN 160  Comment:   Plan: Lipid panel reflex to direct LDL Fasting            (Z12.5) Screening PSA (prostate specific antigen)  Comment:   Plan: Prostate spec antigen screen        (Z13.9) Screening for condition  Comment:   Plan: HIV Antigen Antibody Combo FUTURE         anytime      (J45.41) Moderate persistent asthma with exacerbation  Comment:   Plan: albuterol (PROAIR HFA) 108 (90 Base) MCG/ACT         inhaler, predniSONE (DELTASONE) 20 MG tablet,         XR Chest 2 Views                xray's negative except mild atelectasis at the base, radiology report is pending. Start oral prednisone bc of recent aggravation of his sx. Discussed asthma and treatment. gave oral  prednisone x 5 days . Refill give on albuterol to use as needed. Need to monitor the need for albuterol . Cares and symptomatic treatment discussed follow up  In 4 weeks sooner, if problem      COUNSELING:  Reviewed preventive health counseling, as reflected in patient instructions       Regular exercise       Healthy diet/nutrition       Hearing screening       Immunizations    Vaccinated for: Zoster          BP Readings from Last 1 Encounters:   11/14/18 127/79     Estimated body mass index is 39.61 kg/m  as calculated from the following:    Height as of 11/14/18: 1.816 m (5' 11.5\").    Weight as of 11/14/18: 130.6 kg (288 lb).      Weight management plan: Discussed healthy diet and exercise guidelines     reports that he quit smoking about 17 years ago. His smoking use included cigarettes. He has a 10.00 pack-year smoking history. he has never used smokeless tobacco.      Counseling Resources:  ATP IV Guidelines  Pooled Cohorts " Equation Calculator  FRAX Risk Assessment  ICSI Preventive Guidelines  Dietary Guidelines for Americans, 2010  USDA's MyPlate  ASA Prophylaxis  Lung CA Screening    Kay Ramirez MD  [facility] [place]

## 2018-12-19 ASSESSMENT — ASTHMA QUESTIONNAIRES: ACT_TOTALSCORE: 24

## 2018-12-24 DIAGNOSIS — Z13.6 CARDIOVASCULAR SCREENING; LDL GOAL LESS THAN 160: ICD-10-CM

## 2018-12-24 DIAGNOSIS — Z12.5 SCREENING PSA (PROSTATE SPECIFIC ANTIGEN): ICD-10-CM

## 2018-12-24 DIAGNOSIS — Z00.00 ROUTINE HISTORY AND PHYSICAL EXAMINATION OF ADULT: ICD-10-CM

## 2018-12-24 LAB — PSA SERPL-ACNC: 0.39 UG/L (ref 0–4)

## 2018-12-24 PROCEDURE — 36415 COLL VENOUS BLD VENIPUNCTURE: CPT | Performed by: FAMILY MEDICINE

## 2018-12-24 PROCEDURE — 87389 HIV-1 AG W/HIV-1&-2 AB AG IA: CPT | Performed by: FAMILY MEDICINE

## 2018-12-24 PROCEDURE — 82947 ASSAY GLUCOSE BLOOD QUANT: CPT | Performed by: FAMILY MEDICINE

## 2018-12-24 PROCEDURE — 80061 LIPID PANEL: CPT | Performed by: FAMILY MEDICINE

## 2018-12-24 PROCEDURE — G0103 PSA SCREENING: HCPCS | Performed by: FAMILY MEDICINE

## 2018-12-25 LAB
CHOLEST SERPL-MCNC: 168 MG/DL
GLUCOSE SERPL-MCNC: 135 MG/DL (ref 70–99)
HDLC SERPL-MCNC: 70 MG/DL
LDLC SERPL CALC-MCNC: 86 MG/DL
NONHDLC SERPL-MCNC: 98 MG/DL
TRIGL SERPL-MCNC: 62 MG/DL

## 2018-12-26 LAB — HIV 1+2 AB+HIV1 P24 AG SERPL QL IA: NONREACTIVE

## 2019-03-29 ENCOUNTER — TELEPHONE (OUTPATIENT)
Dept: FAMILY MEDICINE | Facility: CLINIC | Age: 54
End: 2019-03-29

## 2019-04-03 NOTE — TELEPHONE ENCOUNTER
PA Initiation    Medication: amphetamine-dextroamphetamine (ADDERALL XR) 20 MG per 24 hr capsule- INITIATED  Insurance Company: [employer] ([facility]) - Phone [phone] Fax [phone]  Pharmacy Filling the Rx: [id number] - [place], [place] - [address]  Filling Pharmacy Phone: [phone]  Filling Pharmacy Fax:    Start Date: 4/3/2019

## 2019-04-04 NOTE — TELEPHONE ENCOUNTER
Prior Authorization Approval    Authorization Effective Date: 4/3/2019  Authorization Expiration Date: 4/3/2020  Medication: amphetamine-dextroamphetamine (ADDERALL XR) 20 MG per 24 hr capsule- APPROVED  Approved Dose/Quantity: 30 PER 30 DAYS  Reference #: [id number]   Insurance Company: [employer] ([facility]) - Phone [phone] Fax [phone]  Expected CoPay:       CoPay Card Available:      Foundation Assistance Needed:    Which Pharmacy is filling the prescription (Not needed for infusion/clinic administered): [id number] - [place] - [address]  Pharmacy Notified: Yes  Patient Notified: Yes

## 2019-04-05 ENCOUNTER — TELEPHONE (OUTPATIENT)
Dept: FAMILY MEDICINE | Facility: CLINIC | Age: 54
End: 2019-04-05

## 2019-04-12 NOTE — TELEPHONE ENCOUNTER
Central Prior Authorization Team   Phone: [phone]      PA Initiation    Medication: amphetamine-dextroamphetamine (ADDERALL) 10 MG per tablet-Initiated  Insurance Company: [employer] ([facility]) - Phone [phone] Fax [phone]  Pharmacy Filling the Rx: [id number] - [place], [place] - [address]  Filling Pharmacy Phone: [phone]  Filling Pharmacy Fax:    Start Date: 4/12/2019

## 2019-05-08 ENCOUNTER — OFFICE VISIT (OUTPATIENT)
Dept: FAMILY MEDICINE | Facility: CLINIC | Age: 54
End: 2019-05-08
Payer: COMMERCIAL

## 2019-05-08 VITALS
OXYGEN SATURATION: 98 % | HEART RATE: 58 BPM | TEMPERATURE: 97.1 F | SYSTOLIC BLOOD PRESSURE: 126 MMHG | BODY MASS INDEX: 37.11 KG/M2 | DIASTOLIC BLOOD PRESSURE: 70 MMHG | WEIGHT: 274 LBS | HEIGHT: 72 IN

## 2019-05-08 DIAGNOSIS — Z79.899 CONTROLLED SUBSTANCE AGREEMENT SIGNED: ICD-10-CM

## 2019-05-08 DIAGNOSIS — J45.901 MODERATE ASTHMA WITH EXACERBATION, UNSPECIFIED WHETHER PERSISTENT: ICD-10-CM

## 2019-05-08 DIAGNOSIS — F98.8 ATTENTION DEFICIT DISORDER, UNSPECIFIED HYPERACTIVITY PRESENCE: ICD-10-CM

## 2019-05-08 DIAGNOSIS — J45.40 MODERATE PERSISTENT ASTHMA, UNCOMPLICATED: ICD-10-CM

## 2019-05-08 PROCEDURE — 99214 OFFICE O/P EST MOD 30 MIN: CPT | Performed by: FAMILY MEDICINE

## 2019-05-08 RX ORDER — ALBUTEROL SULFATE 90 UG/1
2 AEROSOL, METERED RESPIRATORY (INHALATION) EVERY 6 HOURS PRN
Qty: 18 G | Refills: 0 | Status: SHIPPED | OUTPATIENT
Start: 2019-05-08 | End: 2023-01-18

## 2019-05-08 RX ORDER — DEXTROAMPHETAMINE SACCHARATE, AMPHETAMINE ASPARTATE MONOHYDRATE, DEXTROAMPHETAMINE SULFATE AND AMPHETAMINE SULFATE 5; 5; 5; 5 MG/1; MG/1; MG/1; MG/1
20 CAPSULE, EXTENDED RELEASE ORAL DAILY
Qty: 30 CAPSULE | Refills: 0 | Status: SHIPPED | OUTPATIENT
Start: 2019-06-08 | End: 2019-09-26

## 2019-05-08 RX ORDER — DEXTROAMPHETAMINE SACCHARATE, AMPHETAMINE ASPARTATE MONOHYDRATE, DEXTROAMPHETAMINE SULFATE AND AMPHETAMINE SULFATE 5; 5; 5; 5 MG/1; MG/1; MG/1; MG/1
20 CAPSULE, EXTENDED RELEASE ORAL DAILY
Qty: 30 CAPSULE | Refills: 0 | Status: SHIPPED | OUTPATIENT
Start: 2019-05-08 | End: 2019-09-26

## 2019-05-08 RX ORDER — DEXTROAMPHETAMINE SACCHARATE, AMPHETAMINE ASPARTATE, DEXTROAMPHETAMINE SULFATE AND AMPHETAMINE SULFATE 1.25; 1.25; 1.25; 1.25 MG/1; MG/1; MG/1; MG/1
5 TABLET ORAL DAILY
Qty: 30 TABLET | Refills: 0 | Status: SHIPPED | OUTPATIENT
Start: 2019-05-08 | End: 2019-05-08

## 2019-05-08 RX ORDER — DEXTROAMPHETAMINE SACCHARATE, AMPHETAMINE ASPARTATE, DEXTROAMPHETAMINE SULFATE AND AMPHETAMINE SULFATE 1.25; 1.25; 1.25; 1.25 MG/1; MG/1; MG/1; MG/1
5 TABLET ORAL DAILY
Qty: 30 TABLET | Refills: 0 | Status: SHIPPED | OUTPATIENT
Start: 2019-06-08 | End: 2019-09-26

## 2019-05-08 ASSESSMENT — MIFFLIN-ST. JEOR: SCORE: 2102.92

## 2019-05-08 NOTE — PROGRESS NOTES
SUBJECTIVE:   Orlando Holder is a 54 year old male who presents to clinic today for the following   health issues:        Medication Followup of Adderall XR 20 MG     Taking Medication as prescribed: yes    Side Effects:  None    Medication Helping Symptoms:  yes       PROBLEMS TO ADD ON...  ADD Followup    Status since last visit: Stable   he is taking Adderall 20 mg XR daily.  He  also has been given regular Adderall 10 mg,  usually takes around noon to help him with the afternoon.  Although he tells me that he does not really use  it very often, and may not feel the need for it as much sometimes.  he does not take it over the weekends  Often.  On further conversation he is willing to try to lower the dose to see if that might be sufficient for him.      no problem taking med's otherwise     See PHQ-9 for current symptoms.  Other associated symptoms: None    Complicating factors:   Significant life event:  No   Current substance abuse:  None  Anxiety or Panic symptoms:  No    No flowsheet data found.    PHQ-9  English  PHQ-9   Any Language  Suicide Assessment Five-step Evaluation and Treatment (SAFE-T)  Asthma Follow-Up    Was ACT completed today?    Yes  , is well controlled.  Has not really used albuterol in a while.  We will on controller medications.   ACT Total Scores 5/8/2019   ACT TOTAL SCORE -   ASTHMA ER VISITS -   ASTHMA HOSPITALIZATIONS -   ACT TOTAL SCORE (Goal Greater than or Equal to 20) 25   In the past 12 months, how many times did you visit the emergency room for your asthma without being admitted to the hospital? 0   In the past 12 months, how many times were you hospitalized overnight because of your asthma? 0       Recent asthma triggers that patient is dealing with: None        Additional history: as documented    Reviewed  and updated as needed this visit by clinical staff         Reviewed and updated as needed this visit by Provider         Patient Active Problem List   Diagnosis  "    Nonspecific abnormal results of liver function study     Difficulty concentrating     Nevus     CARDIOVASCULAR SCREENING; LDL GOAL LESS THAN 160     Erectile dysfunction     Common wart     Achilles tendonitis     Anal fissure     Obesity     History of colonic polyps     ADD (attention deficit disorder)     Controlled substance agreement signed     Seasonal allergic rhinitis, unspecified allergic rhinitis trigger     Moderate persistent asthma without complication     BMI 38.0-38.9,adult     Obesity, unspecified obesity severity, unspecified obesity type     Abnormal thyroid blood test     Elevated fasting glucose     Morbid obesity (H)     Screening PSA (prostate specific antigen)     Past Surgical History:   Procedure Laterality Date     C NONSPECIFIC PROCEDURE      repair deviated septum     HC REPAIR INCISIONAL HERNIA,REDUCIBLE      Hernia Repair,  inguinal, Unilateral x2 on rt.       Social History     Tobacco Use     Smoking status: Former Smoker     Packs/day: 0.50     Years: 20.00     Pack years: 10.00     Types: Cigarettes     Last attempt to quit: 3/26/2001     Years since quittin.1     Smokeless tobacco: Never Used   Substance Use Topics     Alcohol use: Yes     Alcohol/week: 0.0 oz     Family History   Problem Relation Age of Onset     Hypertension Paternal Grandmother      C.A.D. Paternal Grandmother      Arthritis Paternal Grandmother      Diabetes No family hx of      Prostate Cancer No family hx of      Cancer - colorectal No family hx of            ROS:  Constitutional, HEENT, cardiovascular, pulmonary, GI, , musculoskeletal, neuro, skin, endocrine and psych systems are negative, except as otherwise noted.    OBJECTIVE:     /70   Pulse 58   Temp 97.1  F (36.2  C) (Tympanic)   Ht 1.816 m (5' 11.5\")   Wt 124.3 kg (274 lb)   SpO2 98%   BMI 37.68 kg/m    Body mass index is 37.68 kg/m .  GENERAL: healthy, alert and no distress  NECK: no adenopathy, no asymmetry, masses, or " scars and thyroid normal to palpation  RESP: lungs clear to auscultation - no rales, rhonchi or wheezes  CV: regular rate and rhythm, normal S1 S2, no S3 or S4, no murmur,   NEURO: Normal strength and tone, mentation intact and speech normal  PSYCH: mentation appears normal, affect normal/bright        ASSESSMENT/PLAN:         (F98.8) Attention deficit disorder, unspecified hyperactivity presence  Comment:   Plan: amphetamine-dextroamphetamine (ADDERALL XR) 20         MG 24 hr capsule, amphetamine-dextroamphetamine        (ADDERALL XR) 20 MG 24 hr capsule,         amphetamine-dextroamphetamine (ADDERALL) 5 MG         tablet, DISCONTINUED:         amphetamine-dextroamphetamine (ADDERALL) 5 MG         Tablet            (Z79.899) Controlled substance agreement signed  Comment:   Plan: amphetamine-dextroamphetamine (ADDERALL XR) 20         MG 24 hr capsule, amphetamine-dextroamphetamine        (ADDERALL XR) 20 MG 24 hr capsule,         amphetamine-dextroamphetamine (ADDERALL) 5 MG         tablet, DISCONTINUED:         amphetamine-dextroamphetamine (ADDERALL) 5 MG         tablet                      Willing to try lower dose of Adderall for aftrenoon dose to see it works.  So we will try the 5 mg instead of 10 .  He will monitor f/u in couple of months        sooner if problem       (J45.40) Moderate persistent asthma, uncomplicated  Comment: Stable  Plan: fluticasone-salmeterol (ADVAIR DISKUS) 500-50         MCG/DOSE inhaler, albuterol (PROAIR         HFA/PROVENTIL HFA/VENTOLIN HFA) 108 (90 Base)         MCG/ACT inhaler, montelukast (SINGULAIR) 10 MG         tablet          Refill given.      Follow-up in 6 months, sooner if any problem.       Doing well . refill sent.Cares and  treatment discussed follow. up if problem   Patient expressed understanding and agreement with treatment plan. All patient's questions were answered, will let me know if has more later.  Medications: Rx's: Reviewed the potential side  effects/complications of medications prescribed.       Kay Ramirez MD  [facility] [place]

## 2019-05-09 ASSESSMENT — ASTHMA QUESTIONNAIRES: ACT_TOTALSCORE: 25

## 2019-05-13 RX ORDER — MONTELUKAST SODIUM 10 MG/1
10 TABLET ORAL AT BEDTIME
Qty: 90 TABLET | Refills: 1 | Status: SHIPPED | OUTPATIENT
Start: 2019-05-13 | End: 2019-09-26

## 2019-09-26 ENCOUNTER — OFFICE VISIT (OUTPATIENT)
Dept: FAMILY MEDICINE | Facility: CLINIC | Age: 54
End: 2019-09-26
Payer: COMMERCIAL

## 2019-09-26 VITALS
WEIGHT: 276 LBS | DIASTOLIC BLOOD PRESSURE: 66 MMHG | SYSTOLIC BLOOD PRESSURE: 118 MMHG | TEMPERATURE: 97.6 F | BODY MASS INDEX: 37.38 KG/M2 | HEART RATE: 60 BPM | OXYGEN SATURATION: 96 % | HEIGHT: 72 IN

## 2019-09-26 DIAGNOSIS — Z79.899 CONTROLLED SUBSTANCE AGREEMENT SIGNED: ICD-10-CM

## 2019-09-26 DIAGNOSIS — F98.8 ATTENTION DEFICIT DISORDER, UNSPECIFIED HYPERACTIVITY PRESENCE: ICD-10-CM

## 2019-09-26 DIAGNOSIS — J30.89 SEASONAL ALLERGIC RHINITIS DUE TO OTHER ALLERGIC TRIGGER: ICD-10-CM

## 2019-09-26 DIAGNOSIS — J45.40 MODERATE PERSISTENT ASTHMA, UNCOMPLICATED: ICD-10-CM

## 2019-09-26 PROCEDURE — 99214 OFFICE O/P EST MOD 30 MIN: CPT | Performed by: FAMILY MEDICINE

## 2019-09-26 RX ORDER — MONTELUKAST SODIUM 10 MG/1
10 TABLET ORAL AT BEDTIME
Qty: 90 TABLET | Refills: 1 | Status: SHIPPED | OUTPATIENT
Start: 2019-09-26 | End: 2020-02-04

## 2019-09-26 RX ORDER — DEXTROAMPHETAMINE SACCHARATE, AMPHETAMINE ASPARTATE MONOHYDRATE, DEXTROAMPHETAMINE SULFATE AND AMPHETAMINE SULFATE 5; 5; 5; 5 MG/1; MG/1; MG/1; MG/1
20 CAPSULE, EXTENDED RELEASE ORAL DAILY
Qty: 30 CAPSULE | Refills: 0 | Status: SHIPPED | OUTPATIENT
Start: 2019-10-26 | End: 2020-07-11

## 2019-09-26 RX ORDER — FLUTICASONE PROPIONATE 50 MCG
1-2 SPRAY, SUSPENSION (ML) NASAL DAILY
Qty: 1 G | Refills: 11 | Status: SHIPPED | OUTPATIENT
Start: 2019-09-26 | End: 2020-11-12

## 2019-09-26 RX ORDER — DEXTROAMPHETAMINE SACCHARATE, AMPHETAMINE ASPARTATE, DEXTROAMPHETAMINE SULFATE AND AMPHETAMINE SULFATE 1.25; 1.25; 1.25; 1.25 MG/1; MG/1; MG/1; MG/1
5 TABLET ORAL DAILY
Qty: 30 TABLET | Refills: 0 | Status: SHIPPED | OUTPATIENT
Start: 2019-10-26 | End: 2019-09-26

## 2019-09-26 RX ORDER — DEXTROAMPHETAMINE SACCHARATE, AMPHETAMINE ASPARTATE MONOHYDRATE, DEXTROAMPHETAMINE SULFATE AND AMPHETAMINE SULFATE 5; 5; 5; 5 MG/1; MG/1; MG/1; MG/1
20 CAPSULE, EXTENDED RELEASE ORAL DAILY
Qty: 30 CAPSULE | Refills: 0 | Status: SHIPPED | OUTPATIENT
Start: 2019-11-26 | End: 2020-02-04

## 2019-09-26 RX ORDER — DEXTROAMPHETAMINE SACCHARATE, AMPHETAMINE ASPARTATE, DEXTROAMPHETAMINE SULFATE AND AMPHETAMINE SULFATE 1.25; 1.25; 1.25; 1.25 MG/1; MG/1; MG/1; MG/1
5 TABLET ORAL DAILY
Qty: 30 TABLET | Refills: 0 | Status: SHIPPED | OUTPATIENT
Start: 2019-09-26 | End: 2019-09-26

## 2019-09-26 RX ORDER — DEXTROAMPHETAMINE SACCHARATE, AMPHETAMINE ASPARTATE, DEXTROAMPHETAMINE SULFATE AND AMPHETAMINE SULFATE 1.25; 1.25; 1.25; 1.25 MG/1; MG/1; MG/1; MG/1
5 TABLET ORAL DAILY
Qty: 30 TABLET | Refills: 0 | Status: SHIPPED | OUTPATIENT
Start: 2019-11-26 | End: 2020-02-04

## 2019-09-26 RX ORDER — DEXTROAMPHETAMINE SACCHARATE, AMPHETAMINE ASPARTATE MONOHYDRATE, DEXTROAMPHETAMINE SULFATE AND AMPHETAMINE SULFATE 5; 5; 5; 5 MG/1; MG/1; MG/1; MG/1
20 CAPSULE, EXTENDED RELEASE ORAL DAILY
Qty: 30 CAPSULE | Refills: 0 | Status: SHIPPED | OUTPATIENT
Start: 2019-09-26 | End: 2019-09-26

## 2019-09-26 ASSESSMENT — MIFFLIN-ST. JEOR: SCORE: 2111.99

## 2019-09-26 NOTE — PROGRESS NOTES
Subjective     Orlando Holder is a 54 year old male who presents to clinic today for the following health issues:    HPI   Medication Followup of  Adderall XR 20 mg /asthma medication     Taking Medication as prescribed: yes    Side Effects:  None    Medication Helping Symptoms:  yes          ADD Followup    How are you doing with your AD is doing quite well on current medication D since your last visit? No change    Are you having other symptoms that might be associated with anxiety or depression? No    Have you had a significant life event?  No     Are you  current medication dose is working well for you?  Yes    Do you have any concerns with your use of alcohol or other drugs? No    Social History     Tobacco Use     Smoking status: Former Smoker     Packs/day: 0.50     Years: 20.00     Pack years: 10.00     Types: Cigarettes     Last attempt to quit: 3/26/2001     Years since quittin.5     Smokeless tobacco: Never Used   Substance Use Topics     Alcohol use: Yes     Alcohol/week: 0.0 standard drinks     Drug use: No     No flowsheet data found.  No flowsheet data found.        Suicide Assessment Five-step Evaluation and Treatment (SAFE-T)  Asthma/allergy follow-Up    Was ACT completed today?    Yes  , is doing well on current medication rarely needs albuterol.  Allergies are also well controlled and Flonase helps.   ACT Total Scores 2019   ACT TOTAL SCORE -   ASTHMA ER VISITS -   ASTHMA HOSPITALIZATIONS -   ACT TOTAL SCORE (Goal Greater than or Equal to 20) 24   In the past 12 months, how many times did you visit the emergency room for your asthma without being admitted to the hospital? 0   In the past 12 months, how many times were you hospitalized overnight because of your asthma? 0       How many days per week do you miss taking your asthma controller medication?  I do not have an asthma controller medication    Please describe any recent triggers for your asthma: None    Have you had any  Emergency Room Visits, Urgent Care Visits, or Hospital Admissions since your last office visit?  No        Patient Active Problem List   Diagnosis     Nonspecific abnormal results of liver function study     Difficulty concentrating     Nevus     CARDIOVASCULAR SCREENING; LDL GOAL LESS THAN 160     Erectile dysfunction     Common wart     Achilles tendonitis     Anal fissure     Obesity     History of colonic polyps     ADD (attention deficit disorder)     Controlled substance agreement signed     Seasonal allergic rhinitis, unspecified allergic rhinitis trigger     Moderate persistent asthma without complication     BMI 38.0-38.9,adult     Obesity, unspecified obesity severity, unspecified obesity type     Abnormal thyroid blood test     Elevated fasting glucose     Morbid obesity (H)     Screening PSA (prostate specific antigen)     Past Surgical History:   Procedure Laterality Date     C NONSPECIFIC PROCEDURE      repair deviated septum     HC REPAIR INCISIONAL HERNIA,REDUCIBLE      Hernia Repair,  inguinal, Unilateral x2 on rt.       Social History     Tobacco Use     Smoking status: Former Smoker     Packs/day: 0.50     Years: 20.00     Pack years: 10.00     Types: Cigarettes     Last attempt to quit: 3/26/2001     Years since quittin.5     Smokeless tobacco: Never Used   Substance Use Topics     Alcohol use: Yes     Alcohol/week: 0.0 standard drinks     Family History   Problem Relation Age of Onset     Hypertension Paternal Grandmother      C.A.D. Paternal Grandmother      Arthritis Paternal Grandmother      Diabetes No family hx of      Prostate Cancer No family hx of      Cancer - colorectal No family hx of            Reviewed and updated as needed this visit by Provider         Review of Systems   ROS COMP: Constitutional, HEENT, cardiovascular, pulmonary, GI, , musculoskeletal, neuro, skin, endocrine and psych systems are negative, except as otherwise noted.      Objective    There were no  vitals taken for this visit.  There is no height or weight on file to calculate BMI.  Physical Exam   GENERAL: healthy, alert and no distress  HENT: oropharynx clear and oral mucous membranes moist  NECK: no adenopathy  RESP: lungs clear to auscultation - no rales, rhonchi or wheezes  CV: regular rate and rhythm, normal S1 S2, no S3 or S4,   NEURO: Normal strength and tone, mentation intact and speech normal  PSYCH: mentation appears normal, affect normal/bright            Assessment & Plan     (Z79.899) Controlled substance agreement signed  Comment:   Plan: amphetamine-dextroamphetamine (ADDERALL XR) 20         MG 24 hr capsule, amphetamine-dextroamphetamine        (ADDERALL XR) 20 MG 24 hr capsule,         amphetamine-dextroamphetamine (ADDERALL) 5 MG         tablet, DISCONTINUED:         amphetamine-dextroamphetamine (ADDERALL XR) 20         MG 24 hr capsule, DISCONTINUED:         amphetamine-dextroamphetamine (ADDERALL) 5 MG         tablet, DISCONTINUED:         amphetamine-dextroamphetamine (ADDERALL XR) 20         MG 24 hr capsule, DISCONTINUED:         amphetamine-dextroamphetamine (ADDERALL) 5 MG         tablet, DISCONTINUED:         amphetamine-dextroamphetamine (ADDERALL) 5 MG         tablet            (F98.8) Attention deficit disorder, unspecified hyperactivity presence  Comment:   Plan: amphetamine-dextroamphetamine (ADDERALL XR) 20         MG 24 hr capsule, amphetamine-dextroamphetamine        (ADDERALL XR) 20 MG 24 hr capsule,         amphetamine-dextroamphetamine (ADDERALL) 5 MG         tablet, DISCONTINUED:         amphetamine-dextroamphetamine (ADDERALL XR) 20         MG 24 hr capsule, DISCONTINUED:         amphetamine-dextroamphetamine (ADDERALL) 5 MG         tablet, DISCONTINUED:         amphetamine-dextroamphetamine (ADDERALL XR) 20         MG 24 hr capsule, DISCONTINUED:         amphetamine-dextroamphetamine (ADDERALL) 5 MG         tablet, DISCONTINUED:         amphetamine-dextroamphetamine  "(ADDERALL) 5 MG         tablet          Well-controlled on current medication.  No problem taking the medication.  Given prescription for 3 months.  Follow-up in 3 to 4 months or as needed.       (J45.40) Moderate persistent asthma, uncomplicated  Comment: stable   Plan: fluticasone-salmeterol (ADVAIR DISKUS) 500-50         MCG/DOSE inhaler, montelukast (SINGULAIR) 10 MG        tablet            (J30.89) Seasonal allergic rhinitis due to other allergic trigger  Comment: stable   Plan: fluticasone (FLONASE) 50 MCG/ACT nasal spray          .Cares and  treatment discussed follow. up if problem   Patient expressed understanding and agreement with treatment plan. All patient's questions were answered, will let me know if has more later.  Medications: Rx's: Reviewed the potential side effects/complications of medications prescribed.        BMI:   Estimated body mass index is 37.96 kg/m  as calculated from the following:    Height as of this encounter: 1.816 m (5' 11.5\").    Weight as of this encounter: 125.2 kg (276 lb).   Weight management plan: Discussed healthy diet and exercise guidelines        Return in about 3 months (around 12/26/2019).    Kay Ramirez MD  [facility] [place]    "

## 2019-09-27 ASSESSMENT — ASTHMA QUESTIONNAIRES: ACT_TOTALSCORE: 24

## 2019-10-31 ENCOUNTER — OFFICE VISIT (OUTPATIENT)
Dept: FAMILY MEDICINE | Facility: CLINIC | Age: 54
End: 2019-10-31
Payer: COMMERCIAL

## 2019-10-31 VITALS
DIASTOLIC BLOOD PRESSURE: 80 MMHG | HEART RATE: 84 BPM | TEMPERATURE: 97.4 F | SYSTOLIC BLOOD PRESSURE: 130 MMHG | BODY MASS INDEX: 38.1 KG/M2 | OXYGEN SATURATION: 96 % | WEIGHT: 277 LBS

## 2019-10-31 DIAGNOSIS — J01.10 ACUTE FRONTAL SINUSITIS, RECURRENCE NOT SPECIFIED: Primary | ICD-10-CM

## 2019-10-31 PROCEDURE — 99213 OFFICE O/P EST LOW 20 MIN: CPT | Performed by: FAMILY MEDICINE

## 2019-10-31 NOTE — PROGRESS NOTES
"Subjective     Orlando Holder is a 54 year old male who presents to clinic today for the following health issues:    HPI   Acute Illness   Acute illness concerns: right sided sinus pressure/pain  Onset: 1+ week    Fever: no    Chills/Sweats: no    Headache (location?): no    Sinus Pressure:YES- right sided - into teeth/ear    Conjunctivitis:  no    Ear Pain: no    Rhinorrhea: no    Congestion: YES    Sore Throat: no     Cough: no    Wheeze: YES    Decreased Appetite: YES    Nausea: no    Vomiting: no    Diarrhea:  no    Dysuria/Freq.: no    Fatigue/Achiness: no    Sick/Strep Exposure: no     Therapies Tried and outcome: Sinus otc                Reviewed and updated as needed this visit by Provider         Review of Systems   ROS COMP: Constitutional, HEENT, cardiovascular, pulmonary, gi and gu systems are negative, except as otherwise noted.      Objective    /80   Pulse 84   Temp 97.4  F (36.3  C) (Tympanic)   Wt 125.6 kg (277 lb)   SpO2 96%   BMI 38.10 kg/m    Body mass index is 38.1 kg/m .  Physical Exam   GENERAL: healthy, alert and no distress  NECK: no adenopathy, no asymmetry, masses, or scars and thyroid normal to palpation  RESP: lungs clear to auscultation - no rales, rhonchi or wheezes  CV: regular rate and rhythm, normal S1 S2, no S3 or S4, no murmur, click or rub, no peripheral edema and peripheral pulses strong  Sinus tenderness noted.            Assessment & Plan     1. Acute frontal sinusitis, recurrence not specified  Patient has acute frontal sinusitis with some tenderness.  Suggested Augmentin along with Flonase and saline nasal rinse.  - amoxicillin-clavulanate (AUGMENTIN) 875-125 MG tablet; Take 1 tablet by mouth 2 times daily for 10 days  Dispense: 20 tablet; Refill: 0     BMI:   Estimated body mass index is 38.1 kg/m  as calculated from the following:    Height as of 9/26/19: 1.816 m (5' 11.5\").    Weight as of this encounter: 125.6 kg (277 lb).     Charlie Kingsley MD  [place] [facility]

## 2019-11-02 ASSESSMENT — ASTHMA QUESTIONNAIRES: ACT_TOTALSCORE: 24

## 2019-11-08 DIAGNOSIS — Z79.899 CONTROLLED SUBSTANCE AGREEMENT SIGNED: ICD-10-CM

## 2019-11-08 DIAGNOSIS — F98.8 ATTENTION DEFICIT DISORDER, UNSPECIFIED HYPERACTIVITY PRESENCE: ICD-10-CM

## 2019-11-08 NOTE — TELEPHONE ENCOUNTER
Controlled Substance Refill Request for amphetamine-dextroamphetamine (ADDERALL XR) 20 MG 24 hr capsule  Problem List Complete:  Yes    Last Written Prescription Date:  11-26-19  Last Fill Quantity: 30,   # refills: 0    THE MOST RECENT OFFICE VISIT MUST BE WITHIN THE PAST 3 MONTHS. AT LEAST ONE FACE TO FACE VISIT MUST OCCUR EVERY 6 MONTHS. ADDITIONAL VISITS CAN BE VIRTUAL.  (THIS STATEMENT SHOULD BE DELETED.)    Last Office Visit with [facility] primary care provider: 10-31-19 with YIMI Kingsley    Future Office visit:     Controlled substance agreement:   Encounter-Level CSA - 12/05/2016:    Controlled Substance Agreement - Scan on 12/6/2016  3:05 PM: CONTROLLED SUBSTANCE AGREEMENT     Patient-Level CSA:    There are no patient-level csa.         Last Urine Drug Screen: No results found for: CDAUT, No results found for: COMDAT, No results found for: THC13, PCP13, COC13, MAMP13, OPI13, AMP13, BZO13, TCA13, MTD13, BAR13, OXY13, PPX13, BUP13

## 2019-11-08 NOTE — TELEPHONE ENCOUNTER
Reason for Call:  Medication or medication refill:    Do you use a [facility]?  Name of the pharmacy and phone number for the current request:  [facility] [place] - [phone]    Name of the medication requested: amphetamine-dextroamphetamine (ADDERALL XR) 20 MG 24 hr capsule    Other request: na    Can we leave a detailed message on this number? YES    Phone number patient can be reached at: Cell number on file:    Telephone Information:   Mobile [phone]       Best Time: anytime,  Please call when ready for pickup    Call taken on 11/8/2019 at 11:45 AM by Bhavana Madrigal

## 2019-11-12 RX ORDER — DEXTROAMPHETAMINE SACCHARATE, AMPHETAMINE ASPARTATE MONOHYDRATE, DEXTROAMPHETAMINE SULFATE AND AMPHETAMINE SULFATE 5; 5; 5; 5 MG/1; MG/1; MG/1; MG/1
20 CAPSULE, EXTENDED RELEASE ORAL DAILY
Qty: 30 CAPSULE | Refills: 0 | OUTPATIENT
Start: 2019-11-12

## 2019-11-13 NOTE — TELEPHONE ENCOUNTER
Patient states request was a mistake and he is aware he has scripts on file at the pharmacy.   Mary Rosario RN   [facility] - Triage

## 2020-01-02 ENCOUNTER — E-VISIT (OUTPATIENT)
Dept: FAMILY MEDICINE | Facility: CLINIC | Age: 55
End: 2020-01-02
Payer: COMMERCIAL

## 2020-01-02 DIAGNOSIS — J45.901 MODERATE ASTHMA WITH EXACERBATION, UNSPECIFIED WHETHER PERSISTENT: ICD-10-CM

## 2020-01-02 DIAGNOSIS — J40 BRONCHITIS: Primary | ICD-10-CM

## 2020-01-02 PROCEDURE — 99213 OFFICE O/P EST LOW 20 MIN: CPT | Performed by: FAMILY MEDICINE

## 2020-01-02 RX ORDER — AZITHROMYCIN 250 MG/1
TABLET, FILM COATED ORAL
Qty: 6 TABLET | Refills: 0 | Status: SHIPPED | OUTPATIENT
Start: 2020-01-02 | End: 2020-02-04

## 2020-01-02 RX ORDER — PREDNISONE 20 MG/1
20 TABLET ORAL 2 TIMES DAILY
Qty: 6 TABLET | Refills: 0 | Status: SHIPPED | OUTPATIENT
Start: 2020-01-02 | End: 2020-01-05

## 2020-02-04 ENCOUNTER — OFFICE VISIT (OUTPATIENT)
Dept: FAMILY MEDICINE | Facility: CLINIC | Age: 55
End: 2020-02-04
Payer: COMMERCIAL

## 2020-02-04 VITALS
SYSTOLIC BLOOD PRESSURE: 110 MMHG | OXYGEN SATURATION: 98 % | HEART RATE: 74 BPM | BODY MASS INDEX: 39.01 KG/M2 | DIASTOLIC BLOOD PRESSURE: 72 MMHG | HEIGHT: 72 IN | TEMPERATURE: 98.6 F | WEIGHT: 288 LBS

## 2020-02-04 DIAGNOSIS — Z79.899 CONTROLLED SUBSTANCE AGREEMENT SIGNED: ICD-10-CM

## 2020-02-04 DIAGNOSIS — J45.40 MODERATE PERSISTENT ASTHMA, UNCOMPLICATED: ICD-10-CM

## 2020-02-04 DIAGNOSIS — F98.8 ATTENTION DEFICIT DISORDER, UNSPECIFIED HYPERACTIVITY PRESENCE: ICD-10-CM

## 2020-02-04 PROCEDURE — 99214 OFFICE O/P EST MOD 30 MIN: CPT | Performed by: FAMILY MEDICINE

## 2020-02-04 RX ORDER — DEXTROAMPHETAMINE SACCHARATE, AMPHETAMINE ASPARTATE MONOHYDRATE, DEXTROAMPHETAMINE SULFATE AND AMPHETAMINE SULFATE 5; 5; 5; 5 MG/1; MG/1; MG/1; MG/1
20 CAPSULE, EXTENDED RELEASE ORAL DAILY
Qty: 30 CAPSULE | Refills: 0 | Status: SHIPPED | OUTPATIENT
Start: 2020-02-04 | End: 2020-02-04

## 2020-02-04 RX ORDER — DEXTROAMPHETAMINE SACCHARATE, AMPHETAMINE ASPARTATE MONOHYDRATE, DEXTROAMPHETAMINE SULFATE AND AMPHETAMINE SULFATE 5; 5; 5; 5 MG/1; MG/1; MG/1; MG/1
20 CAPSULE, EXTENDED RELEASE ORAL DAILY
Qty: 30 CAPSULE | Refills: 0 | Status: CANCELLED | OUTPATIENT
Start: 2020-02-04

## 2020-02-04 RX ORDER — MONTELUKAST SODIUM 10 MG/1
10 TABLET ORAL AT BEDTIME
Qty: 90 TABLET | Refills: 1 | Status: SHIPPED | OUTPATIENT
Start: 2020-02-04 | End: 2020-12-23

## 2020-02-04 RX ORDER — DEXTROAMPHETAMINE SACCHARATE, AMPHETAMINE ASPARTATE, DEXTROAMPHETAMINE SULFATE AND AMPHETAMINE SULFATE 1.25; 1.25; 1.25; 1.25 MG/1; MG/1; MG/1; MG/1
5 TABLET ORAL DAILY
Qty: 30 TABLET | Refills: 0 | Status: SHIPPED | OUTPATIENT
Start: 2020-02-04 | End: 2020-02-04

## 2020-02-04 RX ORDER — DEXTROAMPHETAMINE SACCHARATE, AMPHETAMINE ASPARTATE, DEXTROAMPHETAMINE SULFATE AND AMPHETAMINE SULFATE 1.25; 1.25; 1.25; 1.25 MG/1; MG/1; MG/1; MG/1
5 TABLET ORAL DAILY
Qty: 30 TABLET | Refills: 0 | Status: SHIPPED | OUTPATIENT
Start: 2020-04-04 | End: 2020-04-24

## 2020-02-04 RX ORDER — DEXTROAMPHETAMINE SACCHARATE, AMPHETAMINE ASPARTATE, DEXTROAMPHETAMINE SULFATE AND AMPHETAMINE SULFATE 1.25; 1.25; 1.25; 1.25 MG/1; MG/1; MG/1; MG/1
5 TABLET ORAL DAILY
Qty: 30 TABLET | Refills: 0 | Status: SHIPPED | OUTPATIENT
Start: 2020-03-04 | End: 2020-02-04

## 2020-02-04 RX ORDER — DEXTROAMPHETAMINE SACCHARATE, AMPHETAMINE ASPARTATE MONOHYDRATE, DEXTROAMPHETAMINE SULFATE AND AMPHETAMINE SULFATE 5; 5; 5; 5 MG/1; MG/1; MG/1; MG/1
20 CAPSULE, EXTENDED RELEASE ORAL DAILY
Qty: 30 CAPSULE | Refills: 0 | Status: SHIPPED | OUTPATIENT
Start: 2020-03-04 | End: 2020-02-04

## 2020-02-04 RX ORDER — DEXTROAMPHETAMINE SACCHARATE, AMPHETAMINE ASPARTATE MONOHYDRATE, DEXTROAMPHETAMINE SULFATE AND AMPHETAMINE SULFATE 5; 5; 5; 5 MG/1; MG/1; MG/1; MG/1
20 CAPSULE, EXTENDED RELEASE ORAL DAILY
Qty: 30 CAPSULE | Refills: 0 | Status: SHIPPED | OUTPATIENT
Start: 2020-04-04 | End: 2020-04-24

## 2020-02-04 ASSESSMENT — MIFFLIN-ST. JEOR: SCORE: 2166.42

## 2020-02-04 NOTE — PROGRESS NOTES
Subjective     Orlando Holder is a 54 year old male who presents to clinic today for the following health issues:    HPI   Medication Followup of Adderall /  asthma meds    Taking Medication as prescribed: yes    Side Effects:  None    Medication Helping Symptoms:  yes         Patient Active Problem List   Diagnosis     Nonspecific abnormal results of liver function study     Difficulty concentrating     Nevus     CARDIOVASCULAR SCREENING; LDL GOAL LESS THAN 160     Erectile dysfunction     Common wart     Achilles tendonitis     Anal fissure     Obesity     History of colonic polyps     ADD (attention deficit disorder)     Controlled substance agreement signed     Seasonal allergic rhinitis, unspecified allergic rhinitis trigger     Moderate persistent asthma without complication     BMI 38.0-38.9,adult     Obesity, unspecified obesity severity, unspecified obesity type     Abnormal thyroid blood test     Elevated fasting glucose     Morbid obesity (H)     Screening PSA (prostate specific antigen)     Past Surgical History:   Procedure Laterality Date     C NONSPECIFIC PROCEDURE      repair deviated septum     HC REPAIR INCISIONAL HERNIA,REDUCIBLE      Hernia Repair,  inguinal, Unilateral x2 on rt.       Social History     Tobacco Use     Smoking status: Former Smoker     Packs/day: 0.50     Years: 20.00     Pack years: 10.00     Types: Cigarettes     Last attempt to quit: 3/26/2001     Years since quittin.8     Smokeless tobacco: Never Used   Substance Use Topics     Alcohol use: Yes     Alcohol/week: 0.0 standard drinks     Family History   Problem Relation Age of Onset     Hypertension Paternal Grandmother      C.A.D. Paternal Grandmother      Arthritis Paternal Grandmother      Diabetes No family hx of      Prostate Cancer No family hx of      Cancer - colorectal No family hx of            PROBLEMS TO ADD ON...  ADD  Follow-Up    How are you doing with your ADD since your last visit?  Doing  well on current medication.  Denies any problem taking medication    How are you doing with your anxiety since your last visit?  No no symptoms    Are you having other symptoms that might be associated with depression or anxiety? No    Have you had a significant life event? No     Do you have any concerns with your use of alcohol or other drugs? No    Social History     Tobacco Use     Smoking status: Former Smoker     Packs/day: 0.50     Years: 20.00     Pack years: 10.00     Types: Cigarettes     Last attempt to quit: 3/26/2001     Years since quittin.8     Smokeless tobacco: Never Used   Substance Use Topics     Alcohol use: Yes     Alcohol/week: 0.0 standard drinks     Drug use: No     No flowsheet data found.  No flowsheet data found.  No flowsheet data found.  No flowsheet data found.      Suicide Assessment Five-step Evaluation and Treatment (SAFE-T)    Asthma Follow-Up    Was ACT completed today?    Yes  .  Well-controlled on current controller medication.  Rarely needs albuterol.  Use refill on medications  ACT Total Scores 2020   ACT TOTAL SCORE -   ASTHMA ER VISITS -   ASTHMA HOSPITALIZATIONS -   ACT TOTAL SCORE (Goal Greater than or Equal to 20) 25   In the past 12 months, how many times did you visit the emergency room for your asthma without being admitted to the hospital? 0   In the past 12 months, how many times were you hospitalized overnight because of your asthma? 0       How many days per week do you miss taking your asthma controller medication?  I do  have an asthma controller medication    Please describe any recent triggers for your asthma: None    Have you had any Emergency Room Visits, Urgent Care Visits, or Hospital Admissions since your last office visit?  No    Reviewed and updated as needed this visit by Provider         Review of Systems   ROS COMP: Constitutional, HEENT, cardiovascular, pulmonary, GI, , musculoskeletal, neuro, skin, endocrine and psych systems are negative,  except as otherwise noted.      Objective    There were no vitals taken for this visit.  There is no height or weight on file to calculate BMI.  Physical Exam   GENERAL: healthy, alert and no distress  NECK: no adenopathy,   RESP: lungs clear to auscultation - no rales, rhonchi or wheezes  CV: regular rate and rhythm, normal S1 S2, no S3 or S4,  PSYCH: mentation appears normal, affect normal/bright            Assessment & Plan     (Z79.899) Controlled substance agreement signed  Comment:   Plan: amphetamine-dextroamphetamine (ADDERALL XR) 20         MG 24 hr capsule,       amphetamine-dextroamphetamine   (ADDERALL) 5 MG tablet,              amphetamine-dextroamphetamine (ADDERALL XR) 20         MG 24 hr capsule,         amphetamine-dextroamphetamine (ADDERALL) 5 MG         tablet,          amphetamine-dextroamphetamine (ADDERALL XR) 20         MG 24 hr capsule,        amphetamine-dextroamphetamine (ADDERALL) 5 MG         tablet            (F98.8) Attention deficit disorder, unspecified hyperactivity presence  Comment:   Plan: amphetamine-dextroamphetamine (ADDERALL XR) 20         MG 24 hr capsule,        amphetamine-dextroamphetamine ADDERALL) 5 MG tablet,           amphetamine-dextroamphetamine (ADDERALL XR) 20         MG 24 hr capsule,         amphetamine-dextroamphetamine (ADDERALL) 5 MG         tablet,        amphetamine-dextroamphetamine (ADDERALL XR) 20         MG 24 hr capsule, :         amphetamine-dextroamphetamine (ADDERALL) 5 MG         tablet            Patient doing quite well on current medication.  He is given prescriptions for 3 months.   Care concerns and treatment discussed.  Follow-up recheck in 3 to 4 months, sooner for problems.     (J45.40) Moderate persistent asthma, uncomplicated  Comment:   Plan: fluticasone-salmeterol (ADVAIR DISKUS) 500-50         MCG/DOSE inhaler, montelukast (SINGULAIR) 10 MG        tablet        Stable and doing well on current med's.  Refill sent.  Follow-up in 6  months, sooner if needed          Patient expressed understanding and agreement with treatment plan. All patient's questions were answered, will let me know if has more later.  Medications: Rx's: Reviewed the potential side effects/complications of medications prescribed.     Return in about 3 months (around 5/4/2020), or med check/ return for physical sooner .    Kay Ramirez MD  [facility] [place]

## 2020-02-05 ASSESSMENT — ASTHMA QUESTIONNAIRES: ACT_TOTALSCORE: 25

## 2020-02-16 ENCOUNTER — HEALTH MAINTENANCE LETTER (OUTPATIENT)
Age: 55
End: 2020-02-16

## 2020-04-24 ENCOUNTER — VIRTUAL VISIT (OUTPATIENT)
Dept: FAMILY MEDICINE | Facility: CLINIC | Age: 55
End: 2020-04-24
Payer: COMMERCIAL

## 2020-04-24 DIAGNOSIS — Z79.899 CONTROLLED SUBSTANCE AGREEMENT SIGNED: ICD-10-CM

## 2020-04-24 DIAGNOSIS — F98.8 ATTENTION DEFICIT DISORDER, UNSPECIFIED HYPERACTIVITY PRESENCE: ICD-10-CM

## 2020-04-24 PROCEDURE — 99213 OFFICE O/P EST LOW 20 MIN: CPT | Mod: 95 | Performed by: FAMILY MEDICINE

## 2020-04-24 RX ORDER — DEXTROAMPHETAMINE SACCHARATE, AMPHETAMINE ASPARTATE, DEXTROAMPHETAMINE SULFATE AND AMPHETAMINE SULFATE 1.25; 1.25; 1.25; 1.25 MG/1; MG/1; MG/1; MG/1
5 TABLET ORAL DAILY
Qty: 30 TABLET | Refills: 0 | Status: SHIPPED | OUTPATIENT
Start: 2020-05-04 | End: 2021-06-02

## 2020-04-24 RX ORDER — DEXTROAMPHETAMINE SACCHARATE, AMPHETAMINE ASPARTATE MONOHYDRATE, DEXTROAMPHETAMINE SULFATE AND AMPHETAMINE SULFATE 5; 5; 5; 5 MG/1; MG/1; MG/1; MG/1
20 CAPSULE, EXTENDED RELEASE ORAL DAILY
Qty: 30 CAPSULE | Refills: 0 | Status: SHIPPED | OUTPATIENT
Start: 2020-05-04 | End: 2020-08-18

## 2020-04-24 NOTE — PROGRESS NOTES
"Orlando Holder is a 55 year old male who is being evaluated via a billable telephone visit.      The patient has been notified of following:     \"This telephone visit will be conducted via a call between you and your physician/provider. We have found that certain health care needs can be provided without the need for a physical exam.  This service lets us provide the care you need with a short phone conversation.  If a prescription is necessary we can send it directly to your pharmacy.  If lab work is needed we can place an order for that and you can then stop by our lab to have the test done at a later time.    Telephone visits are billed at different rates depending on your insurance coverage. During this emergency period, for some insurers they may be billed the same as an in-person visit.  Please reach out to your insurance provider with any questions.    If during the course of the call the physician/provider feels a telephone visit is not appropriate, you will not be charged for this service.\"    Patient has given verbal consent for Telephone visit?  Yes    How would you like to obtain your AVS? MyChart    Subjective     Orlando Holder is a 55 year old male who presents to clinic today for the following health issues:    HPI  Medication Followup of Adderall 20 mg     Taking Medication as prescribed: yes    Side Effects:  None    Medication Helping Symptoms:  yes     ADD FOLLOW UP ...    He is due for a follow-up check on his Adderall.  Hhe thinks doing quite well on current medication  Sometimes he skips the dose on the weekend.  Denies any problem taking the medication.  Mood has been fine.  His working is going ok   No new concerns today.  Does need refill on medications    Patient Active Problem List   Diagnosis     Nonspecific abnormal results of liver function study     Difficulty concentrating     Nevus     CARDIOVASCULAR SCREENING; LDL GOAL LESS THAN 160     Erectile dysfunction     Common " wart     Achilles tendonitis     Anal fissure     Obesity     History of colonic polyps     ADD (attention deficit disorder)     Controlled substance agreement signed     Seasonal allergic rhinitis, unspecified allergic rhinitis trigger     Moderate persistent asthma without complication     BMI 38.0-38.9,adult     Obesity, unspecified obesity severity, unspecified obesity type     Abnormal thyroid blood test     Elevated fasting glucose     Morbid obesity (H)     Screening PSA (prostate specific antigen)     Past Surgical History:   Procedure Laterality Date     C NONSPECIFIC PROCEDURE      repair deviated septum     HC REPAIR INCISIONAL HERNIA,REDUCIBLE      Hernia Repair,  inguinal, Unilateral x2 on rt.       Social History     Tobacco Use     Smoking status: Former Smoker     Packs/day: 0.50     Years: 20.00     Pack years: 10.00     Types: Cigarettes     Last attempt to quit: 3/26/2001     Years since quittin.0     Smokeless tobacco: Never Used   Substance Use Topics     Alcohol use: Yes     Alcohol/week: 0.0 standard drinks     Family History   Problem Relation Age of Onset     Hypertension Paternal Grandmother      C.A.D. Paternal Grandmother      Arthritis Paternal Grandmother      Diabetes No family hx of      Prostate Cancer No family hx of      Cancer - colorectal No family hx of            Reviewed and updated as needed this visit by Provider         Review of Systems   ROS COMP: Constitutional, HEENT, cardiovascular, pulmonary, GI, , musculoskeletal, neuro, skin, endocrine and psych systems are negative, except as otherwise noted.               Assessment/Plan:      (F98.8) Attention deficit disorder, unspecified hyperactivity presence  Comment:   Plan: amphetamine-dextroamphetamine (ADDERALL XR) 20         MG 24 hr capsule, amphetamine-dextroamphetamine        (ADDERALL) 5 MG tablet            (Z79.899) Controlled substance agreement signed  Comment:   Plan: amphetamine-dextroamphetamine  (ADDERALL XR) 20         MG 24 hr capsule, amphetamine-dextroamphetamine        (ADDERALL) 5 MG tablet           refill sent.Cares and  treatment discussed.  follow up  In 3-4 months, sooner if problem   Patient expressed understanding and agreement with treatment plan. All patient's questions were answered, will let me know if has more later.  Medications: Rx's: Reviewed the potential side effects/complications of medications prescribed.       Phone call duration:  8 minutes    Kay Ramirez MD

## 2020-07-10 DIAGNOSIS — Z79.899 CONTROLLED SUBSTANCE AGREEMENT SIGNED: ICD-10-CM

## 2020-07-10 DIAGNOSIS — F98.8 ATTENTION DEFICIT DISORDER, UNSPECIFIED HYPERACTIVITY PRESENCE: ICD-10-CM

## 2020-07-10 NOTE — TELEPHONE ENCOUNTER
Last Written Prescription Date:  5/4/2020  Last Fill Quantity: 30,  # refills: 0   Last office visit: 2/4/2020 with prescribing provider:     Future Office Visit:      Requested Prescriptions   Pending Prescriptions Disp Refills     amphetamine-dextroamphetamine (ADDERALL XR) 20 MG 24 hr capsule 30 capsule 0     Sig: Take 1 capsule (20 mg) by mouth daily       There is no refill protocol information for this order        Routing refill request to provider for review/approval because:  Drug not on the [facility] refill protocol       [place] reviewed:  reviewed- no concerns    [place]:  [email or web address]    Kristin French RN, BSN  [facility] [place]

## 2020-07-10 NOTE — TELEPHONE ENCOUNTER
Reason for Call:   medication refill:    Do you use a [facility]?  Name of the pharmacy and phone number for the current request:      [facility] [address]    Name of the medication requested:   amphetamine-dextroamphetamine (ADDERALL XR) 20 MG 24 hr capsule  Other request:   Can we leave a detailed message on this number? YES    Phone number patient can be reached at: Cell number on file:    Telephone Information:   Mobile [phone]       Best Time: any    Call taken on 7/10/2020 at 8:16 AM by Cristiane García

## 2020-07-11 RX ORDER — DEXTROAMPHETAMINE SACCHARATE, AMPHETAMINE ASPARTATE MONOHYDRATE, DEXTROAMPHETAMINE SULFATE AND AMPHETAMINE SULFATE 5; 5; 5; 5 MG/1; MG/1; MG/1; MG/1
20 CAPSULE, EXTENDED RELEASE ORAL DAILY
Qty: 30 CAPSULE | Refills: 0 | Status: SHIPPED | OUTPATIENT
Start: 2020-07-11 | End: 2020-09-16

## 2020-08-17 DIAGNOSIS — F98.8 ATTENTION DEFICIT DISORDER, UNSPECIFIED HYPERACTIVITY PRESENCE: ICD-10-CM

## 2020-08-17 DIAGNOSIS — Z79.899 CONTROLLED SUBSTANCE AGREEMENT SIGNED: ICD-10-CM

## 2020-08-17 NOTE — TELEPHONE ENCOUNTER
Amphetamine/dextroamphetamine   xr 20 mg    Last Written Prescription Date:  7/11/20  Last Fill Quantity: 30,   # refills: 0  Last Office Visit: 4/24/20 kellie Ramirez  Future Office visit:       Routing refill request to provider for review/approval because:  Drug not on the [facility], UMP or  Health refill protocol or controlled substance    RX monitoring program ([place]) reviewed:  not reviewed/not due - last done on 7/10/20    [place]:  [email or web address]    Emma HUFFMAN RN  EP Triage

## 2020-08-17 NOTE — TELEPHONE ENCOUNTER
Medication Question or Refill    Who is calling: Ree blake Perry    What medication are you calling about (include dose and sig)?: adderral    Controlled Substance Agreement on file:     Who prescribed the medication?:     Do you need a refill? Yes: Pt is requesting more than 1 month at a time, she stated he usually gets 2 or 3 months    When did you use the medication last?     Patient offered an appointment?     Do you have any questions or concerns?      [facility] [address]    Okay to leave a detailed message?: Yes at Home number on file [phone] (home)

## 2020-08-18 RX ORDER — DEXTROAMPHETAMINE SACCHARATE, AMPHETAMINE ASPARTATE MONOHYDRATE, DEXTROAMPHETAMINE SULFATE AND AMPHETAMINE SULFATE 5; 5; 5; 5 MG/1; MG/1; MG/1; MG/1
20 CAPSULE, EXTENDED RELEASE ORAL DAILY
Qty: 30 CAPSULE | Refills: 0 | Status: SHIPPED | OUTPATIENT
Start: 2020-08-18 | End: 2020-09-16

## 2020-09-16 ENCOUNTER — VIRTUAL VISIT (OUTPATIENT)
Dept: FAMILY MEDICINE | Facility: CLINIC | Age: 55
End: 2020-09-16
Payer: COMMERCIAL

## 2020-09-16 DIAGNOSIS — Z79.899 CONTROLLED SUBSTANCE AGREEMENT SIGNED: ICD-10-CM

## 2020-09-16 DIAGNOSIS — F98.8 ATTENTION DEFICIT DISORDER, UNSPECIFIED HYPERACTIVITY PRESENCE: ICD-10-CM

## 2020-09-16 PROCEDURE — 99213 OFFICE O/P EST LOW 20 MIN: CPT | Mod: GT | Performed by: FAMILY MEDICINE

## 2020-09-16 RX ORDER — DEXTROAMPHETAMINE SACCHARATE, AMPHETAMINE ASPARTATE MONOHYDRATE, DEXTROAMPHETAMINE SULFATE AND AMPHETAMINE SULFATE 5; 5; 5; 5 MG/1; MG/1; MG/1; MG/1
20 CAPSULE, EXTENDED RELEASE ORAL DAILY
Qty: 30 CAPSULE | Refills: 0 | Status: SHIPPED | OUTPATIENT
Start: 2020-09-17 | End: 2021-02-17

## 2020-09-16 RX ORDER — DEXTROAMPHETAMINE SACCHARATE, AMPHETAMINE ASPARTATE MONOHYDRATE, DEXTROAMPHETAMINE SULFATE AND AMPHETAMINE SULFATE 5; 5; 5; 5 MG/1; MG/1; MG/1; MG/1
20 CAPSULE, EXTENDED RELEASE ORAL DAILY
Qty: 30 CAPSULE | Refills: 0 | Status: SHIPPED | OUTPATIENT
Start: 2020-10-16 | End: 2020-11-12

## 2020-09-16 NOTE — PROGRESS NOTES
"Orlando Holder is a 55 year old male who is being evaluated via a billable video visit.      The patient has been notified of following:     \"This video visit will be conducted via a call between you and your physician/provider. We have found that certain health care needs can be provided without the need for an in-person physical exam.  This service lets us provide the care you need with a video conversation.  If a prescription is necessary we can send it directly to your pharmacy.  If lab work is needed we can place an order for that and you can then stop by our lab to have the test done at a later time.    Video visits are billed at different rates depending on your insurance coverage.  Please reach out to your insurance provider with any questions.    If during the course of the call the physician/provider feels a video visit is not appropriate, you will not be charged for this service.\"    Patient has given verbal consent for Video visit? Yes  How would you like to obtain your AVS? MyChart  If you are dropped from the video visit, the video invite should be resent to: Text to cell phone: [phone]  Will anyone else be joining your video visit? No      Subjective     Orlando Holder is a 55 year old male who presents today via video visit for the following health issues:    HPI    Medication Followup of Adderall 20 mg     Taking Medication as prescribed: yes    Side Effects:  None    Medication Helping Symptoms:  yes           How are you doing with your ADD since your last visit? No change. Willing to continue same  dose of adderal 20 mg XR.      He rarely take his 5 mg dose , so still has few pills and does not refill on that     Are you having other symptoms that might be associated with depression? No    Have you had a significant life event?  No     Are you having any problem taking medication ?   No    Do you have any concerns with your use of alcohol or other drugs? No        Video Start " Time: 11:52 AM            Review of Systems   Constitutional, HEENT, cardiovascular, pulmonary, GI, , musculoskeletal, neuro, skin, endocrine and psych systems are negative, except as otherwise noted.      Objective           Vitals:  No vitals were obtained today due to virtual visit.    Physical Exam     GENERAL: Healthy, alert and no distress  EYES: Eyes grossly normal to inspection.  No discharge or erythema, or obvious scleral/conjunctival abnormalities.  RESP: breathing looks comfortable  No visible retractions or increased work of breathing.    NEURO:  grossly intact.  Mentation and speech appropriate for age.  PSYCH: Mentation appears normal, affect normal/bright, judgement and insight intact, normal speech and appearance well-groomed.              Assessment & Plan     Controlled substance agreement signed    - amphetamine-dextroamphetamine (ADDERALL XR) 20 MG 24 hr capsule; Take 1 capsule (20 mg) by mouth daily  - amphetamine-dextroamphetamine (ADDERALL XR) 20 MG 24 hr capsule; Take 1 capsule (20 mg) by mouth daily    Attention deficit disorder, unspecified hyperactivity presence    - amphetamine-dextroamphetamine (ADDERALL XR) 20 MG 24 hr capsule; Take 1 capsule (20 mg) by mouth daily  - amphetamine-dextroamphetamine (ADDERALL XR) 20 MG 24 hr capsule; Take 1 capsule (20 mg) by mouth daily         Discussed cares, talked about  treatment options. Willing to continue same  dose of adderal 20 mg xr. 2 scripts given.    He rarely take his 5 mg dose , so still has few pills so does not need that.    Pros/ cons of med's discussed . . spent sometimes counseling patient. Follow up in 3-4  months, sooner if problem.         Patient expressed understanding and agreement with treatment plan. All patient's questions were answered, will let me know if has more later.  Medications: Rx's: Reviewed the potential side effects/complications of medications prescribed.         Kay Ramirez MD  [facility]  [place]      Video-Visit Details    Type of service:  Video Visit    Video End Time:12:10  PM    Originating Location (pt. Location): Home    Distant Location (provider location):  [facility]     Platform used for Video Visit: Noelle did not work, so used doximity

## 2020-10-10 DIAGNOSIS — J45.40 MODERATE PERSISTENT ASTHMA, UNCOMPLICATED: ICD-10-CM

## 2020-10-10 DIAGNOSIS — Z79.899 CONTROLLED SUBSTANCE AGREEMENT SIGNED: ICD-10-CM

## 2020-10-12 NOTE — TELEPHONE ENCOUNTER
Filled per FM protocol. Patient has appointment scheduled with provider on 11/11/2020    SONYA Jernigan, RN  [employer] Triage

## 2020-11-12 ENCOUNTER — OFFICE VISIT (OUTPATIENT)
Dept: FAMILY MEDICINE | Facility: CLINIC | Age: 55
End: 2020-11-12
Payer: COMMERCIAL

## 2020-11-12 VITALS
SYSTOLIC BLOOD PRESSURE: 124 MMHG | WEIGHT: 281 LBS | DIASTOLIC BLOOD PRESSURE: 70 MMHG | HEIGHT: 72 IN | TEMPERATURE: 95.4 F | BODY MASS INDEX: 38.06 KG/M2 | OXYGEN SATURATION: 96 % | HEART RATE: 63 BPM

## 2020-11-12 DIAGNOSIS — F98.8 ATTENTION DEFICIT DISORDER, UNSPECIFIED HYPERACTIVITY PRESENCE: ICD-10-CM

## 2020-11-12 DIAGNOSIS — Z86.0100 HISTORY OF COLONIC POLYPS: ICD-10-CM

## 2020-11-12 DIAGNOSIS — J30.89 SEASONAL ALLERGIC RHINITIS DUE TO OTHER ALLERGIC TRIGGER: ICD-10-CM

## 2020-11-12 DIAGNOSIS — Z00.00 ROUTINE GENERAL MEDICAL EXAMINATION AT A HEALTH CARE FACILITY: Primary | ICD-10-CM

## 2020-11-12 DIAGNOSIS — J45.41 MODERATE PERSISTENT ASTHMA WITH EXACERBATION: ICD-10-CM

## 2020-11-12 DIAGNOSIS — Z79.899 CONTROLLED SUBSTANCE AGREEMENT SIGNED: ICD-10-CM

## 2020-11-12 LAB
ALBUMIN SERPL-MCNC: 3.4 G/DL (ref 3.4–5)
ALP SERPL-CCNC: 100 U/L (ref 40–150)
ALT SERPL W P-5'-P-CCNC: 123 U/L (ref 0–70)
ANION GAP SERPL CALCULATED.3IONS-SCNC: 4 MMOL/L (ref 3–14)
AST SERPL W P-5'-P-CCNC: 78 U/L (ref 0–45)
BILIRUB SERPL-MCNC: 1 MG/DL (ref 0.2–1.3)
BUN SERPL-MCNC: 11 MG/DL (ref 7–30)
CALCIUM SERPL-MCNC: 9.1 MG/DL (ref 8.5–10.1)
CHLORIDE SERPL-SCNC: 110 MMOL/L (ref 94–109)
CHOLEST SERPL-MCNC: 171 MG/DL
CO2 SERPL-SCNC: 24 MMOL/L (ref 20–32)
CREAT SERPL-MCNC: 0.58 MG/DL (ref 0.66–1.25)
GFR SERPL CREATININE-BSD FRML MDRD: >90 ML/MIN/{1.73_M2}
GLUCOSE SERPL-MCNC: 107 MG/DL (ref 70–99)
HDLC SERPL-MCNC: 61 MG/DL
LDLC SERPL CALC-MCNC: 89 MG/DL
NONHDLC SERPL-MCNC: 110 MG/DL
POTASSIUM SERPL-SCNC: 3.7 MMOL/L (ref 3.4–5.3)
PROT SERPL-MCNC: 7 G/DL (ref 6.8–8.8)
PSA SERPL-ACNC: 0.42 UG/L (ref 0–4)
SODIUM SERPL-SCNC: 138 MMOL/L (ref 133–144)
TRIGL SERPL-MCNC: 104 MG/DL

## 2020-11-12 PROCEDURE — 80053 COMPREHEN METABOLIC PANEL: CPT | Performed by: FAMILY MEDICINE

## 2020-11-12 PROCEDURE — 99396 PREV VISIT EST AGE 40-64: CPT | Performed by: FAMILY MEDICINE

## 2020-11-12 PROCEDURE — G0103 PSA SCREENING: HCPCS | Performed by: FAMILY MEDICINE

## 2020-11-12 PROCEDURE — 36415 COLL VENOUS BLD VENIPUNCTURE: CPT | Performed by: FAMILY MEDICINE

## 2020-11-12 PROCEDURE — 99214 OFFICE O/P EST MOD 30 MIN: CPT | Mod: 25 | Performed by: FAMILY MEDICINE

## 2020-11-12 PROCEDURE — 80061 LIPID PANEL: CPT | Performed by: FAMILY MEDICINE

## 2020-11-12 RX ORDER — DEXTROAMPHETAMINE SACCHARATE, AMPHETAMINE ASPARTATE MONOHYDRATE, DEXTROAMPHETAMINE SULFATE AND AMPHETAMINE SULFATE 5; 5; 5; 5 MG/1; MG/1; MG/1; MG/1
20 CAPSULE, EXTENDED RELEASE ORAL DAILY
Qty: 30 CAPSULE | Refills: 0 | Status: SHIPPED | OUTPATIENT
Start: 2020-12-16 | End: 2021-02-17

## 2020-11-12 RX ORDER — ALBUTEROL SULFATE 90 UG/1
2 AEROSOL, METERED RESPIRATORY (INHALATION) EVERY 4 HOURS PRN
Qty: 1 INHALER | Refills: 1 | Status: SHIPPED | OUTPATIENT
Start: 2020-11-12 | End: 2020-12-28

## 2020-11-12 RX ORDER — DEXTROAMPHETAMINE SACCHARATE, AMPHETAMINE ASPARTATE MONOHYDRATE, DEXTROAMPHETAMINE SULFATE AND AMPHETAMINE SULFATE 5; 5; 5; 5 MG/1; MG/1; MG/1; MG/1
20 CAPSULE, EXTENDED RELEASE ORAL DAILY
Qty: 30 CAPSULE | Refills: 0 | Status: SHIPPED | OUTPATIENT
Start: 2020-11-16 | End: 2020-11-12

## 2020-11-12 RX ORDER — FLUTICASONE PROPIONATE 50 MCG
1-2 SPRAY, SUSPENSION (ML) NASAL DAILY
Qty: 1 G | Refills: 11 | Status: SHIPPED | OUTPATIENT
Start: 2020-11-12 | End: 2023-04-26

## 2020-11-12 ASSESSMENT — ASTHMA QUESTIONNAIRES
QUESTION_2 LAST FOUR WEEKS HOW OFTEN HAVE YOU HAD SHORTNESS OF BREATH: NOT AT ALL
QUESTION_1 LAST FOUR WEEKS HOW MUCH OF THE TIME DID YOUR ASTHMA KEEP YOU FROM GETTING AS MUCH DONE AT WORK, SCHOOL OR AT HOME: NONE OF THE TIME
QUESTION_3 LAST FOUR WEEKS HOW OFTEN DID YOUR ASTHMA SYMPTOMS (WHEEZING, COUGHING, SHORTNESS OF BREATH, CHEST TIGHTNESS OR PAIN) WAKE YOU UP AT NIGHT OR EARLIER THAN USUAL IN THE MORNING: NOT AT ALL
QUESTION_4 LAST FOUR WEEKS HOW OFTEN HAVE YOU USED YOUR RESCUE INHALER OR NEBULIZER MEDICATION (SUCH AS ALBUTEROL): NOT AT ALL
QUESTION_5 LAST FOUR WEEKS HOW WOULD YOU RATE YOUR ASTHMA CONTROL: COMPLETELY CONTROLLED
ACT_TOTALSCORE: 25

## 2020-11-12 ASSESSMENT — ANXIETY QUESTIONNAIRES
7. FEELING AFRAID AS IF SOMETHING AWFUL MIGHT HAPPEN: NOT AT ALL
3. WORRYING TOO MUCH ABOUT DIFFERENT THINGS: NOT AT ALL
2. NOT BEING ABLE TO STOP OR CONTROL WORRYING: NOT AT ALL
1. FEELING NERVOUS, ANXIOUS, OR ON EDGE: NOT AT ALL
5. BEING SO RESTLESS THAT IT IS HARD TO SIT STILL: NOT AT ALL
6. BECOMING EASILY ANNOYED OR IRRITABLE: NOT AT ALL
GAD7 TOTAL SCORE: 0

## 2020-11-12 ASSESSMENT — PATIENT HEALTH QUESTIONNAIRE - PHQ9
5. POOR APPETITE OR OVEREATING: NOT AT ALL
SUM OF ALL RESPONSES TO PHQ QUESTIONS 1-9: 0

## 2020-11-12 ASSESSMENT — MIFFLIN-ST. JEOR: SCORE: 2133.36

## 2020-11-12 NOTE — LETTER
[facility]  11/12/20    Patient: Orlando Holder  YOB: 1965  Medical Record Number: [id number]                                                                  Opioid / Opioid Plus Controlled Substance Agreement    I understand that my care provider has prescribed an opioid (narcotic) controlled substance to help manage my condition(s). I am taking this medicine to help me function or work. I know this is strong medicine, and that it can cause serious side effects. Opioid medicine can be sedating, addicting and may cause a dependency on the drug. They can affect my ability to drive or think, and cause depression. They need to be taken exactly as prescribed. Combining opioids with certain medicines or chemicals (such as cocaine, sedatives and tranquilizers, sleeping pills, meth) can be dangerous or even fatal. Also, if I stop opioids suddenly, I may have severe withdrawal symptoms. Last, I understand that opioids do not work for all types of pain nor for all patients. If not helpful, I may be asked to stop them.    I am also being prescribed a stimulant controlled substance to help manage symptoms of attention deficit / fatigue, etc.    The risks, benefits, and side effects of these medicine(s) were explained to me. I agree that:    1. I will take part in other treatments as advised by my care team. This may be psychiatry or counseling, physical therapy, behavioral therapy, group treatment or a referral to a pain clinic. I will reduce or stop my medicine when my care team tells me to do so.  2. I will take my medicines as prescribed. I will not change the dose or schedule unless my care team tells me to. There will be no refills if I  run out early.   I may be contactedwithout warning and asked to complete a urine drug test or pill count at any time.   3. I will keep all my appointments, and understand this is part of the monitoring of opioids. My care team may require  an office visit for EVERY opioid/controlled substance refill. If I miss appointments or don t follow instructions, my care team may stop my medicine.  4. I will not ask other providers to prescribe controlled substances, and I will not accept controlled substances from other people. If I need another prescribed controlled substance for a new reason, I will tell my care team within 1 business day.  5. I will use one pharmacy to fill all of my controlled substance prescriptions, and it is up to me to make sure that I do not run out of my medicines on weekends or holidays. If my care team is willing to refill my opioid prescription without a visit, I must request refills only during office hours, refills may take up to 3 days to process, and it may take up to 5 to 7 days for my medicine to be mailed and ready at my pharmacy. Prescriptions will not be mailed anywhere except my pharmacy.        [id number]  Rev 12/18         Registration to scan to EHR                             Page 1 of 2               Controlled Substance Agreement [facility]  11/12/20  Patient: Orlando Holder  YOB: 1965  Medical Record Number: [id number]                                                                  6. I am responsible for my prescriptions. If the medicine/prescription is lost or stolen, it will not be replaced. I also agree not to share controlled substance medicines with anyone.  7. I agree to not use ANY illegal or recreational drugs. This includes marijuana, cocaine, bath salts or other drugs. I agree not to use alcohol unless my care team says I may.          I agree to give urine samples whenever asked. If I don t give a urine sample, the care team may stop my medicine.    8. If I enroll in the [place] Medical Marijuana program, I will tell my care team. I will also sign an agreement to share my medical records with my care team.   9. I will bring in my list of  medicines (or my medicine bottles) each time I come to the clinic.   10. I will tell my care team right away if I become pregnant or have a new medical problem treated outside of my regular clinic.  11. I understand that this medicine can affect my thinking and judgment. It may be unsafe for me to drive, use machinery and do dangerous tasks. I will not do any of these things until I know how the medicine affects me. If my dose changes, I will wait to see how it affects me. I will contact my care team if I have concerns about medicine side effects.    I understand that if I do not follow any of the conditions above, my prescriptions or treatment may be stopped.      I agree that my provider, clinic care team, and pharmacy may work with any city, state or federal law enforcement agency that investigates the misuse, sale, or other diversion of my controlled medicine. I will allow my provider to discuss my care with or share a copy of this agreement with any other treating provider, pharmacy or emergency room where I receive care. I agree to give up (waive) any right of privacy or confidentiality with respect to these consents.     I have read this agreement and have asked questions about anything I did not understand.      ________________________________________________________________________  Patient signature - Date/Time -  Orlando Holder                                      ________________________________________________________________________  Witness signature                                                            ________________________________________________________________________  Provider signature - Kay Ramirez MD      [id number]  Rev 12/18         Registration to scan to EHR                         Page 2 of 2                   Controlled Substance Agreement Opioid           Page 1 of 2  Opioid Pain Medicines (also known as Narcotics)  What You Need to Know    What are opioids?    Opioids are pain medicines that must be prescribed by a doctor.  They are also known as narcotics.    Examples are:     morphine (MS Contin, Nae)    oxycodone (Oxycontin)    oxycodone and acetaminophen (Percocet)    hydrocodone and acetaminophen (Vicodin, Norco)     fentanyl patch (Duragesic)     hydromorphone (Dilaudid)     methadone     What do opioids do well?   Opioids are best for short-term pain after a surgery or injury. They also work well for cancer pain. Unlike other pain medicines, they do not cause liver or kidney failure or ulcers. They may help some people with long-lasting (chronic) pain.     What do opioids NOT do well?   Opioids never get rid of pain entirely, and they do not work well for most patients with chronic pain. Opioids do not reduce swelling, one of the causes of pain. They also don t work well for nerve pain.                           For informational purposes only.  Not to replace the advice of your care provider.  Copyright [facility]. All right reserved. [employer] [id number] 02/18.      Page 2 of 2    Risks and side effects   Talk to your doctor before you start or decide to keep taking one of these medicines. Side effects include:    Lowering your breathing rate enough to cause death    Overdose, including death, especially if taking higher than prescribed doses    Long-term opioid use    Worse depression symptoms; less pleasure in things you usually enjoy    Feeling tired or sluggish    Slower thoughts or cloudy thinking    Being more sensitive to pain over time; pain is harder to control    Trouble sleeping or restless sleep    Changes in hormone levels (for example, less testosterone)    Changes in sex drive or ability to have sex    Constipation    Unsafe driving    Itching and sweating    Feeling dizzy    Nausea, vomiting and dry mouth    What else should I know about opioids?  When someone takes opioids for too long or too often, they become dependent.  This means that if you stop or reduce the medicine too quickly, you will have withdrawal symptoms.    Dependence is not the same as addiction. Addiction is when people keep using a substance that harms their body, their mind or their relations with others. If you have a history of drug or alcohol abuse, taking opioids can cause a relapse.    Over time, opioids don t work as well. Most people will need higher and higher doses. The higher the dose, the more serious the side effects. We don t know the long-term effects of opioids.      Prescribed opioids aren't the best way to manage chronic pain    Other ways to manage pain include:      Ibuprofen or acetaminophen.  You should always try this first.      Treat health problems that may be causing pain.      acupuncture or massage, deep breathing, meditation, visual imagery, aromatherapy.      Use heat or ice at the pain site      Physical therapy and exercise      Stop smoking      See a counselor or therapist                                                  People who have used opioids for a long time may have a lower quality of life, worse depression, higher levels of pain and more visits to doctors.    Never share your opioids with others. Be sure to store opioids in a secure place, locked if possible.Young children can easily swallow them and overdose.     You can overdose on opioids.  Signs of overdose include decrease or loss of consciousness, slowed breathing, trouble waking and blue lips.  If someone is worried about overdose, they should call 911.    If you are at risk for overdose, you may get naloxone (Narcan, a medicine that reverses the effects of opioids.  If you overdose, a friend or family member can give you Narcan while waiting for the ambulance.  They need to know the signs of overdose and how to give Narcan.    While you're taking opioids:    Don't use alcohol or street drugs. Taking them together can cause death.    Don't take any of these  medicines unless your doctor says its okay.  Taking these with opioids can cause death.    Benzodiazepines (such as lorazepam         or diazepam)    Muscle relaxers (such as cyclobenzaprine)    sleeping pills    other opioids    Safe disposal of opioids  Find your area drug take-back program, your pharmacy mail-back program, buy a special disposal bag (such as Deterra) from your pharmacy or flush them down the toilet.  Use the guidelines at:  www.fda.gov/drugs/resourcesforyou

## 2020-11-12 NOTE — PROGRESS NOTES
SUBJECTIVE:   CC: Orlando Holder is an 55 year old male who presents for preventative health visit.       Patient has been advised of split billing requirements and indicates understanding: Yes  Healthy Habits:     Getting at least 3 servings of Calcium per day:  Yes    Bi-annual eye exam:  Yes    Dental care twice a year:  NO    Sleep apnea or symptoms of sleep apnea:  Sleep apnea    Diet:  Regular (no restrictions)    Frequency of exercise:  4-5 days/week    Duration of exercise:  30-45 minutes    Taking medications regularly:  Yes    Medication side effects:  None    PHQ-2 Total Score: 0    Additional concerns today:  No          PROBLEMS TO ADD ON...  ADHD  Follow-Up    When were you diagnosed with ADD for a long time doing well on current med's     How are you doing with your ADD since your last visit? No change    Are you having other symptoms that might be associated with depression? No    Have you had a significant life event?  No     Are you having any problem taking medication ?   No    Do you have any concerns with your use of alcohol or other drugs? No          Social History     Tobacco Use     Smoking status: Former Smoker     Packs/day: 0.50     Years: 20.00     Pack years: 10.00     Types: Cigarettes     Quit date: 3/26/2001     Years since quittin.6     Smokeless tobacco: Never Used   Substance Use Topics     Alcohol use: Yes     Alcohol/week: 0.0 standard drinks     Drug use: No     No flowsheet data found.  No flowsheet data found.  No flowsheet data found.  No flowsheet data found.    Suicide Assessment Five-step Evaluation and Treatment (SAFE-T)    Asthma Follow-Up    Was ACT completed today?    Yes  , doing well on current med's   ACT Total Scores 2020   ACT TOTAL SCORE -   ASTHMA ER VISITS -   ASTHMA HOSPITALIZATIONS -   ACT TOTAL SCORE (Goal Greater than or Equal to 20) 25   In the past 12 months, how many times did you visit the emergency room for your asthma without being  admitted to the hospital? 0   In the past 12 months, how many times were you hospitalized overnight because of your asthma? 0          How many days per week do you miss taking your asthma controller medication?  I do  have an asthma controller medication    Please describe any recent triggers for your asthma: None    Have you had any Emergency Room Visits, Urgent Care Visits, or Hospital Admissions since your last office visit?  No      Today's PHQ-2 Score:   PHQ-2 (  [employer]) 2020   Q1: Little interest or pleasure in doing things 0   Q2: Feeling down, depressed or hopeless 0   PHQ-2 Score 0   Q1: Little interest or pleasure in doing things Not at all   Q2: Feeling down, depressed or hopeless Not at all   PHQ-2 Score 0       Abuse: Current or Past(Physical, Sexual or Emotional)- No  Do you feel safe in your environment? Yes    Have you ever done Advance Care Planning? (For example, a Health Directive, POLST, or a discussion with a medical provider or your loved ones about your wishes): NO    Social History     Tobacco Use     Smoking status: Former Smoker     Packs/day: 0.50     Years: 20.00     Pack years: 10.00     Types: Cigarettes     Quit date: 3/26/2001     Years since quittin.6     Smokeless tobacco: Never Used   Substance Use Topics     Alcohol use: Yes     Alcohol/week: 0.0 standard drinks         Alcohol Use 2020   Prescreen: >3 drinks/day or >7 drinks/week? No   Prescreen: >3 drinks/day or >7 drinks/week? -       Last PSA:   PSA   Date Value Ref Range Status   2018 0.39 0 - 4 ug/L Final     Comment:     Assay Method:  Chemiluminescence using Siemens Vista analyzer       Reviewed orders with patient. Reviewed health maintenance and updated orders accordingly - Yes  Patient Active Problem List   Diagnosis     Nonspecific abnormal results of liver function study     Difficulty concentrating     Nevus     CARDIOVASCULAR SCREENING; LDL GOAL LESS THAN 160     Erectile dysfunction      Common wart     Achilles tendonitis     Anal fissure     Obesity     History of colonic polyps     ADD (attention deficit disorder)     Controlled substance agreement signed     Seasonal allergic rhinitis, unspecified allergic rhinitis trigger     Moderate persistent asthma without complication     BMI 38.0-38.9,adult     Obesity, unspecified obesity severity, unspecified obesity type     Abnormal thyroid blood test     Elevated fasting glucose     Morbid obesity (H)     Screening PSA (prostate specific antigen)     Past Surgical History:   Procedure Laterality Date     HC REPAIR INCISIONAL HERNIA,REDUCIBLE      Hernia Repair,  inguinal, Unilateral x2 on rt.     ZZC NONSPECIFIC PROCEDURE      repair deviated septum       Social History     Tobacco Use     Smoking status: Former Smoker     Packs/day: 0.50     Years: 20.00     Pack years: 10.00     Types: Cigarettes     Quit date: 3/26/2001     Years since quittin.6     Smokeless tobacco: Never Used   Substance Use Topics     Alcohol use: Yes     Alcohol/week: 0.0 standard drinks     Family History   Problem Relation Age of Onset     Hypertension Paternal Grandmother      C.A.D. Paternal Grandmother      Arthritis Paternal Grandmother      Diabetes No family hx of      Prostate Cancer No family hx of      Cancer - colorectal No family hx of            Reviewed and updated as needed this visit by clinical staff  Tobacco  Allergies  Meds              Reviewed and updated as needed this visit by Provider                Past Medical History:   Diagnosis Date     Allergic rhinitis, cause unspecified      Contact dermatitis and other eczema, due to unspecified cause     nummular/asteatotic     Herpes zoster 2015    right ear and neck     Moderate persistent asthma      Unspecified sinusitis (chronic)      Unspecified sleep apnea     C-Pap        Review of Systems  CONSTITUTIONAL: NEGATIVE for fever, chills, change in weight  INTEGUMENTARY/SKIN: NEGATIVE  "for worrisome rashes, moles or lesions  EYES: NEGATIVE for vision changes or irritation  ENT: NEGATIVE for ear, mouth and throat problems  RESP: NEGATIVE for significant cough or SOB  CV: NEGATIVE for chest pain, palpitations or peripheral edema  GI: NEGATIVE for nausea, abdominal pain, heartburn, or change in bowel habits   male: negative for dysuria, hematuria, decreased urinary stream, erectile dysfunction, urethral discharge  MUSCULOSKELETAL: NEGATIVE for significant arthralgias or myalgia  NEURO: NEGATIVE for weakness, dizziness or paresthesias  ENDOCRINE: NEGATIVE for temperature intolerance, skin/hair changes  HEME/ALLERGY/IMMUNE: NEGATIVE for bleeding problems  PSYCHIATRIC: NEGATIVE for changes in mood or affect    OBJECTIVE:   /70   Pulse 63   Temp 95.4  F (35.2  C) (Tympanic)   Ht 1.822 m (5' 11.73\")   Wt 127.5 kg (281 lb)   SpO2 96%   BMI 38.40 kg/m      Physical Exam  GENERAL: healthy, alert and no distress  EYES: Eyes grossly normal to inspection, PERRL and conjunctivae and sclerae normal  HENT: ear canals and TM's normal, nose and mouth without ulcers or lesions  NECK: no adenopathy, no asymmetry, masses, or scars and thyroid normal to palpation  RESP: lungs clear to auscultation - no rales, rhonchi or wheezes  CV: regular rate and rhythm, normal S1 S2, no S3 or S4, no murmur,   ABDOMEN: soft, nontender, no hepatosplenomegaly, no masses and bowel sounds normal   (male): testicles normal without atrophy or masses, no hernias and penis normal without urethral discharge  RECTAL: normal sphincter tone, no rectal masses and prostate of normal size for age, smooth, nontender without masses/nodules  MS: no gross musculoskeletal defects noted, no edema  SKIN: no suspicious lesions or rashes  NEURO: Normal strength and tone, mentation intact and speech normal  PSYCH: mentation appears normal, affect normal/bright        ASSESSMENT/PLAN:   (Z00.00) Routine general medical examination at [facility]" care facility  (primary encounter diagnosis)  Comment:   Plan: GASTROENTEROLOGY ADULT REF PROCEDURE ONLY,         Lipid panel reflex to direct LDL Fasting, PSA,         screen, Comprehensive metabolic panel            (J45.41) Moderate persistent asthma with exacerbation  Comment: stable   Plan: albuterol (PROAIR HFA) 108 (90 Base) MCG/ACT         inhaler            (J30.89) Seasonal allergic rhinitis due to other allergic trigger  Comment: need refill   Plan: fluticasone (FLONASE) 50 MCG/ACT nasal spray            (Z86.010) History of colonic polyps  Comment:   Plan: GASTROENTEROLOGY ADULT REF PROCEDURE ONLY            (Z79.899) Controlled substance agreement signed  Comment: gave 2 months prescription   Plan: amphetamine-dextroamphetamine (ADDERALL XR) 20         MG 24 hr capsule,          amphetamine-dextroamphetamine (ADDERALL XR) 20         MG 24 hr capsule            (F98.8) Attention deficit disorder, unspecified hyperactivity presence  Comment:   Plan: amphetamine-dextroamphetamine (ADDERALL XR) 20         MG 24 hr capsule, DISCONTINUED:         amphetamine-dextroamphetamine (ADDERALL XR) 20         MG 24 hr capsule          Discussed cares,  treatment options. Willing to continue. Pros/ cons of med's discussed .gave 2 months script. spent sometimes counseling patient. Follow up in 3-4 months, sooner if problem.     Check labs. refill sent.Cares and  treatment discussed. follow up if problem   Patient expressed understanding and agreement with treatment plan. All patient's questions were answered, will let me know if has more later.  Medications: Rx's: Reviewed the potential side effects/complications of medications prescribed.       COUNSELING:   Reviewed preventive health counseling, as reflected in patient instructions       Regular exercise       Healthy diet/nutrition       Vision screening       Hearing screening       Immunizations    Declined: Zoster due to Other will consider later                 "Colon cancer screening       Prostate cancer screening    Estimated body mass index is 38.4 kg/m  as calculated from the following:    Height as of this encounter: 1.822 m (5' 11.73\").    Weight as of this encounter: 127.5 kg (281 lb).     Weight management plan: Discussed healthy diet and exercise guidelines    He reports that he quit smoking about 19 years ago. His smoking use included cigarettes. He has a 10.00 pack-year smoking history. He has never used smokeless tobacco.      Counseling Resources:  ATP IV Guidelines  Pooled Cohorts Equation Calculator  FRAX Risk Assessment  ICSI Preventive Guidelines  Dietary Guidelines for Americans, 2010  USDA's MyPlate  ASA Prophylaxis  Lung CA Screening    Kay Ramirez MD  [facility]  "

## 2020-11-12 NOTE — LETTER
[facility]  11/12/20    Patient: Orlando Holder  YOB: 1965  Medical Record Number: [id number]                                                                  Opioid / Opioid Plus Controlled Substance Agreement    I understand that my care provider has prescribed an opioid (narcotic) controlled substance to help manage my condition(s). I am taking this medicine to help me function or work. I know this is strong medicine, and that it can cause serious side effects. Opioid medicine can be sedating, addicting and may cause a dependency on the drug. They can affect my ability to drive or think, and cause depression. They need to be taken exactly as prescribed. Combining opioids with certain medicines or chemicals (such as cocaine, sedatives and tranquilizers, sleeping pills, meth) can be dangerous or even fatal. Also, if I stop opioids suddenly, I may have severe withdrawal symptoms. Last, I understand that opioids do not work for all types of pain nor for all patients. If not helpful, I may be asked to stop them.        The risks, benefits, and side effects of these medicine(s) were explained to me. I agree that:    1. I will take part in other treatments as advised by my care team. This may be psychiatry or counseling, physical therapy, behavioral therapy, group treatment or a referral to a pain clinic. I will reduce or stop my medicine when my care team tells me to do so.  2. I will take my medicines as prescribed. I will not change the dose or schedule unless my care team tells me to. There will be no refills if I  run out early.   I may be contactedwithout warning and asked to complete a urine drug test or pill count at any time.   3. I will keep all my appointments, and understand this is part of the monitoring of opioids. My care team may require an office visit for EVERY opioid/controlled substance refill. If I miss appointments or don t follow instructions, my  care team may stop my medicine.  4. I will not ask other providers to prescribe controlled substances, and I will not accept controlled substances from other people. If I need another prescribed controlled substance for a new reason, I will tell my care team within 1 business day.  5. I will use one pharmacy to fill all of my controlled substance prescriptions, and it is up to me to make sure that I do not run out of my medicines on weekends or holidays. If my care team is willing to refill my opioid prescription without a visit, I must request refills only during office hours, refills may take up to 3 days to process, and it may take up to 5 to 7 days for my medicine to be mailed and ready at my pharmacy. Prescriptions will not be mailed anywhere except my pharmacy.        [id number]  Rev 12/18         Registration to scan to EHR                             Page 1 of 2               Controlled Substance Agreement [facility]  11/12/20  Patient: Orlando Holder  YOB: 1965  Medical Record Number: [id number]                                                                  6. I am responsible for my prescriptions. If the medicine/prescription is lost or stolen, it will not be replaced. I also agree not to share controlled substance medicines with anyone.  7. I agree to not use ANY illegal or recreational drugs. This includes marijuana, cocaine, bath salts or other drugs. I agree not to use alcohol unless my care team says I may.          I agree to give urine samples whenever asked. If I don t give a urine sample, the care team may stop my medicine.    8. If I enroll in the [place] Medical Marijuana program, I will tell my care team. I will also sign an agreement to share my medical records with my care team.   9. I will bring in my list of medicines (or my medicine bottles) each time I come to the clinic.   10. I will tell my care team right away if I become  pregnant or have a new medical problem treated outside of my regular clinic.  11. I understand that this medicine can affect my thinking and judgment. It may be unsafe for me to drive, use machinery and do dangerous tasks. I will not do any of these things until I know how the medicine affects me. If my dose changes, I will wait to see how it affects me. I will contact my care team if I have concerns about medicine side effects.    I understand that if I do not follow any of the conditions above, my prescriptions or treatment may be stopped.      I agree that my provider, clinic care team, and pharmacy may work with any city, state or federal law enforcement agency that investigates the misuse, sale, or other diversion of my controlled medicine. I will allow my provider to discuss my care with or share a copy of this agreement with any other treating provider, pharmacy or emergency room where I receive care. I agree to give up (waive) any right of privacy or confidentiality with respect to these consents.     I have read this agreement and have asked questions about anything I did not understand.      ________________________________________________________________________  Patient signature - Date/Time -  Orlando Holder                                      ________________________________________________________________________  Witness signature                                                            ________________________________________________________________________  Provider signature - Kay Ramirez MD      [id number]  Rev 12/18         Registration to scan to EHR                         Page 2 of 2                   Controlled Substance Agreement Opioid           Page 1 of 2  Opioid Pain Medicines (also known as Narcotics)  What You Need to Know    What are opioids?   Opioids are pain medicines that must be prescribed by a doctor.  They are also known as narcotics.    Examples are:      morphine (MS Contin, Nae)    oxycodone (Oxycontin)    oxycodone and acetaminophen (Percocet)    hydrocodone and acetaminophen (Vicodin, Norco)     fentanyl patch (Duragesic)     hydromorphone (Dilaudid)     methadone     What do opioids do well?   Opioids are best for short-term pain after a surgery or injury. They also work well for cancer pain. Unlike other pain medicines, they do not cause liver or kidney failure or ulcers. They may help some people with long-lasting (chronic) pain.     What do opioids NOT do well?   Opioids never get rid of pain entirely, and they do not work well for most patients with chronic pain. Opioids do not reduce swelling, one of the causes of pain. They also don t work well for nerve pain.                           For informational purposes only.  Not to replace the advice of your care provider.  Copyright [facility]. All right reserved. [employer] 406208-Wka 02/18.      Page 2 of 2    Risks and side effects   Talk to your doctor before you start or decide to keep taking one of these medicines. Side effects include:    Lowering your breathing rate enough to cause death    Overdose, including death, especially if taking higher than prescribed doses    Long-term opioid use    Worse depression symptoms; less pleasure in things you usually enjoy    Feeling tired or sluggish    Slower thoughts or cloudy thinking    Being more sensitive to pain over time; pain is harder to control    Trouble sleeping or restless sleep    Changes in hormone levels (for example, less testosterone)    Changes in sex drive or ability to have sex    Constipation    Unsafe driving    Itching and sweating    Feeling dizzy    Nausea, vomiting and dry mouth    What else should I know about opioids?  When someone takes opioids for too long or too often, they become dependent. This means that if you stop or reduce the medicine too quickly, you will have withdrawal symptoms.    Dependence is  not the same as addiction. Addiction is when people keep using a substance that harms their body, their mind or their relations with others. If you have a history of drug or alcohol abuse, taking opioids can cause a relapse.    Over time, opioids don t work as well. Most people will need higher and higher doses. The higher the dose, the more serious the side effects. We don t know the long-term effects of opioids.      Prescribed opioids aren't the best way to manage chronic pain    Other ways to manage pain include:      Ibuprofen or acetaminophen.  You should always try this first.      Treat health problems that may be causing pain.      acupuncture or massage, deep breathing, meditation, visual imagery, aromatherapy.      Use heat or ice at the pain site      Physical therapy and exercise      Stop smoking      See a counselor or therapist                                                  People who have used opioids for a long time may have a lower quality of life, worse depression, higher levels of pain and more visits to doctors.    Never share your opioids with others. Be sure to store opioids in a secure place, locked if possible.Young children can easily swallow them and overdose.     You can overdose on opioids.  Signs of overdose include decrease or loss of consciousness, slowed breathing, trouble waking and blue lips.  If someone is worried about overdose, they should call 911.    If you are at risk for overdose, you may get naloxone (Narcan, a medicine that reverses the effects of opioids.  If you overdose, a friend or family member can give you Narcan while waiting for the ambulance.  They need to know the signs of overdose and how to give Narcan.    While you're taking opioids:    Don't use alcohol or street drugs. Taking them together can cause death.    Don't take any of these medicines unless your doctor says its okay.  Taking these with opioids can cause death.    Benzodiazepines (such as  lorazepam         or diazepam)    Muscle relaxers (such as cyclobenzaprine)    sleeping pills    other opioids    Safe disposal of opioids  Find your area drug take-back program, your pharmacy mail-back program, buy a special disposal bag (such as Deterra) from your pharmacy or flush them down the toilet.  Use the guidelines at:  www.fda.gov/drugs/resourcesforyou

## 2020-11-13 ASSESSMENT — ANXIETY QUESTIONNAIRES: GAD7 TOTAL SCORE: 0

## 2020-11-13 ASSESSMENT — ASTHMA QUESTIONNAIRES: ACT_TOTALSCORE: 25

## 2020-11-23 ENCOUNTER — TELEPHONE (OUTPATIENT)
Dept: FAMILY MEDICINE | Facility: CLINIC | Age: 55
End: 2020-11-23

## 2020-11-23 ENCOUNTER — VIRTUAL VISIT (OUTPATIENT)
Dept: FAMILY MEDICINE | Facility: CLINIC | Age: 55
End: 2020-11-23
Payer: COMMERCIAL

## 2020-11-23 DIAGNOSIS — R79.89 LFT ELEVATION: Primary | ICD-10-CM

## 2020-11-23 PROCEDURE — 99213 OFFICE O/P EST LOW 20 MIN: CPT | Mod: TEL | Performed by: FAMILY MEDICINE

## 2020-11-23 NOTE — TELEPHONE ENCOUNTER
Appointment has already been changed to a telephone visit.    CAMELIA BrittonN, RN  [employer] [facility]

## 2020-11-23 NOTE — TELEPHONE ENCOUNTER
General Call:     Who is calling:  Pt     Reason for Call:  PT has an appt with Dr. Ramirez @ 4 pm today - He is not have the pain any more to talk about a hernia , but would still like to go over other things. - Pt is happy to come in for a face - face appt with Dr. Ramirez, But is not  if   Would like to change it to a video visit . Plz see what Dr. Ramirez would like to do - If PT does not hear back he will come in    What are your questions or concerns:      Date of last appointment with provider:     Okay to leave a detailed message:Yes at Cell number on file:    Telephone Information:   Mobile [phone]

## 2020-11-23 NOTE — PROGRESS NOTES
"Orlando Holder is a 55 year old male who is being evaluated via a billable telephone visit.      The patient has been notified of following:     \"This telephone visit will be conducted via a call between you and your physician/provider. We have found that certain health care needs can be provided without the need for a physical exam.  This service lets us provide the care you need with a short phone conversation.  If a prescription is necessary we can send it directly to your pharmacy.  If lab work is needed we can place an order for that and you can then stop by our lab to have the test done at a later time.    Telephone visits are billed at different rates depending on your insurance coverage. During this emergency period, for some insurers they may be billed the same as an in-person visit.  Please reach out to your insurance provider with any questions.    If during the course of the call the physician/provider feels a telephone visit is not appropriate, you will not be charged for this service.\"    Patient has given verbal consent for Telephone visit?  Yes    What phone number would you like to be contacted at? [phone]    How would you like to obtain your AVS? Tjhart    Subjective     Orlando Holder is a 55 year old male who presents via phone visit today for the following health issues:    HPI     Concern - test results  Onset: 11/12/2020  Description: follow up test results   Intensity:   Progression of Symptoms:    Accompanying Signs & Symptoms: None, but had elevated LFT during recent visit, so wanted to do follow up check . Average alcohol use was almost daily one drink each night, weekend 2-3 , although he has stopped drinking since last labs over a month ago. He had taken OTC tylenol on and off previously bc of knee problem although he has stopped taking tylenol and he also had injection so pain is also better. He otherwise feels well   Previous history of similar problem: "   Precipitating factors:        Worsened by:   Alleviating factors:        Improved by:   Therapies tried and outcome: None        Review of Systems   Constitutional, HEENT, cardiovascular, pulmonary, GI, , musculoskeletal, neuro, skin, endocrine and psych systems are negative, except as otherwise noted.       Objective          Vitals:  No vitals were obtained today due to virtual visit.    healthy, alert and no distress  PSYCH: Alert and oriented times 3; coherent speech, normal   rate and volume, able to articulate logical thoughts, able   to abstract reason, no tangential thoughts, no hallucinations   or delusions  His affect is normal  RESP: No cough, no audible wheezing, able to talk in full sentences  Remainder of exam unable to be completed due to telephone visits            Assessment/Plan:    (R79.89) LFT elevation  (primary encounter diagnosis)  Comment:   Plan: Hepatic panel (Albumin, ALT, AST, Bili, Alk         Phos, TP)           Liver tests are mildly  Abnormal. Discussed possible differential diagnosis of elevated LFT . clinically he is doing ok, so he is comfortable watching    Ok to watch and return to clinic for a retest in 2-3  weeks  In the meantime reminded him to  avoid alcohol  and OTC tylenol etc. Also talked about low  fat diet, to protect  Liver( his lipid has been very good)       Check labs. refill sent.Cares and  Concerns  Discussed.  follow up if problem   Patient expressed understanding and agreement with treatment plan. All patient's questions were answered, will let me know if has more later.          Phone call duration:  18  minutes

## 2020-12-02 ENCOUNTER — TELEPHONE (OUTPATIENT)
Dept: FAMILY MEDICINE | Facility: CLINIC | Age: 55
End: 2020-12-02

## 2020-12-02 DIAGNOSIS — Z11.59 ENCOUNTER FOR SCREENING FOR OTHER VIRAL DISEASES: Primary | ICD-10-CM

## 2020-12-02 NOTE — TELEPHONE ENCOUNTER
Prior Authorization Retail Medication Request    Medication/Dose: amphetamine-dextroamphetamine (ADDERALL XR) 20 MG 24 hr capsule  ICD code (if different than what is on RX):    Previously Tried and Failed:    Rationale:      Insurance Name:  NA  Insurance ID:  [id number]      Pharmacy Information (if different than what is on RX)  Name:  same  Phone:  [phone]

## 2020-12-02 NOTE — TELEPHONE ENCOUNTER
PA Initiation    Medication: amphetamine-dextroamphetamine (ADDERALL XR) 20 MG 24 hr capsule  Insurance Company: [employer] ([facility]) - Phone [phone] Fax [phone]  Pharmacy Filling the Rx: [id number] - [place], [place] - [address]  Filling Pharmacy Phone: [phone]  Filling Pharmacy Fax: [phone]  Start Date: 12/2/2020

## 2020-12-03 NOTE — TELEPHONE ENCOUNTER
Prior Authorization Approval    Authorization Effective Date: 12/2/2020  Authorization Expiration Date: 12/2/2021  Medication: amphetamine-dextroamphetamine (ADDERALL XR) 20 MG 24 hr capsule--APPROVED  Approved Dose/Quantity:   Reference #: [id number]   Insurance Company: [employer] ([facility]) - Phone [phone] Fax [phone]  Expected CoPay:       CoPay Card Available:      Foundation Assistance Needed:    Which Pharmacy is filling the prescription (Not needed for infusion/clinic administered): [id number] - [place], [place] - [address]  Pharmacy Notified: Yes  Patient Notified: Yes **Instructed pharmacy to notify patient when script is ready to /ship.**      Pharmacy was able to process both IR and ER formulations.

## 2020-12-22 DIAGNOSIS — Z79.899 CONTROLLED SUBSTANCE AGREEMENT SIGNED: ICD-10-CM

## 2020-12-22 DIAGNOSIS — J45.40 MODERATE PERSISTENT ASTHMA, UNCOMPLICATED: ICD-10-CM

## 2020-12-23 DIAGNOSIS — Z79.899 CONTROLLED SUBSTANCE AGREEMENT SIGNED: ICD-10-CM

## 2020-12-23 DIAGNOSIS — J45.40 MODERATE PERSISTENT ASTHMA, UNCOMPLICATED: ICD-10-CM

## 2020-12-23 RX ORDER — MONTELUKAST SODIUM 10 MG/1
TABLET ORAL
Qty: 90 TABLET | Refills: 1 | Status: SHIPPED | OUTPATIENT
Start: 2020-12-23 | End: 2021-06-02

## 2020-12-23 NOTE — TELEPHONE ENCOUNTER
Reason for Call:  Medication or medication refill:    Do you use a [facility]?  Name of the pharmacy and phone number for the current request:  [id number] - [place] - [address]  [phone]    Name of the medication requested: WIXELA INHUB 500-50 MCG/DOSE inhaler    Other request: Patient states that the pharmacy has contacted the clinic multiple times to get this refilled but PCP had ended up sending a different medication.  Patient is requesting this specifically and seemed upset that it was not refilled    Can we leave a detailed message on this number? YES    Phone number patient can be reached at: Home number on file [phone] (home)    Best Time: Anytime    Call taken on 12/23/2020 at 1:07 PM by Sandra Alonzo

## 2020-12-24 NOTE — TELEPHONE ENCOUNTER
Prescription approved per [facility] Refill Protocol.    Emma HUFFMAN RN  EP Triage

## 2020-12-28 ENCOUNTER — OFFICE VISIT (OUTPATIENT)
Dept: FAMILY MEDICINE | Facility: CLINIC | Age: 55
End: 2020-12-28
Payer: COMMERCIAL

## 2020-12-28 VITALS
BODY MASS INDEX: 39.01 KG/M2 | TEMPERATURE: 98.3 F | HEIGHT: 72 IN | SYSTOLIC BLOOD PRESSURE: 122 MMHG | HEART RATE: 77 BPM | DIASTOLIC BLOOD PRESSURE: 78 MMHG | OXYGEN SATURATION: 96 % | WEIGHT: 288 LBS

## 2020-12-28 DIAGNOSIS — J30.89 SEASONAL ALLERGIC RHINITIS DUE TO OTHER ALLERGIC TRIGGER: Primary | ICD-10-CM

## 2020-12-28 DIAGNOSIS — J45.41 MODERATE PERSISTENT ASTHMA WITH EXACERBATION: ICD-10-CM

## 2020-12-28 PROCEDURE — 99214 OFFICE O/P EST MOD 30 MIN: CPT | Performed by: FAMILY MEDICINE

## 2020-12-28 RX ORDER — PREDNISONE 20 MG/1
20 TABLET ORAL 2 TIMES DAILY
Qty: 10 TABLET | Refills: 0 | Status: SHIPPED | OUTPATIENT
Start: 2020-12-28 | End: 2021-01-02

## 2020-12-28 RX ORDER — ALBUTEROL SULFATE 90 UG/1
2 AEROSOL, METERED RESPIRATORY (INHALATION) EVERY 4 HOURS PRN
Qty: 1 INHALER | Refills: 1 | Status: SHIPPED | OUTPATIENT
Start: 2020-12-28 | End: 2021-09-16

## 2020-12-28 ASSESSMENT — MIFFLIN-ST. JEOR: SCORE: 2169.36

## 2020-12-28 NOTE — PROGRESS NOTES
Subjective     Orlando Holder is a 55 year old male who presents to clinic today for the following health issues:    HPI         Asthma Follow-Up    Was ACT completed today?  No    He missed using his Advair last 2 weeks as he ran out , so his asthma has  flared up .   He finally picked up and used it last few day but still feeling some chest tightness, sob , some cough  mostly dry cough.   But no other URI  sx , no fever or chills. No covid exposure etc . Has allergies but using Singulair, Flonase and it helps allergies well controlled , although cold weather can affect his asthma too    Do you have a cough?  YES    Are you experiencing any wheezing in your chest?  YES    Do you have any shortness of breath?  No     How often are you using a short-acting (rescue) inhaler or nebulizer, such as Albuterol?  Every 4 hours    How many days per week do you miss taking your asthma controller medication?  0 missed a week because ran out     Please describe any recent triggers for your asthma: cold air and weather changes, snow     Have you had any Emergency Room Visits, Urgent Care Visits, or Hospital Admissions since your last office visit?  No      How many servings of fruits and vegetables do you eat daily?  4 or more    On average, how many sweetened beverages do you drink each day (Examples: soda, juice, sweet tea, etc.  Do NOT count diet or artificially sweetened beverages)?   0    How many days per week do you exercise enough to make your heart beat faster? 4    How many minutes a day do you exercise enough to make your heart beat faster? 30 - 60    How many days per week do you miss taking your medication? 0        Review of Systems   Constitutional, HEENT, cardiovascular, pulmonary, GI, , musculoskeletal, neuro, skin, endocrine and psych systems are negative, except as otherwise noted.      Objective    There were no vitals taken for this visit.  There is no height or weight on file to calculate  BMI.  Physical Exam   GENERAL: healthy, alert and no distress  EYES: Eyes grossly normal to inspection, PERRL and conjunctivae and sclerae normal  HENT: ear canals and TM's normal, oropharynx clear and oral mucous membranes moist  NECK: no adenopathy and no asymmetry, masses, or scars  RESP: lungs clear to auscultation - no rales, rhonchi has occasional wheezes slight prolong expiration   CV: regular rate and rhythm, normal S1 S2, no S3 or S4, no murmur, click or rub, no peripheral edema and peripheral pulses strong  ABDOMEN: soft, nontender, no hepatosplenomegaly, no masses and bowel sounds normal  MS: no edema            Assessment & Plan         (J45.41) Moderate persistent asthma with exacerbation  Comment:   Plan: albuterol (PROAIR HFA) 108 (90 Base) MCG/ACT         inhaler, predniSONE (DELTASONE) 20 MG tablet            (J30.89) Seasonal allergic rhinitis due to other allergic trigger  (primary encounter diagnosis)  Comment:  Plan:     - albuterol (PROAIR HFA) 108 (90 Base) MCG/ACT inhaler; Inhale 2 puffs into the lungs every 4 hours as needed  - predniSONE (DELTASONE) 20 MG tablet; Take 1 tablet (20 mg) by mouth 2 times daily for 5 days        Discussed allergies/asthma, med's / management and cares. Gave oral prednisone x 3-5 days , hope he should improve   continue with Advair, Singulair and Flonase    also gave refill on  albuterol  inhalers to use as needed     Need to watch the need for albuterol  Inhaler.   follow up if needing frequently or any worsening .Talked about warning S/S for which should be seen urgently     script sent.Cares and  treatment discussed.  follow up if problem   Patient expressed understanding and agreement with treatment plan. All patient's questions were answered, will let me know if has more later.  Medications: Rx's: Reviewed the potential side effects/complications of medications prescribed.       Kay Ramirez MD  [facility]

## 2020-12-30 DIAGNOSIS — Z11.59 ENCOUNTER FOR SCREENING FOR OTHER VIRAL DISEASES: Primary | ICD-10-CM

## 2021-01-11 DIAGNOSIS — Z11.59 ENCOUNTER FOR SCREENING FOR OTHER VIRAL DISEASES: ICD-10-CM

## 2021-01-11 PROCEDURE — U0005 INFEC AGEN DETEC AMPLI PROBE: HCPCS | Performed by: SPECIALIST

## 2021-01-11 PROCEDURE — U0003 INFECTIOUS AGENT DETECTION BY NUCLEIC ACID (DNA OR RNA); SEVERE ACUTE RESPIRATORY SYNDROME CORONAVIRUS 2 (SARS-COV-2) (CORONAVIRUS DISEASE [COVID-19]), AMPLIFIED PROBE TECHNIQUE, MAKING USE OF HIGH THROUGHPUT TECHNOLOGIES AS DESCRIBED BY CMS-2020-01-R: HCPCS | Performed by: SPECIALIST

## 2021-01-11 PROCEDURE — 36416 COLLJ CAPILLARY BLOOD SPEC: CPT | Performed by: SPECIALIST

## 2021-01-12 LAB
SARS-COV-2 RNA RESP QL NAA+PROBE: NOT DETECTED
SPECIMEN SOURCE: NORMAL

## 2021-01-14 ENCOUNTER — HOSPITAL ENCOUNTER (OUTPATIENT)
Facility: CLINIC | Age: 56
Discharge: HOME | End: 2021-01-14
Attending: SPECIALIST | Admitting: SPECIALIST
Payer: COMMERCIAL

## 2021-01-14 VITALS
SYSTOLIC BLOOD PRESSURE: 160 MMHG | TEMPERATURE: 97.5 F | HEART RATE: 63 BPM | BODY MASS INDEX: 38.25 KG/M2 | WEIGHT: 282 LBS | DIASTOLIC BLOOD PRESSURE: 91 MMHG | RESPIRATION RATE: 10 BRPM | OXYGEN SATURATION: 96 %

## 2021-01-14 LAB — COLONOSCOPY: NORMAL

## 2021-01-14 PROCEDURE — 88305 TISSUE EXAM BY PATHOLOGIST: CPT | Mod: 26 | Performed by: PATHOLOGY

## 2021-01-14 PROCEDURE — 88305 TISSUE EXAM BY PATHOLOGIST: CPT | Mod: TC | Performed by: SPECIALIST

## 2021-01-14 PROCEDURE — G0500 MOD SEDAT ENDO SERVICE >5YRS: HCPCS | Performed by: SPECIALIST

## 2021-01-14 PROCEDURE — 250N000011 HC RX IP 250 OP 636: Performed by: SPECIALIST

## 2021-01-14 PROCEDURE — 258N000003 HC RX IP 258 OP 636: Performed by: SPECIALIST

## 2021-01-14 PROCEDURE — 45385 COLONOSCOPY W/LESION REMOVAL: CPT | Performed by: SPECIALIST

## 2021-01-14 RX ORDER — FENTANYL CITRATE 50 UG/ML
INJECTION, SOLUTION INTRAMUSCULAR; INTRAVENOUS PRN
Status: DISCONTINUED | OUTPATIENT
Start: 2021-01-14 | End: 2021-01-14 | Stop reason: HOSPADM

## 2021-01-14 RX ORDER — SODIUM CHLORIDE 9 MG/ML
INJECTION, SOLUTION INTRAVENOUS CONTINUOUS PRN
Status: DISCONTINUED | OUTPATIENT
Start: 2021-01-14 | End: 2021-01-14 | Stop reason: HOSPADM

## 2021-01-14 RX ORDER — ONDANSETRON 2 MG/ML
4 INJECTION INTRAMUSCULAR; INTRAVENOUS
Status: DISCONTINUED | OUTPATIENT
Start: 2021-01-14 | End: 2021-01-14 | Stop reason: HOSPADM

## 2021-01-14 RX ORDER — LIDOCAINE 40 MG/G
CREAM TOPICAL
Status: DISCONTINUED | OUTPATIENT
Start: 2021-01-14 | End: 2021-01-14 | Stop reason: HOSPADM

## 2021-01-14 NOTE — H&P
Pre-Endoscopy History and Physical     Orlando Holder MRN# [id number]   YOB: 1965 Age: 55 year old     Date of Procedure: 1/14/2021  Primary care provider: Kay Ramirez  Type of Endoscopy: Colonoscopy with possible biopsy, possible polypectomy  Reason for Procedure: history of ppolyps  Type of Anesthesia Anticipated: Conscious Sedation    HPI:    Orlando is a 55 year old male who will be undergoing the above procedure.      A history and physical has been performed. The patient's medications and allergies have been reviewed. The risks and benefits of the procedure and the sedation options and risks were discussed with the patient.  All questions were answered and informed consent was obtained.      He denies a personal or family history of anesthesia complications or bleeding disorders.     Patient Active Problem List   Diagnosis     LFT elevation     Difficulty concentrating     Nevus     CARDIOVASCULAR SCREENING; LDL GOAL LESS THAN 160     Erectile dysfunction     Common wart     Achilles tendonitis     Anal fissure     Obesity     History of colonic polyps     ADD (attention deficit disorder)     Controlled substance agreement signed     Seasonal allergic rhinitis, unspecified allergic rhinitis trigger     Moderate persistent asthma without complication     BMI 38.0-38.9,adult     Obesity, unspecified obesity severity, unspecified obesity type     Abnormal thyroid blood test     Elevated fasting glucose     Morbid obesity (H)     Screening PSA (prostate specific antigen)        Past Medical History:   Diagnosis Date     Allergic rhinitis, cause unspecified      Contact dermatitis and other eczema, due to unspecified cause     nummular/asteatotic     Herpes zoster 6/2015    right ear and neck     Moderate persistent asthma      Unspecified sinusitis (chronic)      Unspecified sleep apnea     C-Pap        Past Surgical History:   Procedure Laterality Date     HC REPAIR INCISIONAL  HERNIA,REDUCIBLE      Hernia Repair,  inguinal, Unilateral x2 on rt.     VASECTOMY       ZZC NONSPECIFIC PROCEDURE  2004    repair deviated septum       Social History     Tobacco Use     Smoking status: Former Smoker     Packs/day: 0.50     Years: 20.00     Pack years: 10.00     Types: Cigarettes     Quit date: 3/26/2001     Years since quittin.8     Smokeless tobacco: Never Used   Substance Use Topics     Alcohol use: Yes     Alcohol/week: 0.0 standard drinks     Comment: 2 a week       Family History   Problem Relation Age of Onset     Hypertension Paternal Grandmother      C.A.D. Paternal Grandmother      Arthritis Paternal Grandmother      Diabetes No family hx of      Prostate Cancer No family hx of      Cancer - colorectal No family hx of        Prior to Admission medications    Medication Sig Start Date End Date Taking? Authorizing Provider   albuterol (PROAIR HFA) 108 (90 Base) MCG/ACT inhaler Inhale 2 puffs into the lungs every 4 hours as needed 20  Yes Kay Ramirez MD   amphetamine-dextroamphetamine (ADDERALL XR) 20 MG 24 hr capsule Take 1 capsule (20 mg) by mouth daily 20  Yes Kay Ramirez MD   fluticasone (FLONASE) 50 MCG/ACT nasal spray Spray 1-2 sprays into both nostrils daily 20  Yes Kay Ramirez MD   fluticasone-salmeterol (WIXELA INHUB) 500-50 MCG/DOSE inhaler TAKE 1 PUFF BY MOUTH TWICE A DAY 20  Yes Kay Ramirez MD   montelukast (SINGULAIR) 10 MG tablet TAKE 1 TABLET BY MOUTH EVERYDAY AT BEDTIME 20  Yes Kay Ramirez MD   MULTI-VITAMIN OR TABS 1 tab q  day 04  Yes Uzair English MD   WAL-ITIN 10 MG OR TABS 1 TABLET DAILY 09  Yes Suhail Tavares MD   albuterol (PROAIR HFA/PROVENTIL HFA/VENTOLIN HFA) 108 (90 Base) MCG/ACT inhaler Inhale 2 puffs into the lungs every 6 hours as needed for shortness of breath / dyspnea or wheezing 19   Kay Ramirez MD   amphetamine-dextroamphetamine  (ADDERALL XR) 20 MG 24 hr capsule Take 1 capsule (20 mg) by mouth daily 12/16/20   Kay Ramirez MD   amphetamine-dextroamphetamine (ADDERALL) 5 MG tablet Take 1 tablet (5 mg) by mouth daily 5/4/20   Kay Ramirez MD   fish oil-omega-3 fatty acids (FISH OIL) 1000 MG capsule Take 2 g by mouth daily.    Reported, Patient   Glucosamine-Chondroit-Vit C-Mn (GLUCOSAMINE 1500 COMPLEX PO) Take 1 tablet by mouth daily.    Reported, Patient       Allergies   Allergen Reactions     No Known Drug Allergies         REVIEW OF SYSTEMS:   5 point ROS negative except as noted above in HPI, including Gen., Resp., CV, GI &  system review.    PHYSICAL EXAM:   BP (!) 141/92   Temp 97.5  F (36.4  C)   Resp 16   Wt 127.9 kg (282 lb)   SpO2 96%   BMI 38.25 kg/m   Estimated body mass index is 38.25 kg/m  as calculated from the following:    Height as of 12/28/20: 1.829 m (6').    Weight as of this encounter: 127.9 kg (282 lb).   GENERAL APPEARANCE: alert, and oriented  MENTAL STATUS: alert  AIRWAY EXAM: Mallampatti Class II (visualization of the soft palate, fauces, and uvula)  RESP: lungs clear to auscultation - no rales, rhonchi or wheezes  CV: regular rates and rhythm  DIAGNOSTICS:    Not indicated    IMPRESSION   ASA Class 2 - Mild systemic disease    PLAN:   Plan for Colonoscopy with possible biopsy, possible polypectomy. We discussed the risks, benefits and alternatives and the patient wished to proceed.    The above has been forwarded to the consulting provider.      Signed Electronically by: Tony Chaudhry MD  January 14, 2021

## 2021-01-15 LAB — COPATH REPORT: NORMAL

## 2021-01-17 NOTE — RESULT ENCOUNTER NOTE
Assessment: Adenomatous polyps are benign, but have malignant potential. This increases the risk of colon cancer and impacts the frequency of colonoscopy. Based on the updated polypectomy surveillance recommendations, individuals with 1-2 adenomas <10 mm should undergo surveillance colonoscopy in 7-10 years. Since the colon prep was excellent and the only polyp found was 3 mm in diameter, the 10 year interval seems appropriate.     Recommendations: Surveillance colonoscopy in ten years (2031).  First degree relatives of patients with adenomatous polyps are at increased risk of developing adenomas and colorectal cancer; screening colonoscopy is recommended.

## 2021-02-16 DIAGNOSIS — Z79.899 CONTROLLED SUBSTANCE AGREEMENT SIGNED: ICD-10-CM

## 2021-02-16 DIAGNOSIS — F98.8 ATTENTION DEFICIT DISORDER, UNSPECIFIED HYPERACTIVITY PRESENCE: ICD-10-CM

## 2021-02-17 RX ORDER — DEXTROAMPHETAMINE SACCHARATE, AMPHETAMINE ASPARTATE MONOHYDRATE, DEXTROAMPHETAMINE SULFATE AND AMPHETAMINE SULFATE 5; 5; 5; 5 MG/1; MG/1; MG/1; MG/1
20 CAPSULE, EXTENDED RELEASE ORAL DAILY
Qty: 30 CAPSULE | Refills: 0 | Status: SHIPPED | OUTPATIENT
Start: 2021-02-17 | End: 2021-06-23

## 2021-02-17 RX ORDER — DEXTROAMPHETAMINE SACCHARATE, AMPHETAMINE ASPARTATE MONOHYDRATE, DEXTROAMPHETAMINE SULFATE AND AMPHETAMINE SULFATE 5; 5; 5; 5 MG/1; MG/1; MG/1; MG/1
20 CAPSULE, EXTENDED RELEASE ORAL DAILY
Qty: 30 CAPSULE | Refills: 0 | Status: SHIPPED | OUTPATIENT
Start: 2021-02-17 | End: 2021-03-23

## 2021-02-17 NOTE — TELEPHONE ENCOUNTER
Controlled Substance Refill Request for Adderall  Problem List Complete:  No     PROVIDER TO CONSIDER COMPLETION OF PROBLEM LIST AND OVERVIEW/CONTROLLED SUBSTANCE AGREEMENT    Last Written Prescription Date:  12-16-20  Last Fill Quantity: 30,   # refills: 0    THE MOST RECENT OFFICE VISIT MUST BE WITHIN THE PAST 3 MONTHS. AT LEAST ONE FACE TO FACE VISIT MUST OCCUR EVERY 6 MONTHS. ADDITIONAL VISITS CAN BE VIRTUAL.  (THIS STATEMENT SHOULD BE DELETED.)    Last Office Visit with [facility] [place]: 12-28-20    Future Office visit:     Controlled substance agreement:   Encounter-Level CSA - 12/05/2016:    Controlled Substance Agreement - Scan on 12/6/2016  3:05 PM: CONTROLLED SUBSTANCE AGREEMENT     Patient-Level CSA:    Controlled Substance Agreement - Opioid - Scan on 12/6/2020  3:18 PM         Last Urine Drug Screen: No results found for: CDAUT, No results found for: COMDAT, No results found for: THC13, PCP13, COC13, MAMP13, OPI13, AMP13, BZO13, TCA13, MTD13, BAR13, OXY13, PPX13, BUP13     Processing:  Rx to be electronically transmitted to pharmacy by provider      [email or web address]       checked in past 6 months?  No, route to RN-unable to access.   Routing refill request to provider for review/approval because:  Drug not on the [facility] refill protocol   Raya Parker RN

## 2021-03-11 ENCOUNTER — TELEPHONE (OUTPATIENT)
Dept: FAMILY MEDICINE | Facility: CLINIC | Age: 56
End: 2021-03-11

## 2021-03-11 NOTE — TELEPHONE ENCOUNTER
Wife calling to ask for a list of his dx to bring to the [facility] run vaccine clinic- advised to log onto [employer] and show his AVS    Flor CARDOSORN BSN  [facility]  [phone]

## 2021-03-23 ENCOUNTER — MYC REFILL (OUTPATIENT)
Dept: FAMILY MEDICINE | Facility: CLINIC | Age: 56
End: 2021-03-23

## 2021-03-23 DIAGNOSIS — Z79.899 CONTROLLED SUBSTANCE AGREEMENT SIGNED: ICD-10-CM

## 2021-03-23 DIAGNOSIS — F98.8 ATTENTION DEFICIT DISORDER, UNSPECIFIED HYPERACTIVITY PRESENCE: ICD-10-CM

## 2021-03-23 NOTE — TELEPHONE ENCOUNTER
Controlled Substance Refill Request for   amphetamine-dextroamphetamine (ADDERALL XR) 20 MG 24 hr capsule 30 capsule 0 2/17/2021       Problem List Complete:  No     PROVIDER TO CONSIDER COMPLETION OF PROBLEM LIST AND OVERVIEW/CONTROLLED SUBSTANCE AGREEMENT    Last Written Prescription Date:  2/17/2021  Last Fill Quantity: 30,   # refills: 0    THE MOST RECENT OFFICE VISIT MUST BE WITHIN THE PAST 3 MONTHS. AT LEAST ONE FACE TO FACE VISIT MUST OCCUR EVERY 6 MONTHS. ADDITIONAL VISITS CAN BE VIRTUAL.  (THIS STATEMENT SHOULD BE DELETED.)    Last Office Visit with [facility] [place]: 12/28/2020    Future Office visit:     Controlled substance agreement:   Encounter-Level CSA - 12/05/2016:    Controlled Substance Agreement - Scan on 12/6/2016  3:05 PM: CONTROLLED SUBSTANCE AGREEMENT     Patient-Level CSA:    Controlled Substance Agreement - Opioid - Scan on 12/6/2020  3:18 PM         Last Urine Drug Screen: No results found for: CDAUT, No results found for: COMDAT, No results found for: THC13, PCP13, COC13, MAMP13, OPI13, AMP13, BZO13, TCA13, MTD13, BAR13, OXY13, PPX13, BUP13     Processing:  Rx to be electronically transmitted to pharmacy by provider      [email or web address]

## 2021-03-24 NOTE — TELEPHONE ENCOUNTER
[place] reviewed:  reviewed- no concerns on 3/24/21    [place]  [email or web address]    Emma HUFFMAN RN  EP Triage

## 2021-03-26 RX ORDER — DEXTROAMPHETAMINE SACCHARATE, AMPHETAMINE ASPARTATE MONOHYDRATE, DEXTROAMPHETAMINE SULFATE AND AMPHETAMINE SULFATE 5; 5; 5; 5 MG/1; MG/1; MG/1; MG/1
20 CAPSULE, EXTENDED RELEASE ORAL DAILY
Qty: 30 CAPSULE | Refills: 0 | Status: SHIPPED | OUTPATIENT
Start: 2021-03-26 | End: 2021-04-27

## 2021-03-26 NOTE — TELEPHONE ENCOUNTER
Patient sent separate [employer] message to check on reqeust since there has been no response. Routing to covering provider to review/advise.

## 2021-04-27 ENCOUNTER — MYC REFILL (OUTPATIENT)
Dept: FAMILY MEDICINE | Facility: CLINIC | Age: 56
End: 2021-04-27

## 2021-04-27 DIAGNOSIS — Z79.899 CONTROLLED SUBSTANCE AGREEMENT SIGNED: ICD-10-CM

## 2021-04-27 DIAGNOSIS — F98.8 ATTENTION DEFICIT DISORDER, UNSPECIFIED HYPERACTIVITY PRESENCE: ICD-10-CM

## 2021-04-27 NOTE — TELEPHONE ENCOUNTER
Amphetamine/dextroamphetamine xr 20 mg      Last Written Prescription Date:  3/26/21  Last Fill Quantity: 30,   # refills: 0  Last Office Visit: 12/28/20 James  Future Office visit:       Routing refill request to provider for review/approval because:  Drug not on the [facility], UMP or  Health refill protocol or controlled substance    RX [place]) reviewed:  not reviewed/not due - last done on 3/23/21    [place]:  [email or web address]    Emma HUFFMAN RN  EP Triage

## 2021-04-28 ENCOUNTER — MYC REFILL (OUTPATIENT)
Dept: FAMILY MEDICINE | Facility: CLINIC | Age: 56
End: 2021-04-28

## 2021-04-28 DIAGNOSIS — F98.8 ATTENTION DEFICIT DISORDER, UNSPECIFIED HYPERACTIVITY PRESENCE: ICD-10-CM

## 2021-04-28 DIAGNOSIS — Z79.899 CONTROLLED SUBSTANCE AGREEMENT SIGNED: ICD-10-CM

## 2021-04-28 RX ORDER — DEXTROAMPHETAMINE SACCHARATE, AMPHETAMINE ASPARTATE MONOHYDRATE, DEXTROAMPHETAMINE SULFATE AND AMPHETAMINE SULFATE 5; 5; 5; 5 MG/1; MG/1; MG/1; MG/1
20 CAPSULE, EXTENDED RELEASE ORAL DAILY
Qty: 30 CAPSULE | Refills: 0 | Status: SHIPPED | OUTPATIENT
Start: 2021-04-28 | End: 2021-06-23

## 2021-04-28 RX ORDER — DEXTROAMPHETAMINE SACCHARATE, AMPHETAMINE ASPARTATE MONOHYDRATE, DEXTROAMPHETAMINE SULFATE AND AMPHETAMINE SULFATE 5; 5; 5; 5 MG/1; MG/1; MG/1; MG/1
20 CAPSULE, EXTENDED RELEASE ORAL DAILY
Qty: 30 CAPSULE | Refills: 0 | Status: CANCELLED | OUTPATIENT
Start: 2021-04-28

## 2021-06-02 ENCOUNTER — VIRTUAL VISIT (OUTPATIENT)
Dept: FAMILY MEDICINE | Facility: CLINIC | Age: 56
End: 2021-06-02
Payer: COMMERCIAL

## 2021-06-02 DIAGNOSIS — J30.89 SEASONAL ALLERGIC RHINITIS DUE TO OTHER ALLERGIC TRIGGER: ICD-10-CM

## 2021-06-02 DIAGNOSIS — Z79.899 CONTROLLED SUBSTANCE AGREEMENT SIGNED: ICD-10-CM

## 2021-06-02 DIAGNOSIS — F98.8 ATTENTION DEFICIT DISORDER, UNSPECIFIED HYPERACTIVITY PRESENCE: ICD-10-CM

## 2021-06-02 DIAGNOSIS — J45.40 MODERATE PERSISTENT ASTHMA, UNCOMPLICATED: ICD-10-CM

## 2021-06-02 PROCEDURE — 99214 OFFICE O/P EST MOD 30 MIN: CPT | Mod: 95 | Performed by: FAMILY MEDICINE

## 2021-06-02 RX ORDER — DEXTROAMPHETAMINE SACCHARATE, AMPHETAMINE ASPARTATE, DEXTROAMPHETAMINE SULFATE AND AMPHETAMINE SULFATE 1.25; 1.25; 1.25; 1.25 MG/1; MG/1; MG/1; MG/1
5 TABLET ORAL DAILY
Qty: 30 TABLET | Refills: 0 | Status: SHIPPED | OUTPATIENT
Start: 2021-06-02 | End: 2021-09-16

## 2021-06-02 RX ORDER — MONTELUKAST SODIUM 10 MG/1
10 TABLET ORAL AT BEDTIME
Qty: 90 TABLET | Refills: 1 | Status: SHIPPED | OUTPATIENT
Start: 2021-06-02 | End: 2021-09-16

## 2021-06-02 RX ORDER — DEXTROAMPHETAMINE SACCHARATE, AMPHETAMINE ASPARTATE MONOHYDRATE, DEXTROAMPHETAMINE SULFATE AND AMPHETAMINE SULFATE 5; 5; 5; 5 MG/1; MG/1; MG/1; MG/1
20 CAPSULE, EXTENDED RELEASE ORAL DAILY
Qty: 30 CAPSULE | Refills: 0 | Status: SHIPPED | OUTPATIENT
Start: 2021-07-03 | End: 2021-06-23

## 2021-06-02 RX ORDER — DEXTROAMPHETAMINE SACCHARATE, AMPHETAMINE ASPARTATE MONOHYDRATE, DEXTROAMPHETAMINE SULFATE AND AMPHETAMINE SULFATE 5; 5; 5; 5 MG/1; MG/1; MG/1; MG/1
20 CAPSULE, EXTENDED RELEASE ORAL DAILY
Qty: 30 CAPSULE | Refills: 0 | Status: SHIPPED | OUTPATIENT
Start: 2021-06-02 | End: 2021-09-16

## 2021-06-02 RX ORDER — DEXTROAMPHETAMINE SACCHARATE, AMPHETAMINE ASPARTATE MONOHYDRATE, DEXTROAMPHETAMINE SULFATE AND AMPHETAMINE SULFATE 5; 5; 5; 5 MG/1; MG/1; MG/1; MG/1
20 CAPSULE, EXTENDED RELEASE ORAL DAILY
Qty: 30 CAPSULE | Refills: 0 | Status: SHIPPED | OUTPATIENT
Start: 2021-08-03 | End: 2021-06-23

## 2021-06-02 NOTE — PROGRESS NOTES
Sterling is a 56 year old who is being evaluated via a billable video visit.      How would you like to obtain your AVS? MyChart  If the video visit is dropped, the invitation should be resent by: Text to cell phone: [phone]  Will anyone else be joining your video visit? No    Video- Am well did not work so switched to phone visit     Assessment & Plan         (F98.8) Attention deficit disorder, unspecified hyperactivity presence  Comment:   Plan: amphetamine-dextroamphetamine (ADDERALL) 5 MG         tablet, amphetamine-dextroamphetamine (ADDERALL        XR) 20 MG 24 hr capsule,         amphetamine-dextroamphetamine (ADDERALL XR) 20         MG 24 hr capsule, amphetamine-dextroamphetamine        (ADDERALL XR) 20 MG 24 hr capsule          (Z79.899) Controlled substance agreement signed  Comment: previously signed and it is current   Plan: amphetamine-dextroamphetamine (ADDERALL) 5 MG         tablet, montelukast (SINGULAIR) 10 MG tablet,         fluticasone-salmeterol (WIXELA INHUB) 500-50         MCG/DOSE inhaler          Doing well on current med's script given x 3 . F/u in 3-4 months    (J45.40) Moderate persistent asthma, uncomplicated  Comment: stable   Plan: montelukast (SINGULAIR) 10 MG tablet,         fluticasone-salmeterol (WIXELA INHUB) 500-50         MCG/DOSE inhaler            (J30.89) Seasonal allergic rhinitis due to other allergic trigger  Comment: stable   Plan: continue with same med's     Discussed allergies/asthma, med's / management and symptomatic cares. Gave refill on med's. Need to watch the need for albuterol  Inhaler  follow up if needing frequently.       refill sent.Cares and  treatment discussed. follow up in 4-6 months , sooner  if problem   Patient expressed understanding and agreement with treatment plan. All patient's questions were answered, will let me know if has more later.  Medications: Rx's: Reviewed the potential side effects/complications of medications prescribed.             BMI:   Estimated body mass index is 37.43 kg/m  as calculated from the following:    Height as of an earlier encounter on 21: 1.829 m (6').    Weight as of an earlier encounter on 21: 125.2 kg (276 lb).   Weight management plan: Discussed healthy diet and exercise guidelines    MEDICATIONS:  Continue current medications without change    No follow-ups on file.    Kay Ramirez MD  [facility] LUCIUS Katz is a 56 year old who presents for the following health issues   HPI           When were you diagnosed with ADD.many years ago     How are you doing with your ADD since your last visit? No change    Are you having other symptoms that might be associated with depression? No    Have you had a significant life event?  No     Are you having any problem taking medication ?   No    Do you have any concerns with your use of alcohol or other drugs? No    Social History     Tobacco Use     Smoking status: Former Smoker     Packs/day: 0.50     Years: 20.00     Pack years: 10.00     Types: Cigarettes     Quit date: 3/26/2001     Years since quittin.2     Smokeless tobacco: Never Used   Substance Use Topics     Alcohol use: Yes     Alcohol/week: 0.0 standard drinks     Comment: 2 a week     Drug use: No     PHQ 2020   PHQ-9 Total Score 0   Q9: Thoughts of better off dead/self-harm past 2 weeks Not at all     BUDDY-7 SCORE 2020   Total Score 0     Last PHQ-9 2020   1.  Little interest or pleasure in doing things 0   2.  Feeling down, depressed, or hopeless 0   3.  Trouble falling or staying asleep, or sleeping too much 0   4.  Feeling tired or having little energy 0   5.  Poor appetite or overeating 0   6.  Feeling bad about yourself 0   7.  Trouble concentrating 0   8.  Moving slowly or restless 0   Q9: Thoughts of better off dead/self-harm past 2 weeks 0   PHQ-9 Total Score 0     BUDDY-7  2020   1. Feeling nervous, anxious, or on edge 0   2. Not being  able to stop or control worrying 0   3. Worrying too much about different things 0   4. Trouble relaxing 0   5. Being so restless that it is hard to sit still 0   6. Becoming easily annoyed or irritable 0   7. Feeling afraid, as if something awful might happen 0   BUDDY-7 Total Score 0       Suicide Assessment Five-step Evaluation and Treatment (SAFE-T)      Asthma Follow-Up    Was ACT completed today?    Yes   Doing well on Advair/ Singulair, has not used albuterol in long time. Allergies are also well controlled on current med's   ACT Total Scores 6/2/2021   ACT TOTAL SCORE -   ASTHMA ER VISITS -   ASTHMA HOSPITALIZATIONS -   ACT TOTAL SCORE (Goal Greater than or Equal to 20) 25   In the past 12 months, how many times did you visit the emergency room for your asthma without being admitted to the hospital? 0   In the past 12 months, how many times were you hospitalized overnight because of your asthma? 0          How many days per week do you miss taking your asthma controller medication?  I do  have an asthma controller medication    Please describe any recent triggers for your asthma: None    Have you had any Emergency Room Visits, Urgent Care Visits, or Hospital Admissions since your last office visit?  No        Review of Systems   Constitutional, HEENT, cardiovascular, pulmonary, GI, , musculoskeletal, neuro, skin, endocrine and psych systems are negative, except as otherwise noted.      Objective           Vitals:  No vitals were obtained today due to virtual visit.    Physical Exam   GENERAL: pleasant  alert and no distress  RESP: No audible wheeze, cough, , talking no sentences without problem.   NEURO:.  Mentation and speech appropriate for age.  PSYCH: mentation appears normal, affect-  normal speech  and judgement and insight intact            Video-Visit Details    Type of service:  phone  Visit  Time spent - 22 min       Distant Location (provider location):  [facility]

## 2021-06-23 ENCOUNTER — OFFICE VISIT (OUTPATIENT)
Dept: FAMILY MEDICINE | Facility: CLINIC | Age: 56
End: 2021-06-23
Payer: COMMERCIAL

## 2021-06-23 VITALS
SYSTOLIC BLOOD PRESSURE: 120 MMHG | OXYGEN SATURATION: 97 % | WEIGHT: 276 LBS | TEMPERATURE: 97.1 F | DIASTOLIC BLOOD PRESSURE: 72 MMHG | HEART RATE: 76 BPM | BODY MASS INDEX: 37.43 KG/M2

## 2021-06-23 DIAGNOSIS — J45.41 MODERATE PERSISTENT ASTHMA WITH ACUTE EXACERBATION: Primary | ICD-10-CM

## 2021-06-23 PROCEDURE — 99214 OFFICE O/P EST MOD 30 MIN: CPT | Performed by: INTERNAL MEDICINE

## 2021-06-23 RX ORDER — MONTELUKAST SODIUM 10 MG/1
TABLET ORAL
COMMUNITY
Start: 2020-10-24 | End: 2021-09-16

## 2021-06-23 RX ORDER — PREDNISONE 50 MG/1
50 TABLET ORAL DAILY
Qty: 6 TABLET | Refills: 0 | Status: SHIPPED | OUTPATIENT
Start: 2021-06-23 | End: 2021-09-16

## 2021-06-23 RX ORDER — INFLUENZA A VIRUS A/VICTORIA/2454/2019 IVR-207 (H1N1) ANTIGEN (PROPIOLACTONE INACTIVATED), INFLUENZA A VIRUS A/HONG KONG/2671/2019 IVR-208 (H3N2) ANTIGEN (PROPIOLACTONE INACTIVATED), INFLUENZA B VIRUS B/VICTORIA/705/2018 BVR-11 ANTIGEN (PROPIOLACTONE INACTIVATED), INFLUENZA B VIRUS B/PHUKET/3073/2013 BVR-1B ANTIGEN (PROPIOLACTONE INACTIVATED) 15; 15; 15; 15 UG/.5ML; UG/.5ML; UG/.5ML; UG/.5ML
INJECTION, SUSPENSION INTRAMUSCULAR
COMMUNITY
Start: 2020-10-19

## 2021-06-23 ASSESSMENT — PAIN SCALES - GENERAL: PAINLEVEL: NO PAIN (0)

## 2021-06-23 NOTE — PATIENT INSTRUCTIONS
As discussed , medications for asthma flare up given.     Please let me know how you are doing , if no improvement in 1-2 days will need Nebulizations. Please go to ER for any worsening symptoms.     =====================================    Patient Education     Inhaled Corticosteroids for Asthma Control     Inhaled corticosteroids are often advised for long-term asthma control. These medicines are safe for long-term use. They are not the steroids that you hear about athletes abusing. The normal prescribed doses of corticosteroids don t often cause side effects. That s because they re inhaled right into your lungs, where they re needed. So they have little effect on the rest of your body. The chance of side effects can be even lower if you:     Ask your healthcare provider about using a spacer or holding chamber with your inhaler. These devices help the medicine get to your lungs more easily.    Rinse your mouth and spit out the water after using the steroid inhaler. This simple step will help prevent some side effects.    Work with your provider to find the lowest dose to control your asthma.    Show your provider how you use your inhaler. This is to make sure you are using it correctly.   How to use your inhaler  1. If this is the first time you are using the inhaler, you need to prime it. That means making sure it is ready to use. Follow the  s instructions. Prime the inhaler in the air away from your face.  2. Make sure you are standing up or sitting up straight in a chair. Remove the cap and shake well.  3. Empty your lungs completely by taking a deep breath in and tilting your head back slightly and blowing air out.  4. For the closed-mouth method, put the inhaler mouthpiece in your mouth, past your teeth and above your tongue. Close your lips tightly around the mouthpiece to create a tight seal so the medicine doesn t spray in your eyes. Or, for the open-mouth method,  hold the inhaler up to your  mouth, with the mouthpiece 2 finger-widths away from your lips. Make sure you know how your healthcare provider wants you to hold your inhaler or refer to the package insert of the device. Always keep your inhaler at chin level.  5. Press down on the canister 1 time to release the medicine. At the same time, breathe in deeply and slowly for 3 to 5 seconds.  6. Remove the mouthpiece from your mouth if you are using the closed-mouth method. Or, move it away from your mouth if you are using the open-mouth method. Then, close your lips.  7. Hold your breath for up to 10 seconds if you can. Then breathe out slowly through your mouth.  8. Repeat these steps for each puff of medicine. Wait at least 15 seconds to 1 minute before taking the next puff, or as long as directed by your healthcare provider.  9. If you re using a steroid inhaler, rinse and gargle your mouth with water to prevent thrush, a fungal infection. Spit the water out. Don t swallow the water. Clean your inhaler after every use or at least once a week or as directed by the  of the device.    Medicines and your Asthma Action Plan  Medicines play a key role in controlling your asthma. It s important to use them the right way. Use your Asthma Action Plan as your guide and keep additional notes if you have new or worsening symptoms. Write down what you were doing when the symptoms occurred. Bring the Asthma Action Plan and your notes with you to every appointment. Then you can review and update it with your healthcare provider. And don t stop taking your asthma medicine if you feel better. If you have any questions, ask your provider or pharmacist.   If you don't have an Asthma Action Plan, contact your healthcare right away. Asthma Action Plans should be updated every year, and every time your asthma treatment changes. Refer to your Asthma Action Plan during flare-ups or if your symptoms get worse.     4801-0642 [employer]. All  rights reserved. This information is not intended as a substitute for professional medical care. Always follow your healthcare professional's instructions.

## 2021-06-23 NOTE — LETTER
My Asthma Action Plan    Name: Orlando Holder   YOB: 1965  Date: 6/23/2021   My doctor: Ella Nunez MD   My clinic: [facility] [place]        My Control Medicine: Fluticasone propionate + salmeterol (Advair Diskus or Wixela Inhub) -  500/50 mcg N/A  My Rescue Medicine: Albuterol (Proair/Ventolin/Proventil HFA) 2-4 puffs EVERY 4 HOURS as needed. Use a spacer if recommended by your provider.  My Oral Steroid Medicine: Prednisone My Asthma Severity:   Moderate Persistent  Know your asthma triggers: Dust  dust mites  pollens  humidity  warm air             GREEN ZONE   Good Control    I feel good    No cough or wheeze    Can work, sleep and play without asthma symptoms       Take your asthma control medicine every day.     1. If exercise triggers your asthma, take your rescue medication    15 minutes before exercise or sports, and    During exercise if you have asthma symptoms  2. Spacer to use with inhaler: If you have a spacer, make sure to use it with your inhaler             YELLOW ZONE Getting Worse  I have ANY of these:    I do not feel good    Cough or wheeze    Chest feels tight    Wake up at night   1. Keep taking your Green Zone medications  2. Start taking your rescue medicine:    every 20 minutes for up to 1 hour. Then every 4 hours for 24-48 hours.  3. If you stay in the Yellow Zone for more than 12-24 hours, contact your doctor.  4. If you do not return to the Green Zone in 12-24 hours or you get worse, start taking your oral steroid medicine if prescribed by your provider.           RED ZONE Medical Alert - Get Help  I have ANY of these:    I feel awful    Medicine is not helping    Breathing getting harder    Trouble walking or talking    Nose opens wide to breathe       1. Take your rescue medicine NOW  2. If your provider has prescribed an oral steroid medicine, start taking it NOW  3. Call your doctor NOW  4. If you are still in the Red Zone after 20  minutes and you have not reached your doctor:    Take your rescue medicine again and    Call 911 or go to the emergency room right away    See your regular doctor within 2 weeks of an Emergency Room or Urgent Care visit for follow-up treatment.          Annual Reminders:  Meet with Asthma Educator,  Flu Shot in the Fall, consider Pneumonia Vaccination for patients with asthma (aged 19 and older).    Pharmacy:    [facility] [place] - [place] - [address]  [facility] [place]  [id number] [place] [address]  [facility] - [place], [place] - [facility], [address]  [id number] [address] AT [facility]    Electronically signed by Ella Nunez MD   Date: 06/23/21                      Asthma Triggers  How To Control Things That Make Your Asthma Worse    Triggers are things that make your asthma worse.  Look at the list below to help you find your triggers and what you can do about them.  You can help prevent asthma flare-ups by staying away from your triggers.      Trigger                                                          What you can do   Cigarette Smoke  Tobacco smoke can make asthma worse. Do not allow smoking in your home, car or around you.  Be sure no one smokes at a child s day care or school.  If you smoke, ask your health care provider for ways to help you quit.  Ask family members to quit too.  Ask your health care provider for a referral to Quit Plan to help you quit smoking, or call 9-692-625-PLAN.     Colds, Flu, Bronchitis  These are common triggers of asthma. Wash your hands often.  Don t touch your eyes, nose or mouth.  Get a flu shot every year.     Dust Mites  These are tiny bugs that live in cloth or carpet. They are too small to see. Wash sheets and blankets in hot water every week.   Encase pillows and mattress in dust mite proof covers.  Avoid having carpet if  you can. If you have carpet, vacuum weekly.   Use a dust mask and HEPA vacuum.   Pollen and Outdoor Mold  Some people are allergic to trees, grass, or weed pollen, or molds. Try to keep your windows closed.  Limit time out doors when pollen count is high.   Ask you health care provider about taking medicine during allergy season.     Animal Dander  Some people are allergic to skin flakes, urine or saliva from pets with fur or feathers. Keep pets with fur or feathers out of your home.    If you can t keep the pet outdoors, then keep the pet out of your bedroom.  Keep the bedroom door closed.  Keep pets off cloth furniture and away from stuffed toys.     Mice, Rats, and Cockroaches   Some people are allergic to the waste from these pests.   Cover food and garbage.  Clean up spills and food crumbs.  Store grease in the refrigerator.   Keep food out of the bedroom.   Indoor Mold  This can be a trigger if your home has high moisture. Fix leaking faucets, pipes, or other sources of water.   Clean moldy surfaces.  Dehumidify basement if it is damp and smelly.   Smoke, Strong Odors, and Sprays  These can reduce air quality. Stay away from strong odors and sprays, such as perfume, powder, hair spray, paints, smoke incense, paint, cleaning products, candles and new carpet.   Exercise or Sports  Some people with asthma have this trigger. Be active!  Ask your doctor about taking medicine before sports or exercise to prevent symptoms.    Warm up for 5-10 minutes before and after sports or exercise.     Other Triggers of Asthma  Cold air:  Cover your nose and mouth with a scarf.  Sometimes laughing or crying can be a trigger.  Some medicines and food can trigger asthma.

## 2021-06-23 NOTE — PROGRESS NOTES
Assessment and Plan  1. Moderate persistent asthma with acute exacerbation  Acute SOB with triggers of allergens as mentioned in HPI - Started 6 days , were in a hotel for last few days for daughter freda and started having cough, wheezes. Currently on Wixela at highest dose, Albuterol as needed and Singulair. Will treat this flareup with steroid and antibiotic at this time. Offered Nebulization machine which pt not opting at this time. ER precautions given in AVS.   - REVIEW OF HEALTH MAINTENANCE PROTOCOL ORDERS  - predniSONE (DELTASONE) 50 MG tablet; Take 1 tablet (50 mg) by mouth daily  Dispense: 6 tablet; Refill: 0  - amoxicillin-clavulanate (AUGMENTIN) 875-125 MG tablet; Take 1 tablet by mouth 2 times daily  Dispense: 14 tablet; Refill: 0     Patient Instructions   As discussed , medications for asthma flare up given.     Please let me know how you are doing , if no improvement in 1-2 days will need Nebulizations. Please go to ER for any worsening symptoms.     =====================================    Patient Education     Inhaled Corticosteroids for Asthma Control     Inhaled corticosteroids are often advised for long-term asthma control. These medicines are safe for long-term use. They are not the steroids that you hear about athletes abusing. The normal prescribed doses of corticosteroids don t often cause side effects. That s because they re inhaled right into your lungs, where they re needed. So they have little effect on the rest of your body. The chance of side effects can be even lower if you:     Ask your healthcare provider about using a spacer or holding chamber with your inhaler. These devices help the medicine get to your lungs more easily.    Rinse your mouth and spit out the water after using the steroid inhaler. This simple step will help prevent some side effects.    Work with your provider to find the lowest dose to control your asthma.    Show your provider how you use your inhaler. This  is to make sure you are using it correctly.   How to use your inhaler  1. If this is the first time you are using the inhaler, you need to prime it. That means making sure it is ready to use. Follow the  s instructions. Prime the inhaler in the air away from your face.  2. Make sure you are standing up or sitting up straight in a chair. Remove the cap and shake well.  3. Empty your lungs completely by taking a deep breath in and tilting your head back slightly and blowing air out.  4. For the closed-mouth method, put the inhaler mouthpiece in your mouth, past your teeth and above your tongue. Close your lips tightly around the mouthpiece to create a tight seal so the medicine doesn t spray in your eyes. Or, for the open-mouth method,  hold the inhaler up to your mouth, with the mouthpiece 2 finger-widths away from your lips. Make sure you know how your healthcare provider wants you to hold your inhaler or refer to the package insert of the device. Always keep your inhaler at chin level.  5. Press down on the canister 1 time to release the medicine. At the same time, breathe in deeply and slowly for 3 to 5 seconds.  6. Remove the mouthpiece from your mouth if you are using the closed-mouth method. Or, move it away from your mouth if you are using the open-mouth method. Then, close your lips.  7. Hold your breath for up to 10 seconds if you can. Then breathe out slowly through your mouth.  8. Repeat these steps for each puff of medicine. Wait at least 15 seconds to 1 minute before taking the next puff, or as long as directed by your healthcare provider.  9. If you re using a steroid inhaler, rinse and gargle your mouth with water to prevent thrush, a fungal infection. Spit the water out. Don t swallow the water. Clean your inhaler after every use or at least once a week or as directed by the  of the device.    Medicines and your Asthma Action Plan  Medicines play a key role in controlling your  asthma. It s important to use them the right way. Use your Asthma Action Plan as your guide and keep additional notes if you have new or worsening symptoms. Write down what you were doing when the symptoms occurred. Bring the Asthma Action Plan and your notes with you to every appointment. Then you can review and update it with your healthcare provider. And don t stop taking your asthma medicine if you feel better. If you have any questions, ask your provider or pharmacist.   If you don't have an Asthma Action Plan, contact your healthcare right away. Asthma Action Plans should be updated every year, and every time your asthma treatment changes. Refer to your Asthma Action Plan during flare-ups or if your symptoms get worse.     5645-6298 [employer]. All rights reserved. This information is not intended as a substitute for professional medical care. Always follow your healthcare professional's instructions.             Return in about 2 weeks (around 7/7/2021), or if symptoms worsen or fail to improve, for Follow up of last visit.    Ella Nunez MD  [facility] [place] is a 56 year old who presents for the following health issues       HPI     Asthma Follow-Up    Was ACT completed today?    Yes    ACT Total Scores 6/23/2021   ACT TOTAL SCORE -   ASTHMA ER VISITS -   ASTHMA HOSPITALIZATIONS -   ACT TOTAL SCORE (Goal Greater than or Equal to 20) 18   In the past 12 months, how many times did you visit the emergency room for your asthma without being admitted to the hospital? 0   In the past 12 months, how many times were you hospitalized overnight because of your asthma? 0         How many days per week do you miss taking your asthma controller medication?  7    Please describe any recent triggers for your asthma: dust mites, pollens, humidity and warm air     Have you had any Emergency Room Visits, Urgent Care Visits, or Hospital Admissions since your  last office visit?  No      How many servings of fruits and vegetables do you eat daily?  4 or more    On average, how many sweetened beverages do you drink each day (Examples: soda, juice, sweet tea, etc.  Do NOT count diet or artificially sweetened beverages)?   0    How many days per week do you exercise enough to make your heart beat faster? 4    How many minutes a day do you exercise enough to make your heart beat faster? 30 - 60    How many days per week do you miss taking your medication? 0       Allergies   Allergen Reactions     No Known Drug Allergies         Past Medical History:   Diagnosis Date     Allergic rhinitis, cause unspecified      Contact dermatitis and other eczema, due to unspecified cause     nummular/asteatotic     Herpes zoster 2015    right ear and neck     Moderate persistent asthma      Unspecified sinusitis (chronic)      Unspecified sleep apnea     C-Pap       Past Surgical History:   Procedure Laterality Date     HC REPAIR INCISIONAL HERNIA,REDUCIBLE      Hernia Repair,  inguinal, Unilateral x2 on rt.     VASECTOMY       ZZC NONSPECIFIC PROCEDURE      repair deviated septum       Family History   Problem Relation Age of Onset     Hypertension Paternal Grandmother      C.A.D. Paternal Grandmother      Arthritis Paternal Grandmother      Diabetes No family hx of      Prostate Cancer No family hx of      Cancer - colorectal No family hx of        Social History     Tobacco Use     Smoking status: Former Smoker     Packs/day: 0.50     Years: 20.00     Pack years: 10.00     Types: Cigarettes     Quit date: 3/26/2001     Years since quittin.2     Smokeless tobacco: Never Used   Substance Use Topics     Alcohol use: Yes     Alcohol/week: 0.0 standard drinks     Comment: 2 a week        Current Outpatient Medications   Medication     albuterol (PROAIR HFA) 108 (90 Base) MCG/ACT inhaler     albuterol (PROAIR HFA/PROVENTIL HFA/VENTOLIN HFA) 108 (90 Base) MCG/ACT inhaler      amoxicillin-clavulanate (AUGMENTIN) 875-125 MG tablet     amphetamine-dextroamphetamine (ADDERALL XR) 20 MG 24 hr capsule     amphetamine-dextroamphetamine (ADDERALL) 5 MG tablet     fish oil-omega-3 fatty acids (FISH OIL) 1000 MG capsule     fluticasone (FLONASE) 50 MCG/ACT nasal spray     fluticasone-salmeterol (WIXELA INHUB) 500-50 MCG/DOSE inhaler     montelukast (SINGULAIR) 10 MG tablet     MULTI-VITAMIN OR TABS     predniSONE (DELTASONE) 50 MG tablet     WAL-ITIN 10 MG OR TABS     AFLURIA QUADRIVALENT 0.5 ML injection     montelukast (SINGULAIR) 10 MG tablet     No current facility-administered medications for this visit.         Review of Systems   Constitutional, HEENT, cardiovascular, pulmonary, GI, , musculoskeletal, neuro, skin, endocrine and psych systems are negative, except as otherwise noted.      Objective    /72   Pulse 76   Temp 97.1  F (36.2  C) (Tympanic)   Wt 125.2 kg (276 lb)   SpO2 97%   BMI 37.43 kg/m    Body mass index is 37.43 kg/m .  Physical Exam   GENERAL: healthy, alert and no distress  NECK: no adenopathy, no asymmetry, masses, or scars and thyroid normal to palpation  RESP: POSITIVE for decreased breath sounds bilaterally and faint wheezes heard.  CV: regular rate and rhythm, normal S1 S2, no S3 or S4, no murmur, click or rub, no peripheral edema and peripheral pulses strong  ABDOMEN: soft, nontender, no hepatosplenomegaly, no masses and bowel sounds normal  MS: no gross musculoskeletal defects noted, no edema

## 2021-06-24 ASSESSMENT — ASTHMA QUESTIONNAIRES: ACT_TOTALSCORE: 18

## 2021-09-16 ENCOUNTER — OFFICE VISIT (OUTPATIENT)
Dept: FAMILY MEDICINE | Facility: CLINIC | Age: 56
End: 2021-09-16
Payer: COMMERCIAL

## 2021-09-16 VITALS
HEART RATE: 57 BPM | RESPIRATION RATE: 12 BRPM | WEIGHT: 278 LBS | DIASTOLIC BLOOD PRESSURE: 80 MMHG | SYSTOLIC BLOOD PRESSURE: 134 MMHG | TEMPERATURE: 97.8 F | BODY MASS INDEX: 37.7 KG/M2 | OXYGEN SATURATION: 99 %

## 2021-09-16 DIAGNOSIS — J45.40 MODERATE PERSISTENT ASTHMA, UNCOMPLICATED: ICD-10-CM

## 2021-09-16 DIAGNOSIS — Z23 NEED FOR PROPHYLACTIC VACCINATION AND INOCULATION AGAINST INFLUENZA: ICD-10-CM

## 2021-09-16 DIAGNOSIS — Z79.899 CONTROLLED SUBSTANCE AGREEMENT SIGNED: ICD-10-CM

## 2021-09-16 DIAGNOSIS — F98.8 ATTENTION DEFICIT DISORDER, UNSPECIFIED HYPERACTIVITY PRESENCE: Primary | ICD-10-CM

## 2021-09-16 PROCEDURE — 99214 OFFICE O/P EST MOD 30 MIN: CPT | Mod: 25 | Performed by: FAMILY MEDICINE

## 2021-09-16 PROCEDURE — 90471 IMMUNIZATION ADMIN: CPT | Performed by: FAMILY MEDICINE

## 2021-09-16 PROCEDURE — 90682 RIV4 VACC RECOMBINANT DNA IM: CPT | Performed by: FAMILY MEDICINE

## 2021-09-16 RX ORDER — ALBUTEROL SULFATE 90 UG/1
2 AEROSOL, METERED RESPIRATORY (INHALATION) EVERY 4 HOURS PRN
Qty: 18 G | Refills: 1 | Status: SHIPPED | OUTPATIENT
Start: 2021-09-16 | End: 2023-10-30

## 2021-09-16 RX ORDER — MONTELUKAST SODIUM 10 MG/1
10 TABLET ORAL AT BEDTIME
Qty: 90 TABLET | Refills: 1 | Status: SHIPPED | OUTPATIENT
Start: 2021-09-16 | End: 2022-02-21

## 2021-09-16 RX ORDER — DEXTROAMPHETAMINE SACCHARATE, AMPHETAMINE ASPARTATE, DEXTROAMPHETAMINE SULFATE AND AMPHETAMINE SULFATE 1.25; 1.25; 1.25; 1.25 MG/1; MG/1; MG/1; MG/1
5 TABLET ORAL DAILY
Qty: 30 TABLET | Refills: 0 | Status: SHIPPED | OUTPATIENT
Start: 2021-09-16 | End: 2021-09-16

## 2021-09-16 RX ORDER — DEXTROAMPHETAMINE SACCHARATE, AMPHETAMINE ASPARTATE, DEXTROAMPHETAMINE SULFATE AND AMPHETAMINE SULFATE 1.25; 1.25; 1.25; 1.25 MG/1; MG/1; MG/1; MG/1
5 TABLET ORAL DAILY
Qty: 30 TABLET | Refills: 0 | Status: SHIPPED | OUTPATIENT
Start: 2021-10-16 | End: 2022-11-09

## 2021-09-16 RX ORDER — DEXTROAMPHETAMINE SACCHARATE, AMPHETAMINE ASPARTATE MONOHYDRATE, DEXTROAMPHETAMINE SULFATE AND AMPHETAMINE SULFATE 5; 5; 5; 5 MG/1; MG/1; MG/1; MG/1
20 CAPSULE, EXTENDED RELEASE ORAL DAILY
Qty: 30 CAPSULE | Refills: 0 | Status: SHIPPED | OUTPATIENT
Start: 2021-09-16 | End: 2021-09-16

## 2021-09-16 RX ORDER — DEXTROAMPHETAMINE SACCHARATE, AMPHETAMINE ASPARTATE MONOHYDRATE, DEXTROAMPHETAMINE SULFATE AND AMPHETAMINE SULFATE 5; 5; 5; 5 MG/1; MG/1; MG/1; MG/1
20 CAPSULE, EXTENDED RELEASE ORAL DAILY
Qty: 30 CAPSULE | Refills: 0 | Status: SHIPPED | OUTPATIENT
Start: 2021-10-16 | End: 2021-12-02

## 2021-09-16 ASSESSMENT — PAIN SCALES - GENERAL: PAINLEVEL: NO PAIN (0)

## 2021-09-16 ASSESSMENT — ASTHMA QUESTIONNAIRES
QUESTION_3 LAST FOUR WEEKS HOW OFTEN DID YOUR ASTHMA SYMPTOMS (WHEEZING, COUGHING, SHORTNESS OF BREATH, CHEST TIGHTNESS OR PAIN) WAKE YOU UP AT NIGHT OR EARLIER THAN USUAL IN THE MORNING: NOT AT ALL
QUESTION_2 LAST FOUR WEEKS HOW OFTEN HAVE YOU HAD SHORTNESS OF BREATH: ONCE OR TWICE A WEEK
QUESTION_1 LAST FOUR WEEKS HOW MUCH OF THE TIME DID YOUR ASTHMA KEEP YOU FROM GETTING AS MUCH DONE AT WORK, SCHOOL OR AT HOME: NONE OF THE TIME
QUESTION_5 LAST FOUR WEEKS HOW WOULD YOU RATE YOUR ASTHMA CONTROL: WELL CONTROLLED
ACT_TOTALSCORE: 22
QUESTION_4 LAST FOUR WEEKS HOW OFTEN HAVE YOU USED YOUR RESCUE INHALER OR NEBULIZER MEDICATION (SUCH AS ALBUTEROL): ONCE A WEEK OR LESS

## 2021-09-16 NOTE — PROGRESS NOTES
Assessment & Plan       (F98.8) Attention deficit disorder, unspecified hyperactivity presence  Comment:   Plan: amphetamine-dextroamphetamine (ADDERALL XR) 20         MG 24 hr capsule, amphetamine-dextroamphetamine        (ADDERALL) 5 MG tablet,          amphetamine-dextroamphetamine (ADDERALL XR) 20         MG 24 hr capsule,         amphetamine-dextroamphetamine (ADDERALL) 5 MG         tablet            (Z79.899) Controlled substance agreement signed  Comment:   Plan: montelukast (SINGULAIR) 10 MG tablet,         fluticasone-salmeterol (WIXELA INHUB) 500-50         MCG/DOSE inhaler, amphetamine-dextroamphetamine        (ADDERALL) 5 MG tablet, D        amphetamine-dextroamphetamine (ADDERALL) 5 MG         tablet            (J45.40) Moderate persistent asthma, uncomplicated  Comment:   Plan: montelukast (SINGULAIR) 10 MG tablet,         fluticasone-salmeterol (WIXELA INHUB) 500-50         MCG/DOSE inhaler            (J45.41) Moderate persistent asthma with exacerbation  Comment:   Plan: albuterol (PROAIR HFA) 108 (90 Base) MCG/ACT         inhaler            (Z23) Need for prophylactic vaccination and inoculation against influenza  (primary encounter diagnosis)  Comment:   Plan: INFLUENZA QUAD, RECOMBINANT, P-FREE (RIV4)         (FLUBLOK)      Refill sent.Cares and  treatment discussed. follow up if problem   Patient expressed understanding and agreement with treatment plan. All patient's questions were answered, will let me know if has more later.  Medications: Rx's: Reviewed the potential side effects/complications of medications prescribed.     Kay Ramirez MD  [facility] is a 56 year old who presents for the following health issues     HPI     Medication Followup of Adderall     Taking Medication as prescribed: yes    Side Effects:  None    Medication Helping Symptoms:  yes      ADD Follow-Up    How are you doing with your ADD  since your last visit? No  change, med's are working well. And no problem taking med's and wants to continue     How are you doing with your anxiety since your last visit?  No change    Are you having other symptoms that might be associated with depression or anxiety? No    Have you had a significant life event? No     Do you have any concerns with your use of alcohol or other drugs? No    Social History     Tobacco Use     Smoking status: Former Smoker     Packs/day: 0.50     Years: 20.00     Pack years: 10.00     Types: Cigarettes     Quit date: 3/26/2001     Years since quittin.4     Smokeless tobacco: Never Used   Substance Use Topics     Alcohol use: Yes     Alcohol/week: 0.0 standard drinks     Comment: 2 a week     Drug use: No     PHQ 2020   PHQ-9 Total Score 0   Q9: Thoughts of better off dead/self-harm past 2 weeks Not at all     BUDDY-7 SCORE 2020   Total Score 0     Last PHQ-9 2020   1.  Little interest or pleasure in doing things 0   2.  Feeling down, depressed, or hopeless 0   3.  Trouble falling or staying asleep, or sleeping too much 0   4.  Feeling tired or having little energy 0   5.  Poor appetite or overeating 0   6.  Feeling bad about yourself 0   7.  Trouble concentrating 0   8.  Moving slowly or restless 0   Q9: Thoughts of better off dead/self-harm past 2 weeks 0   PHQ-9 Total Score 0     BUDDY-7  2020   1. Feeling nervous, anxious, or on edge 0   2. Not being able to stop or control worrying 0   3. Worrying too much about different things 0   4. Trouble relaxing 0   5. Being so restless that it is hard to sit still 0   6. Becoming easily annoyed or irritable 0   7. Feeling afraid, as if something awful might happen 0   BUDDY-7 Total Score 0       Suicide Assessment Five-step Evaluation and Treatment (SAFE-T)    Asthma Follow-Up    Was ACT completed today?    Yes  Doing well on current med's. Rarely needs albuterol.   ACT Total Scores 2021   ACT TOTAL SCORE -   ASTHMA ER VISITS -   ASTHMA  HOSPITALIZATIONS -   ACT TOTAL SCORE (Goal Greater than or Equal to 20) 18   In the past 12 months, how many times did you visit the emergency room for your asthma without being admitted to the hospital? 0   In the past 12 months, how many times were you hospitalized overnight because of your asthma? 0          How many days per week do you miss taking your asthma controller medication?  I do  have  asthma controller medication- Advair and Singulair , working well . Need refill     Please describe any recent triggers for your asthma: None    Have you had any Emergency Room Visits, Urgent Care Visits, or Hospital Admissions since your last office visit?  No        Review of Systems   Constitutional, HEENT, cardiovascular, pulmonary, GI, , musculoskeletal, neuro, skin, endocrine and psych systems are negative, except as otherwise noted.      Objective    /80   Pulse 57   Temp 97.8  F (36.6  C) (Tympanic)   Resp 12   Wt 126.1 kg (278 lb)   SpO2 99%   BMI 37.70 kg/m    Body mass index is 37.7 kg/m .  Physical Exam   GENERAL: healthy, alert and no distress  EYES: Eyes grossly normal to inspection, PERRL and conjunctivae and sclerae normal  NECK: no adenopathy, no asymmetry, masses, or scars and thyroid normal to palpation  RESP: lungs clear to auscultation - no rales, rhonchi or wheezes  CV: regular rate and rhythm, normal S1 S2, no S3 or S4,  ABDOMEN: soft, nontender, no hepatosplenomegaly, no masses and bowel sounds normal  NEURO: Normal strength and tone, mentation intact and speech normal  PSYCH: mentation appears normal, affect normal/bright

## 2021-09-17 ASSESSMENT — ASTHMA QUESTIONNAIRES: ACT_TOTALSCORE: 22

## 2021-11-19 ENCOUNTER — TELEPHONE (OUTPATIENT)
Dept: FAMILY MEDICINE | Facility: CLINIC | Age: 56
End: 2021-11-19
Payer: COMMERCIAL

## 2021-11-19 NOTE — TELEPHONE ENCOUNTER
Prior Authorization Retail Medication Request    Medication/Dose: (RENEWAL) - amphetamine-dextroamphetamine (ADDERALL XR) 20 MG 24 hr capsule  ICD code (if different than what is on RX):  Attention deficit disorder, unspecified hyperactivity presence [F98.8] - Controlled substance agreement signed [Z79.899]  Previously Tried and Failed:   Rationale:      Insurance Name:  [employer]  Insurance ID:  [id number]    Pharmacy Information (if different than what is on RX)  Name:  [id number] - [place] - [address]  Phone:  [phone]

## 2021-11-22 NOTE — TELEPHONE ENCOUNTER
Prior Authorization Not Needed per Insurance    Medication: (RENEWAL) - amphetamine-dextroamphetamine (ADDERALL XR) 20 MG 24 hr capsule  Insurance Company: [employer] - Phone [phone] Fax [phone]  Expected CoPay:      Pharmacy Filling the Rx: [id number] - [place] - [address]  Pharmacy Notified: Yes  Patient Notified: No    Renewal was through [employer] but patient now has [employer]. Drug is covered by current benefit plan. No further PA activity needed

## 2021-11-23 ENCOUNTER — NURSE TRIAGE (OUTPATIENT)
Dept: FAMILY MEDICINE | Facility: CLINIC | Age: 56
End: 2021-11-23
Payer: COMMERCIAL

## 2021-11-23 NOTE — TELEPHONE ENCOUNTER
Nurse Triage SBAR    Is this a 2nd Level Triage? NO    Situation    : Patient started with symptoms on Friday 11/19. States that he did not have a fever. States that he is feeling better today. Main symptom is a stuffy nose.     Background    : Received booster less than a week ago    Assessment   : Positive rapid test today for COVID 19    (See information below for more triage details.)    Protocol Recommended Disposition: home measures. Reviewed quarantine guidelines.       Reason for Disposition    [1] COVID-19 diagnosed by positive lab test AND [2] mild symptoms (e.g., cough, fever, others) AND [3] no complications or SOB    Additional Information    Negative: SEVERE difficulty breathing (e.g., struggling for each breath, speaks in single words)    Negative: Difficult to awaken or acting confused (e.g., disoriented, slurred speech)    Negative: Bluish (or gray) lips or face now    Negative: Shock suspected (e.g., cold/pale/clammy skin, too weak to stand, low BP, rapid pulse)    Negative: Sounds like a life-threatening emergency to the triager    Negative: [1] COVID-19 exposure AND [2] no symptoms    Negative: COVID-19 vaccine reaction suspected (e.g., fever, headache, muscle aches) occurring 1 to 3 days after getting vaccine    Negative: COVID-19 vaccine, questions about    Negative: [1] Lives with someone known to have influenza (flu test positive) AND [2] flu-like symptoms (e.g., cough, runny nose, sore throat, SOB; with or without fever)    Negative: [1] Adult with possible COVID-19 symptoms AND [2] triager concerned about severity of symptoms or other causes    Negative: COVID-19 and breastfeeding, questions about    Negative: SEVERE or constant chest pain or pressure (Exception: mild central chest pain, present only when coughing)    Negative: MODERATE difficulty breathing (e.g., speaks in phrases, SOB even at rest, pulse 100-120)    Negative: [1] Headache AND [2] stiff neck (can't touch chin to chest)     "Negative: MILD difficulty breathing (e.g., minimal/no SOB at rest, SOB with walking, pulse <100)    Negative: Chest pain or pressure    Negative: Patient sounds very sick or weak to the triager    Negative: Fever > 103 F (39.4 C)    Negative: [1] Fever > 101 F (38.3 C) AND [2] age > 60 years    Negative: [1] Fever > 100.0 F (37.8 C) AND [2] bedridden (e.g., nursing home patient, CVA, chronic illness, recovering from surgery)    Negative: HIGH RISK for severe COVID complications (e.g., age > 64 years, obesity with BMI > 25, pregnant, chronic lung disease or other chronic medical condition)  (Exception: Already seen by PCP and no new or worsening symptoms.)    Negative: [1] HIGH RISK patient AND [2] influenza is widespread in the community AND [3] ONE OR MORE respiratory symptoms: cough, sore throat, runny or stuffy nose    Negative: [1] HIGH RISK patient AND [2] influenza exposure within the last 7 days AND [3] ONE OR MORE respiratory symptoms: cough, sore throat, runny or stuffy nose    Negative: [1] COVID-19 infection suspected by caller or triager AND [2] mild symptoms (cough, fever, or others) AND [3] negative COVID-19 rapid test    Negative: Fever present > 3 days (72 hours)    Negative: [1] Fever returns after gone for over 24 hours AND [2] symptoms worse or not improved    Negative: [1] Continuous (nonstop) coughing interferes with work or school AND [2] no improvement using cough treatment per protocol    Negative: Cough present > 3 weeks    Negative: [1] COVID-19 diagnosed by positive lab test AND [2] NO symptoms (e.g., cough, fever, others)    Answer Assessment - Initial Assessment Questions  1. COVID-19 DIAGNOSIS: \"Who made your Coronavirus (COVID-19) diagnosis?\" \"Was it confirmed by a positive lab test?\" If not diagnosed by a HCP, ask \"Are there lots of cases (community spread) where you live?\" (See public health department website, if unsure)      Rapid test at a [facility] this am. Awaiting " "PCR resulets  2. COVID-19 EXPOSURE: \"Was there any known exposure to COVID before the symptoms began?\" CDC Definition of close contact: within 6 feet (2 meters) for a total of 15 minutes or more over a 24-hour period.       No known exposure.  3. ONSET: \"When did the COVID-19 symptoms start?\"       Scratchy throat on Friday. Saturday cough and congestion.  4. WORST SYMPTOM: \"What is your worst symptom?\" (e.g., cough, fever, shortness of breath, muscle aches)      Worst is stuffy nose, but can breath through nose.  5. COUGH: \"Do you have a cough?\" If Yes, ask: \"How bad is the cough?\"        Coughing a little bit.  6. FEVER: \"Do you have a fever?\" If Yes, ask: \"What is your temperature, how was it measured, and when did it start?\"      Never had a fever.   7. RESPIRATORY STATUS: \"Describe your breathing?\" (e.g., shortness of breath, wheezing, unable to speak)       \"it's good.\" No wheezing. Using rescue inhaler just to be safe, but not used today.  8. BETTER-SAME-WORSE: \"Are you getting better, staying the same or getting worse compared to yesterday?\"  If getting worse, ask, \"In what way?\"      Better today than yesterday. States slept well last night.   9. HIGH RISK DISEASE: \"Do you have any chronic medical problems?\" (e.g., asthma, heart or lung disease, weak immune system, obesity, etc.)      History of asthma. Has a CPAP and has been able to keep using it.  10. PREGNANCY: \"Is there any chance you are pregnant?\" \"When was your last menstrual period?\"        NA  11. OTHER SYMPTOMS: \"Do you have any other symptoms?\"  (e.g., chills, fatigue, headache, loss of smell or taste, muscle pain, sore throat; new loss of smell or taste especially support the diagnosis of COVID-19)        Tired yesterday. Had some muscle pain initially, but thought it was from moving furniture.    Protocols used: CORONAVIRUS (COVID-19) DIAGNOSED OR [id number] 8.25.2021    Clotilde Quinn RN    "

## 2021-11-24 NOTE — TELEPHONE ENCOUNTER
Pt calling back states he started having cold like sxs on Friday with stuffy nose, productive cough with green phlegm, and later in the day feels tired.    Did a covid test on Monday at a lab in [place] and they ran a rapid test which was positive.  Pt states they took a PCR but will not run since the rapid was positive.  Pt wondering if can get a PCR test done?    Scheduled a virtual visit with Dr. Nunez for Friday at 9:30 am    Emma HUFFMAN RN  EP Triage

## 2021-11-26 ENCOUNTER — VIRTUAL VISIT (OUTPATIENT)
Dept: FAMILY MEDICINE | Facility: CLINIC | Age: 56
End: 2021-11-26
Payer: COMMERCIAL

## 2021-11-26 DIAGNOSIS — J40 BRONCHITIS: Primary | ICD-10-CM

## 2021-11-26 DIAGNOSIS — J45.40 MODERATE PERSISTENT ASTHMA WITHOUT COMPLICATION: ICD-10-CM

## 2021-11-26 PROCEDURE — 99214 OFFICE O/P EST MOD 30 MIN: CPT | Mod: 95 | Performed by: INTERNAL MEDICINE

## 2021-11-26 RX ORDER — GUAIFENESIN/DEXTROMETHORPHAN 100-10MG/5
10 SYRUP ORAL EVERY 4 HOURS PRN
Qty: 118 ML | Refills: 0 | Status: SHIPPED | OUTPATIENT
Start: 2021-11-26 | End: 2022-02-21

## 2021-11-26 RX ORDER — AZITHROMYCIN 250 MG/1
TABLET, FILM COATED ORAL
Qty: 6 TABLET | Refills: 0 | Status: SHIPPED | OUTPATIENT
Start: 2021-11-26 | End: 2021-12-01

## 2021-11-26 NOTE — PROGRESS NOTES
Sterling is a 56 year old who is being evaluated via a billable video visit.      How would you like to obtain your AVS? [employer]  If the video visit is dropped, the invitation should be resent by: Text to cell phone: [phone]  Will anyone else be joining your video visit? No    Video Start Time: Start: 11/26/2021 09:38 am    Assessment and Plan  1. Bronchitis  New problem with recent COVID vaccination and COVID rapid test positive there is no need to repeat test at this time. Given pt been having URI symptoms along with productive cough with green sputum since 1 week. His recent COVID test was positive for rapid test lab [facility] at [place] which pt will send the report through [employer] . Will treat his Bronchitis given patient risk factors of Asthma. Avoid prednisone as pt had recent COVID booster.    - azithromycin (ZITHROMAX) 250 MG tablet; Take 2 tablets (500 mg) by mouth daily for 1 day, THEN 1 tablet (250 mg) daily for 4 days.  Dispense: 6 tablet; Refill: 0  - guaiFENesin-dextromethorphan (ROBITUSSIN DM) 100-10 MG/5ML syrup; Take 10 mLs by mouth every 4 hours as needed for cough  Dispense: 118 mL; Refill: 0    2. Moderate persistent asthma without complication  Stable, continue current Wixela and Albuterol as needed      Patient Instructions   As discussed, since you had your Vaccine last week I would wait to give you prednisone for bronchitis/ Asthma flare up at this time. Sent on Z joel and Cough syrup to your pharmacy. Please update me how you are feeling in 1 week. We can plan Nebulizer machine and prednisone if no improvement.     ========================    As you recover from COVID-19     Patients who have symptoms (cough, fever, or shortness of breath), need to isolate for 10 days from when symptoms started AND 72 hours after fever resolves (without fever reducing medications) AND improvement of respiratory symptoms (whichever is longer).     During this time:    Isolate yourself at home (in own room/own  bathroom if possible)    Do not allow any visitors    Do not go to work or school    Do not go to [employer],  centers, shopping, or other public places    Do not shake hands    Avoid close and intimate contact with others (hugging, kissing)    Follow CDC recommendations for household cleaning of frequently touched services     After the initial 10 days, continue to isolate yourself from household members as much as possible. To continue decrease the risk of community spread and exposure, you and any members of your household should limit activities in public for 14 days after starting home isolation.      You can reference the following CDC link for helpful home isolation/care tips:  https://www.cdc.gov/coronavirus/2019-ncov/downloads/10Things.pdf     Protect Others:    Cover your mouth and nose with a mask, disposable tissue or wash cloth to avoid spreading germs.    Wash your hands and face often. Use soap and water     Call Back If: Breathing difficulty develops or you become worse.        For more information about COVID19 and options for caring for yourself at home, please visit the CDC website at https://www.cdc.gov/coronavirus/2019-ncov/about/steps-when-sick.html  For more options for care at [facility], please visit our website at [email or web address]    For information about [facility] COVID-19 Clinical Trials, please visit [email or web address]     For more information, please use the [place] Department of Health COVID-19 Website: https://www.health.[email or web address]/diseases/[email or web address]/index.html  [place] Department of Health ([facility]) COVID-19 Hotlines (Interpreters   available):                Health questions: Phone Number: [phone] or [phone] and Hours: 7 a.m. to 7 p.m.    Schools and  questions: Phone Number: [phone] or [phone] and Hours 7 a.m. to 7 p.m.     Coping with Life After  COVID-19  Being in the hospital because of COVID-19 is scary. Going home can be scary, too. You may face changes to your life, the way you work or what you can eat. It s hard to adjust to change, and it s normal to feel afraid, frustrated or even angry. These feelings usually go away over time. If your feelings don t start to get better, it s called  adjustment disorder.       What signs should I look out for?  Adjustment disorder can happen to anyone in a time of stress. It makes it hard to cope with daily life. You may feel lonely or fight with loved ones, even if you re glad to be home. Watch for these signs:    Fear or worry    Hard time focusing    Sadness or anger    Trouble talking to family or friends    Feeling like you don t fit in or isolating yourself    Problems with sleep     Drinking alcohol or taking drugs to cope     What can I do?  You can help yourself get better. Feeling you have control helps you move forward. You may wonder if you ll be able to do things you did before. Be patient. Do your best to make the most of every day. Try to build relationships, be as active as you can, eat right and keep a sense of humor. Avoid smoking and drinking too much alcohol. Call your family doctor or clinic if you re not sure what to do. They can guide you to care or other services.     When should I get help?  Think about getting counseling if your sadness or frustration gets worse. Together with a trained counselor, you can talk about your problems adjusting to life after your hospital stay. You can come up with new ways to handle changes that give you more control. Your family doctor or care team can help you find a counselor.      Get help if you re thinking about hurting yourself. If you need help right away, call 911 or go to the nearest emergency room. You can also try the Crisis Text Line.     Crisis Text Line: 769-375 (http://www.crisistextline.org)  The Crisis Text Line serves anyone, in any crisis.  It gives free, 24/7 support. Here's how it works:  1. Text HOME to 242763 from anywhere in the [place], anytime, about any type of crisis.  2. A live, trained Crisis Counselor will text you back quickly.  3. The volunteer Crisis Counselor can help you move from a  hot moment  to a  cool moment.  They can also help you work out a safety plan.          Return in about 4 weeks (around 12/24/2021), or if symptoms worsen or fail to improve, for Preventative Visit.    Ella Nunez MD  [facility] LUCIUS Katz is a 56 year old who presents for the following health issues        HPI     Patient tested positive for COVID on Monday.  - had booster on Friday  - Symptoms on Saturday:  Congestion, feels like small cold, wheezing a little bit, no other symptoms  - medications: not taking anything except the ones on file.   - patient's wife is concerned regarding patient's asthma with COVID positive.     Last seen pt on 6/2021 for Asthma. He follows  as PCP and also on ADHD meds .        Allergies   Allergen Reactions     No Known Drug Allergies         Past Medical History:   Diagnosis Date     Allergic rhinitis, cause unspecified      Contact dermatitis and other eczema, due to unspecified cause     nummular/asteatotic     Herpes zoster 6/2015    right ear and neck     Moderate persistent asthma      Unspecified sinusitis (chronic)      Unspecified sleep apnea     C-Pap       Past Surgical History:   Procedure Laterality Date     COLONOSCOPY  01/01/2015     ENT SURGERY  06/01/2005    Deviated septum     HC REPAIR INCISIONAL HERNIA,REDUCIBLE  1997    Hernia Repair,  inguinal, Unilateral x2 on rt.     ORTHOPEDIC SURGERY  2017    Repair torn meniscus     VASECTOMY       Z NONSPECIFIC PROCEDURE  2004    repair deviated septum       Family History   Problem Relation Age of Onset     Hypertension Paternal Grandmother      C.A.D. Paternal Grandmother      Arthritis Paternal Grandmother       Diabetes No family hx of      Prostate Cancer No family hx of      Cancer - colorectal No family hx of        Social History     Tobacco Use     Smoking status: Former Smoker     Packs/day: 0.50     Years: 20.00     Pack years: 10.00     Types: Cigarettes     Quit date: 3/26/2001     Years since quittin.6     Smokeless tobacco: Never Used   Substance Use Topics     Alcohol use: Yes     Alcohol/week: 0.0 standard drinks     Comment: 2 a week        Current Outpatient Medications   Medication     AFLURIA QUADRIVALENT 0.5 ML injection     albuterol (PROAIR HFA) 108 (90 Base) MCG/ACT inhaler     albuterol (PROAIR HFA/PROVENTIL HFA/VENTOLIN HFA) 108 (90 Base) MCG/ACT inhaler     amphetamine-dextroamphetamine (ADDERALL XR) 20 MG 24 hr capsule     amphetamine-dextroamphetamine (ADDERALL) 5 MG tablet     azithromycin (ZITHROMAX) 250 MG tablet     fish oil-omega-3 fatty acids (FISH OIL) 1000 MG capsule     fluticasone (FLONASE) 50 MCG/ACT nasal spray     fluticasone-salmeterol (WIXELA INHUB) 500-50 MCG/DOSE inhaler     guaiFENesin-dextromethorphan (ROBITUSSIN DM) 100-10 MG/5ML syrup     montelukast (SINGULAIR) 10 MG tablet     MULTI-VITAMIN OR TABS     WAL-ITIN 10 MG OR TABS     No current facility-administered medications for this visit.        Review of Systems   Constitutional, HEENT, cardiovascular, pulmonary, GI, , musculoskeletal, neuro, skin, endocrine and psych systems are negative, except as otherwise noted.      Objective           Vitals:  No vitals were obtained today due to virtual visit.    Physical Exam   GENERAL: Healthy, alert and no distress  EYES: Eyes grossly normal to inspection.  No discharge or erythema, or obvious scleral/conjunctival abnormalities.  RESP: No audible wheeze, cough, or visible cyanosis.  No visible retractions or increased work of breathing.    SKIN: Visible skin clear. No significant rash, abnormal pigmentation or lesions.  NEURO: Cranial nerves grossly intact.  Mentation  and speech appropriate for age.  PSYCH: Mentation appears normal, affect normal/bright, judgement and insight intact, normal speech and appearance well-groomed.      Video-Visit Details    Type of service:  Video Visit    Video End Time:Stop: 11/26/2021 09:52 am    Originating Location (pt. Location): Home    Distant Location (provider location):  [facility]     Platform used for Video Visit: [employer]

## 2021-11-26 NOTE — PATIENT INSTRUCTIONS
As discussed, since you had your Vaccine last week I would wait to give you prednisone for bronchitis/ Asthma flare up at this time. Sent on Z joel and Cough syrup to your pharmacy. Please update me how you are feeling in 1 week. We can plan Nebulizer machine and prednisone if no improvement.     ========================    As you recover from COVID-19     Patients who have symptoms (cough, fever, or shortness of breath), need to isolate for 10 days from when symptoms started AND 72 hours after fever resolves (without fever reducing medications) AND improvement of respiratory symptoms (whichever is longer).     During this time:    Isolate yourself at home (in own room/own bathroom if possible)    Do not allow any visitors    Do not go to work or school    Do not go to Synagogue,  centers, shopping, or other public places    Do not shake hands    Avoid close and intimate contact with others (hugging, kissing)    Follow CDC recommendations for household cleaning of frequently touched services     After the initial 10 days, continue to isolate yourself from household members as much as possible. To continue decrease the risk of community spread and exposure, you and any members of your household should limit activities in public for 14 days after starting home isolation.      You can reference the following CDC link for helpful home isolation/care tips:  https://www.cdc.gov/coronavirus/2019-ncov/downloads/10Things.pdf     Protect Others:    Cover your mouth and nose with a mask, disposable tissue or wash cloth to avoid spreading germs.    Wash your hands and face often. Use soap and water     Call Back If: Breathing difficulty develops or you become worse.        For more information about COVID19 and options for caring for yourself at home, please visit the CDC website at https://www.cdc.gov/coronavirus/2019-ncov/about/steps-when-sick.html  For more options for care at [facility], please visit our  website at [email or web address]    For information about [facility] COVID-19 Clinical Trials, please visit [email or web address]     For more information, please use the [place] Department of Health COVID-19 Website: https://www.health.[facility].us/diseases/coronavirus/index.html  [place] Department of Health ([facility]) COVID-19 Hotlines (Interpreters   available):                Health questions: Phone Number: [phone] or [phone] and Hours: 7 a.m. to 7 p.m.    Schools and  questions: Phone Number: [phone] or [phone] and Hours 7 a.m. to 7 p.m.     Coping with Life After COVID-19  Being in the hospital because of COVID-19 is scary. Going home can be scary, too. You may face changes to your life, the way you work or what you can eat. It s hard to adjust to change, and it s normal to feel afraid, frustrated or even angry. These feelings usually go away over time. If your feelings don t start to get better, it s called  adjustment disorder.       What signs should I look out for?  Adjustment disorder can happen to anyone in a time of stress. It makes it hard to cope with daily life. You may feel lonely or fight with loved ones, even if you re glad to be home. Watch for these signs:    Fear or worry    Hard time focusing    Sadness or anger    Trouble talking to family or friends    Feeling like you don t fit in or isolating yourself    Problems with sleep     Drinking alcohol or taking drugs to cope     What can I do?  You can help yourself get better. Feeling you have control helps you move forward. You may wonder if you ll be able to do things you did before. Be patient. Do your best to make the most of every day. Try to build relationships, be as active as you can, eat right and keep a sense of humor. Avoid smoking and drinking too much alcohol. Call your family doctor or clinic if you re not sure what to do. They  can guide you to care or other services.     When should I get help?  Think about getting counseling if your sadness or frustration gets worse. Together with a trained counselor, you can talk about your problems adjusting to life after your hospital stay. You can come up with new ways to handle changes that give you more control. Your family doctor or care team can help you find a counselor.      Get help if you re thinking about hurting yourself. If you need help right away, call 911 or go to the nearest emergency room. You can also try the Crisis Text Line.     Crisis Text Line: 050-249 (http://www.crisistextline.org)  The Crisis Text Line serves anyone, in any crisis. It gives free, 24/7 support. Here's how it works:  1. Text HOME to 440496 from anywhere in the [place], anytime, about any type of crisis.  2. A live, trained Crisis Counselor will text you back quickly.  3. The volunteer Crisis Counselor can help you move from a  hot moment  to a  cool moment.  They can also help you work out a safety plan.

## 2021-12-02 ENCOUNTER — MYC REFILL (OUTPATIENT)
Dept: FAMILY MEDICINE | Facility: CLINIC | Age: 56
End: 2021-12-02
Payer: COMMERCIAL

## 2021-12-02 DIAGNOSIS — F98.8 ATTENTION DEFICIT DISORDER, UNSPECIFIED HYPERACTIVITY PRESENCE: ICD-10-CM

## 2021-12-02 DIAGNOSIS — Z79.899 CONTROLLED SUBSTANCE AGREEMENT SIGNED: ICD-10-CM

## 2021-12-06 RX ORDER — DEXTROAMPHETAMINE SACCHARATE, AMPHETAMINE ASPARTATE MONOHYDRATE, DEXTROAMPHETAMINE SULFATE AND AMPHETAMINE SULFATE 5; 5; 5; 5 MG/1; MG/1; MG/1; MG/1
20 CAPSULE, EXTENDED RELEASE ORAL DAILY
Qty: 30 CAPSULE | Refills: 0 | Status: SHIPPED | OUTPATIENT
Start: 2021-12-06 | End: 2022-01-12

## 2021-12-06 NOTE — TELEPHONE ENCOUNTER
Amphetamine/dextroamphetamine xr 20 mg       Last Written Prescription Date:  10/16/21  Last Fill Quantity: 30,   # refills: 0  Last Office Visit: 11/26/21 kellie Nunez  Future Office visit:       Routing refill request to provider for review/approval because:  Drug not on the [facility], [facility] or [facility] refill protocol or controlled substance    Emma HUFFMAN RN  EP Triage

## 2022-01-08 ENCOUNTER — HEALTH MAINTENANCE LETTER (OUTPATIENT)
Age: 57
End: 2022-01-08

## 2022-01-20 ENCOUNTER — TELEPHONE (OUTPATIENT)
Dept: FAMILY MEDICINE | Facility: CLINIC | Age: 57
End: 2022-01-20
Payer: COMMERCIAL

## 2022-01-20 NOTE — TELEPHONE ENCOUNTER
Marlena with [employer] called to inquire if the clinic has received a form: medical questionnaire for insurance purposes for this pt. Sent on 1/18/22. Routing to TC for assistance, do not see anything from 1/18/22.     Marlena number: [phone]  Confidential     Briana ROBLES RN  [facility]

## 2022-01-21 NOTE — TELEPHONE ENCOUNTER
[employer] call asking about the medical questionnaire again.  Please call them when you received.  Thank you

## 2022-01-24 NOTE — TELEPHONE ENCOUNTER
Called [employer] at the number listed below. The recording asks for either an extension or case number. Neither is provided in the note below. Left message on generic voicemail but once again they asked for the claim # or an extension for the agent.  Kala Gonzalez,

## 2022-02-16 NOTE — TELEPHONE ENCOUNTER
This form is regarding his illness for which he was seen by dr Nunez, so please ask her to complete this form

## 2022-02-16 NOTE — TELEPHONE ENCOUNTER
Patient is being treated for Moderate persistent asthma by PCP diagnosed in 2016 and I see the refills for his Asthma medications were being taken care by  since past few years on patient visits. I have seen him for acute visits of asthma exacerbation.     The form is asking for endorsing on his chronic Asthma illness from PCP. If this is something patient missed his work due to flare up of his Asthma then I will be happily filling it.     I request Kala to please reach the concerned facility what is this regarding ? And clarify us.   [id number] as mentioned in the Claims paperwork. What does this mean.     Thank you,  Ella Nunez MD on 2/16/2022 at 4:29 PM

## 2022-02-16 NOTE — TELEPHONE ENCOUNTER
Agree with getting more clarification   I sure have seen him for his asthma but do not ever recall this form sent previously.     Thanks

## 2022-02-17 NOTE — TELEPHONE ENCOUNTER
Spoke with the pt and gave him the message below. He requested the form to be emailed to him so he could look at it. He will respond back in a My Chart message. Form with the Team 3 TC until we hear back from the pt.  Kala Gonzalez,

## 2022-02-18 NOTE — TELEPHONE ENCOUNTER
"Pt called back after speaking with [employer]. The wording on the form was sent in error. They will send a new form with the correct wording \"mild asthma\"  Kala Gonzalez,     "

## 2022-02-21 ENCOUNTER — VIRTUAL VISIT (OUTPATIENT)
Dept: FAMILY MEDICINE | Facility: CLINIC | Age: 57
End: 2022-02-21
Payer: COMMERCIAL

## 2022-02-21 DIAGNOSIS — Z79.899 CONTROLLED SUBSTANCE AGREEMENT SIGNED: ICD-10-CM

## 2022-02-21 DIAGNOSIS — F98.8 ATTENTION DEFICIT DISORDER, UNSPECIFIED HYPERACTIVITY PRESENCE: ICD-10-CM

## 2022-02-21 DIAGNOSIS — J45.40 MODERATE PERSISTENT ASTHMA WITHOUT COMPLICATION: Primary | ICD-10-CM

## 2022-02-21 DIAGNOSIS — J45.40 MODERATE PERSISTENT ASTHMA, UNCOMPLICATED: ICD-10-CM

## 2022-02-21 PROCEDURE — 99214 OFFICE O/P EST MOD 30 MIN: CPT | Mod: 95 | Performed by: FAMILY MEDICINE

## 2022-02-21 RX ORDER — DEXTROAMPHETAMINE SACCHARATE, AMPHETAMINE ASPARTATE MONOHYDRATE, DEXTROAMPHETAMINE SULFATE AND AMPHETAMINE SULFATE 5; 5; 5; 5 MG/1; MG/1; MG/1; MG/1
20 CAPSULE, EXTENDED RELEASE ORAL DAILY
Qty: 30 CAPSULE | Refills: 0 | Status: SHIPPED | OUTPATIENT
Start: 2022-03-24 | End: 2022-04-23

## 2022-02-21 RX ORDER — DEXTROAMPHETAMINE SACCHARATE, AMPHETAMINE ASPARTATE MONOHYDRATE, DEXTROAMPHETAMINE SULFATE AND AMPHETAMINE SULFATE 5; 5; 5; 5 MG/1; MG/1; MG/1; MG/1
20 CAPSULE, EXTENDED RELEASE ORAL DAILY
Qty: 30 CAPSULE | Refills: 0 | Status: SHIPPED | OUTPATIENT
Start: 2022-04-24 | End: 2022-05-24

## 2022-02-21 RX ORDER — MONTELUKAST SODIUM 10 MG/1
10 TABLET ORAL AT BEDTIME
Qty: 90 TABLET | Refills: 1 | Status: SHIPPED | OUTPATIENT
Start: 2022-02-21 | End: 2022-07-14

## 2022-02-21 RX ORDER — DEXTROAMPHETAMINE SACCHARATE, AMPHETAMINE ASPARTATE MONOHYDRATE, DEXTROAMPHETAMINE SULFATE AND AMPHETAMINE SULFATE 5; 5; 5; 5 MG/1; MG/1; MG/1; MG/1
20 CAPSULE, EXTENDED RELEASE ORAL DAILY
Qty: 30 CAPSULE | Refills: 0 | Status: SHIPPED | OUTPATIENT
Start: 2022-02-21 | End: 2022-06-02

## 2022-02-21 RX ORDER — DEXTROAMPHETAMINE SACCHARATE, AMPHETAMINE ASPARTATE MONOHYDRATE, DEXTROAMPHETAMINE SULFATE AND AMPHETAMINE SULFATE 5; 5; 5; 5 MG/1; MG/1; MG/1; MG/1
20 CAPSULE, EXTENDED RELEASE ORAL DAILY
Qty: 30 CAPSULE | Refills: 0 | Status: CANCELLED | OUTPATIENT
Start: 2022-02-21

## 2022-02-21 ASSESSMENT — ASTHMA QUESTIONNAIRES: ACT_TOTALSCORE: 25

## 2022-02-21 NOTE — PROGRESS NOTES
Sterling is a 57 year old who is being evaluated via a billable video visit.      How would you like to obtain your AVS? [employer]  If the video visit is dropped, the invitation should be resent by: [employer] or [employer] - : [phone]  Will anyone else be joining your video visit? No    Video Start Time: 5:11 PM    Assessment & Plan         (F98.8) Attention deficit disorder, unspecified hyperactivity presence  Comment:   Plan: amphetamine-dextroamphetamine (ADDERALL XR) 20         MG 24 hr capsule, amphetamine-dextroamphetamine        (ADDERALL XR) 20 MG 24 hr capsule,         amphetamine-dextroamphetamine (ADDERALL XR) 20         MG 24 hr capsule          Doing well sent 3 scripts faxed.  f/u in 3-4 month sooner if problem     (J45.40) Moderate persistent asthma, uncomplicated  Comment:   Plan: fluticasone-salmeterol (WIXELA INHUB) 500-50         MCG/DOSE inhaler, montelukast (SINGULAIR) 10 MG        tablet          stable on current med's.  refill  Sent f/u in 4-6 months  sooner if problem     Check labs. refill sent.Cares and  treatment discussed follow. up if problem   Patient expressed understanding and agreement with treatment plan. All patient's questions were answered, will let me know if has more later.  Medications: Rx's: Reviewed the potential side effects/complications of medications prescribed.        BMI:   Estimated body mass index is 37.7 kg/m  as calculated from the following:    Height as of 6/2/21: 1.829 m (6').    Weight as of 9/16/21: 126.1 kg (278 lb).   Weight management plan: Discussed healthy diet and exercise guidelines      Kay Ramirez MD  [facility]    Dulce Katz is a 57 year old who presents for the following health issues     HPI     Medication Followup of amphetamine-dextroamphetamine (ADDERALL XR) 20 MG 24 hr capsule    Taking Medication as prescribed: yes    Side Effects:  None    Medication Helping Symptoms:  yes     Asthma  Follow-Up    Was ACT completed today?    Yes    ACT Total Scores 9/16/2021   ACT TOTAL SCORE -   ASTHMA ER VISITS -   ASTHMA HOSPITALIZATIONS -   ACT TOTAL SCORE (Goal Greater than or Equal to 20) 22   In the past 12 months, how many times did you visit the emergency room for your asthma without being admitted to the hospital? 0   In the past 12 months, how many times were you hospitalized overnight because of your asthma? 0          How many days per week do you miss taking your asthma controller medication?  0    Please describe any recent triggers for your asthma: None    Have you had any Emergency Room Visits, Urgent Care Visits, or Hospital Admissions since your last office visit?  No        Review of Systems   Constitutional, HEENT, cardiovascular, pulmonary, GI, , musculoskeletal, neuro, skin, endocrine and psych systems are negative, except as otherwise noted.      Objective    Vitals - Patient Reported  Pain Score: No Pain (0)      Vitals:  No vitals were obtained today due to virtual visit.    Physical Exam   GENERAL: Healthy, alert and no distress  EYES: Eyes grossly normal to inspection.  No discharge or erythema, or obvious scleral/conjunctival abnormalities.  RESP: No audible wheeze, cough, or visible cyanosis.  No visible retractions or increased work of breathing.    SKIN: Visible skin clear. No significant rash, abnormal pigmentation or lesions.  NEURO: Cranial nerves grossly intact.  Mentation and speech appropriate for age.  PSYCH: Mentation appears normal, affect normal/bright, judgement and insight intact, normal speech and appearance well-groomed.            Video-Visit Details    Type of service:  Video Visit    Video End Time:5:25     Originating Location (pt. Location): Home    Distant Location (provider location):  [facility]     Platform used for Video Visit: [employer]

## 2022-03-03 ENCOUNTER — TELEPHONE (OUTPATIENT)
Dept: FAMILY MEDICINE | Facility: CLINIC | Age: 57
End: 2022-03-03
Payer: COMMERCIAL

## 2022-03-07 ASSESSMENT — ENCOUNTER SYMPTOMS
WEAKNESS: 0
PALPITATIONS: 0
HEMATURIA: 0
DIZZINESS: 0
ABDOMINAL PAIN: 0
FEVER: 0
SHORTNESS OF BREATH: 0
EYE PAIN: 0
ARTHRALGIAS: 0
DIARRHEA: 0
PARESTHESIAS: 0
HEADACHES: 0
CHILLS: 0
DYSURIA: 0
NAUSEA: 0
SORE THROAT: 0
CONSTIPATION: 0
COUGH: 0
HEARTBURN: 0
FREQUENCY: 0
NERVOUS/ANXIOUS: 0
JOINT SWELLING: 0
MYALGIAS: 0
HEMATOCHEZIA: 0

## 2022-03-09 ENCOUNTER — TRANSFERRED RECORDS (OUTPATIENT)
Dept: HEALTH INFORMATION MANAGEMENT | Facility: CLINIC | Age: 57
End: 2022-03-09

## 2022-03-09 ENCOUNTER — OFFICE VISIT (OUTPATIENT)
Dept: FAMILY MEDICINE | Facility: CLINIC | Age: 57
End: 2022-03-09
Payer: COMMERCIAL

## 2022-03-09 VITALS
SYSTOLIC BLOOD PRESSURE: 126 MMHG | RESPIRATION RATE: 14 BRPM | HEIGHT: 72 IN | WEIGHT: 282 LBS | BODY MASS INDEX: 38.19 KG/M2 | HEART RATE: 58 BPM | TEMPERATURE: 97.3 F | OXYGEN SATURATION: 96 % | DIASTOLIC BLOOD PRESSURE: 80 MMHG

## 2022-03-09 DIAGNOSIS — R63.5 WEIGHT GAIN: ICD-10-CM

## 2022-03-09 DIAGNOSIS — Z00.00 ROUTINE GENERAL MEDICAL EXAMINATION AT A HEALTH CARE FACILITY: Primary | ICD-10-CM

## 2022-03-09 DIAGNOSIS — Z12.5 SCREENING PSA (PROSTATE SPECIFIC ANTIGEN): ICD-10-CM

## 2022-03-09 DIAGNOSIS — N52.9 ERECTILE DYSFUNCTION, UNSPECIFIED ERECTILE DYSFUNCTION TYPE: ICD-10-CM

## 2022-03-09 LAB
ALBUMIN SERPL-MCNC: 3.2 G/DL (ref 3.4–5)
ALP SERPL-CCNC: 106 U/L (ref 40–150)
ALT SERPL W P-5'-P-CCNC: 146 U/L (ref 0–70)
ANION GAP SERPL CALCULATED.3IONS-SCNC: 8 MMOL/L (ref 3–14)
AST SERPL W P-5'-P-CCNC: 95 U/L (ref 0–45)
BILIRUB SERPL-MCNC: 0.9 MG/DL (ref 0.2–1.3)
BUN SERPL-MCNC: 17 MG/DL (ref 7–30)
CALCIUM SERPL-MCNC: 9.1 MG/DL (ref 8.5–10.1)
CHLORIDE BLD-SCNC: 110 MMOL/L (ref 94–109)
CHOLEST SERPL-MCNC: 175 MG/DL
CO2 SERPL-SCNC: 23 MMOL/L (ref 20–32)
CREAT SERPL-MCNC: 0.59 MG/DL (ref 0.66–1.25)
FASTING STATUS PATIENT QL REPORTED: YES
GFR SERPL CREATININE-BSD FRML MDRD: >90 ML/MIN/1.73M2
GLUCOSE BLD-MCNC: 99 MG/DL (ref 70–99)
HDLC SERPL-MCNC: 63 MG/DL
LDLC SERPL CALC-MCNC: 97 MG/DL
NONHDLC SERPL-MCNC: 112 MG/DL
POTASSIUM BLD-SCNC: 3.9 MMOL/L (ref 3.4–5.3)
PROT SERPL-MCNC: 6.8 G/DL (ref 6.8–8.8)
PSA SERPL-MCNC: 0.31 UG/L (ref 0–4)
SODIUM SERPL-SCNC: 141 MMOL/L (ref 133–144)
T4 FREE SERPL-MCNC: 1.3 NG/DL (ref 0.76–1.46)
TRIGL SERPL-MCNC: 76 MG/DL
TSH SERPL DL<=0.005 MIU/L-ACNC: 0.29 MU/L (ref 0.4–4)

## 2022-03-09 PROCEDURE — 80053 COMPREHEN METABOLIC PANEL: CPT | Performed by: FAMILY MEDICINE

## 2022-03-09 PROCEDURE — G0103 PSA SCREENING: HCPCS | Performed by: FAMILY MEDICINE

## 2022-03-09 PROCEDURE — 99396 PREV VISIT EST AGE 40-64: CPT | Performed by: FAMILY MEDICINE

## 2022-03-09 PROCEDURE — 36415 COLL VENOUS BLD VENIPUNCTURE: CPT | Performed by: FAMILY MEDICINE

## 2022-03-09 PROCEDURE — 84443 ASSAY THYROID STIM HORMONE: CPT | Performed by: FAMILY MEDICINE

## 2022-03-09 PROCEDURE — 80061 LIPID PANEL: CPT | Performed by: FAMILY MEDICINE

## 2022-03-09 PROCEDURE — 99213 OFFICE O/P EST LOW 20 MIN: CPT | Mod: 25 | Performed by: FAMILY MEDICINE

## 2022-03-09 PROCEDURE — 84439 ASSAY OF FREE THYROXINE: CPT | Performed by: FAMILY MEDICINE

## 2022-03-09 RX ORDER — SILDENAFIL 25 MG/1
25-50 TABLET, FILM COATED ORAL DAILY PRN
Qty: 30 TABLET | Refills: 1 | Status: SHIPPED | OUTPATIENT
Start: 2022-03-09 | End: 2023-04-26

## 2022-03-09 NOTE — PROGRESS NOTES
SUBJECTIVE:   CC: Orlando Holder is an 57 year old male who presents for preventative health visit.     Patient has been advised of split billing requirements and indicates understanding: Yes  Healthy Habits:     Getting at least 3 servings of Calcium per day:  Yes    Bi-annual eye exam:  Yes    Dental care twice a year:  Yes    Sleep apnea or symptoms of sleep apnea:  Sleep apnea    Diet:  Regular (no restrictions)    Frequency of exercise:  4-5 days/week    Duration of exercise:  30-45 minutes    Taking medications regularly:  Yes    Medication side effects:  None    PHQ-2 Total Score: 0    Additional concerns today:  No          PROBLEMS TO ADD ON...    Doing exercsie regularly but hard to loose weight , in fact  has gained some weight .  culd better with eating and will consider  trying intermitenyt fasting to see if jalil       Also has hx of ED and wants to try viagra again . It did help a while ago but has not used in along time so wants to try again       Today's PHQ-2 Score:   PHQ-2 ( 1999 [employer]) 3/7/2022   Q1: Little interest or pleasure in doing things 0   Q2: Feeling down, depressed or hopeless 0   PHQ-2 Score 0   PHQ-2 Total Score (12-17 Years)- Positive if 3 or more points; Administer PHQ-A if positive -   Q1: Little interest or pleasure in doing things Not at all   Q2: Feeling down, depressed or hopeless Not at all   PHQ-2 Score 0       Abuse: Current or Past(Physical, Sexual or Emotional)- No  Do you feel safe in your environment? Yes    Have you ever done Advance Care Planning? (For example, a Health Directive, POLST, or a discussion with a medical provider or your loved ones about your wishes): No, advance care planning information given to patient to review.  Advanced care planning was discussed at today's visit.    Social History     Tobacco Use     Smoking status: Former Smoker     Packs/day: 0.50     Years: 20.00     Pack years: 10.00     Types: Cigarettes     Quit date: 3/26/2001      Years since quittin.9     Smokeless tobacco: Never Used   Substance Use Topics     Alcohol use: Yes     Alcohol/week: 0.0 standard drinks     Comment: 2 a week     If you drink alcohol do you typically have >3 drinks per day or >7 drinks per week? No    No flowsheet data found.    Last PSA:   PSA   Date Value Ref Range Status   2020 0.42 0 - 4 ug/L Final     Comment:     Assay Method:  Chemiluminescence using Siemens Vista analyzer       Reviewed orders with patient. Reviewed health maintenance and updated orders accordingly - Yes  Patient Active Problem List   Diagnosis     LFT elevation     Difficulty concentrating     Nevus     CARDIOVASCULAR SCREENING; LDL GOAL LESS THAN 160     Erectile dysfunction     Common wart     Achilles tendonitis     Anal fissure     Obesity     History of colonic polyps     ADD (attention deficit disorder)     Controlled substance agreement signed     Seasonal allergic rhinitis, unspecified allergic rhinitis trigger     Moderate persistent asthma without complication     BMI 38.0-38.9,adult     Obesity, unspecified obesity severity, unspecified obesity type     Abnormal thyroid blood test     Elevated fasting glucose     Morbid obesity (H)     Screening PSA (prostate specific antigen)     Past Surgical History:   Procedure Laterality Date     COLONOSCOPY  2015     ENT SURGERY  2005    Deviated septum     HC REPAIR INCISIONAL HERNIA,REDUCIBLE      Hernia Repair,  inguinal, Unilateral x2 on rt.     ORTHOPEDIC SURGERY      Repair torn meniscus     VASECTOMY       ZZC NONSPECIFIC PROCEDURE  2004    repair deviated septum       Social History     Tobacco Use     Smoking status: Former Smoker     Packs/day: 0.50     Years: 20.00     Pack years: 10.00     Types: Cigarettes     Quit date: 3/26/2001     Years since quittin.9     Smokeless tobacco: Never Used   Substance Use Topics     Alcohol use: Yes     Alcohol/week: 0.0 standard drinks     Comment: 2 a  week     Family History   Problem Relation Age of Onset     Hypertension Paternal Grandmother      C.A.D. Paternal Grandmother      Arthritis Paternal Grandmother      Diabetes No family hx of      Prostate Cancer No family hx of      Cancer - colorectal No family hx of            Reviewed and updated as needed this visit by clinical staff   Tobacco  Allergies  Meds              Reviewed and updated as needed this visit by Provider                 Past Medical History:   Diagnosis Date     Allergic rhinitis, cause unspecified      Contact dermatitis and other eczema, due to unspecified cause     nummular/asteatotic     Herpes zoster 6/2015    right ear and neck     Moderate persistent asthma      Unspecified sinusitis (chronic)      Unspecified sleep apnea     C-Pap        Review of Systems  CONSTITUTIONAL: NEGATIVE for fever, chills, change in weight  INTEGUMENTARY/SKIN: NEGATIVE for worrisome rashes, moles or lesions  EYES: NEGATIVE for vision changes or irritation  ENT: NEGATIVE for ear, mouth and throat problems  RESP: NEGATIVE for significant cough or SOB  CV: NEGATIVE for chest pain, palpitations or peripheral edema  GI: NEGATIVE for nausea, abdominal pain, heartburn, or change in bowel habits   male: negative for dysuria, hematuria, decreased urinary stream, erectile dysfunction, urethral discharge  MUSCULOSKELETAL: NEGATIVE for significant arthralgias or myalgia  NEURO: NEGATIVE for weakness, dizziness or paresthesias  ENDOCRINE: NEGATIVE for temperature intolerance, skin/hair changes  HEME/ALLERGY/IMMUNE: NEGATIVE for bleeding problems  PSYCHIATRIC: NEGATIVE for changes in mood or affect    OBJECTIVE:   /80   Pulse 58   Temp 97.3  F (36.3  C) (Tympanic)   Resp 14   Ht 1.829 m (6')   Wt 127.9 kg (282 lb)   SpO2 96%   BMI 38.25 kg/m      Physical Exam  GENERAL: healthy, alert and no distress  EYES: Eyes grossly normal to inspection, PERRL and conjunctivae and sclerae normal  HENT: ear canals  and TM's normal, nose and mouth without ulcers or lesions  NECK: no adenopathy, no asymmetry, masses, or scars and thyroid normal to palpation  RESP: lungs clear to auscultation - no rales, rhonchi or wheezes  CV: regular rate and rhythm, normal S1 S2, no S3 or S4, no murmur, click or rub, no peripheral edema and peripheral pulses strong  ABDOMEN: soft, nontender, no hepatosplenomegaly, no masses and bowel sounds normal   (male): testicles normal without atrophy or masses, no hernias and penis normal without urethral discharge  RECTAL: deferred  MS: no gross musculoskeletal defects noted, no edema  SKIN: no suspicious lesions or rashes  NEURO: Normal strength and tone, mentation intact and speech normal  PSYCH: mentation appears normal, affect normal/bright      ASSESSMENT/PLAN:   (Z00.00) Routine general medical examination at a health care facility  (primary encounter diagnosis)  Comment:   Plan: Lipid panel reflex to direct LDL Fasting,         Comprehensive metabolic panel      (Z12.5) Screening PSA (prostate specific antigen)  Comment:   Plan: PSA, screen                    (Z68.38) BMI 38.0-38.9,adult  Comment:   Plan: Healthy diet and exercise reviewed. Talked about intermittent fasting/ weight watchers, meal planning , reducing carb's and adding more protein etc. Risks of obesity discussed.  Encourage exercise.         (R63.5) Weight gain  Comment:   Plan: TSH with free T4 reflex            (N52.9) Erectile dysfunction, unspecified erectile dysfunction type  Comment:   Plan: sildenafil (VIAGRA) 25 MG tablet        Want to try again   Script  sent.Cares and  treatment discussed.  follow up if problem     Check labs  Patient expressed understanding and agreement with treatment plan. All patient's questions were answered, will let me know if has more later.  Medications: Rx's: Reviewed the potential side effects/complications of medications prescribed.           COUNSELING:   Reviewed preventive health  counseling, as reflected in patient instructions       Regular exercise       Healthy diet/nutrition       Vision screening       Hearing screening       Immunizations    Declined: Zoster due to Other planning to get his second dos at pharmacy                Alcohol Use        Family planning       Prostate cancer screening       Osteoporosis prevention/bone health    Estimated body mass index is 38.25 kg/m  as calculated from the following:    Height as of this encounter: 1.829 m (6').    Weight as of this encounter: 127.9 kg (282 lb).     Weight management plan: Discussed healthy diet and exercise guidelines    He reports that he quit smoking about 20 years ago. His smoking use included cigarettes. He has a 10.00 pack-year smoking history. He has never used smokeless tobacco.      Counseling Resources:  ATP IV Guidelines  Pooled Cohorts Equation Calculator  FRAX Risk Assessment  ICSI Preventive Guidelines  Dietary Guidelines for Americans, 2010  USDA's MyPlate  ASA Prophylaxis  Lung CA Screening    Kay Ramirez MD  [facility]

## 2022-03-21 ENCOUNTER — TELEPHONE (OUTPATIENT)
Dept: FAMILY MEDICINE | Facility: CLINIC | Age: 57
End: 2022-03-21
Payer: COMMERCIAL

## 2022-03-21 NOTE — TELEPHONE ENCOUNTER
Pt called regarding amphetamine-dextroamphetamine rx. He states [facility] only [facility] starting at [facility]/22 and they don't have the 3/24/22 rx. This RN reached out to [facility] to clarify this. [facility] does have the 3/24/22 and the earliest they are able to fill this is tomorrow morning. This RN called the pt back and relayed this information. He stated his understanding and had no further questions or concerns.     Briana ROBLES RN  [facility]

## 2022-06-02 ENCOUNTER — MYC REFILL (OUTPATIENT)
Dept: FAMILY MEDICINE | Facility: CLINIC | Age: 57
End: 2022-06-02
Payer: COMMERCIAL

## 2022-06-02 DIAGNOSIS — F98.8 ATTENTION DEFICIT DISORDER, UNSPECIFIED HYPERACTIVITY PRESENCE: ICD-10-CM

## 2022-06-03 NOTE — TELEPHONE ENCOUNTER
Routing refill request to provider for review/approval because:  Drug not on the [facility] refill protocol     Marino Milian RN  [facility] Triage Nurse

## 2022-06-06 RX ORDER — DEXTROAMPHETAMINE SACCHARATE, AMPHETAMINE ASPARTATE MONOHYDRATE, DEXTROAMPHETAMINE SULFATE AND AMPHETAMINE SULFATE 5; 5; 5; 5 MG/1; MG/1; MG/1; MG/1
20 CAPSULE, EXTENDED RELEASE ORAL DAILY
Qty: 30 CAPSULE | Refills: 0 | Status: SHIPPED | OUTPATIENT
Start: 2022-06-06 | End: 2022-07-11

## 2022-07-11 ENCOUNTER — MYC REFILL (OUTPATIENT)
Dept: FAMILY MEDICINE | Facility: CLINIC | Age: 57
End: 2022-07-11

## 2022-07-11 DIAGNOSIS — F98.8 ATTENTION DEFICIT DISORDER, UNSPECIFIED HYPERACTIVITY PRESENCE: ICD-10-CM

## 2022-07-12 DIAGNOSIS — J45.40 MODERATE PERSISTENT ASTHMA, UNCOMPLICATED: ICD-10-CM

## 2022-07-12 RX ORDER — DEXTROAMPHETAMINE SACCHARATE, AMPHETAMINE ASPARTATE MONOHYDRATE, DEXTROAMPHETAMINE SULFATE AND AMPHETAMINE SULFATE 5; 5; 5; 5 MG/1; MG/1; MG/1; MG/1
20 CAPSULE, EXTENDED RELEASE ORAL DAILY
Qty: 30 CAPSULE | Refills: 0 | Status: SHIPPED | OUTPATIENT
Start: 2022-07-12 | End: 2022-08-22

## 2022-07-14 RX ORDER — MONTELUKAST SODIUM 10 MG/1
TABLET ORAL
Qty: 90 TABLET | Refills: 2 | Status: SHIPPED | OUTPATIENT
Start: 2022-07-14 | End: 2023-04-26

## 2022-07-14 NOTE — TELEPHONE ENCOUNTER
ACT Total Scores 6/23/2021 9/16/2021 2/21/2022   ACT TOTAL SCORE - - -   ASTHMA ER VISITS - - -   ASTHMA HOSPITALIZATIONS - - -   ACT TOTAL SCORE (Goal Greater than or Equal to 20) 18 22 25   In the past 12 months, how many times did you visit the emergency room for your asthma without being admitted to the hospital? 0 0 0   In the past 12 months, how many times were you hospitalized overnight because of your asthma? 0 0 0     Refilled per [facility] protocol.  Radha Caldwell RN

## 2022-07-27 ENCOUNTER — E-VISIT (OUTPATIENT)
Dept: FAMILY MEDICINE | Facility: CLINIC | Age: 57
End: 2022-07-27
Payer: COMMERCIAL

## 2022-07-27 DIAGNOSIS — B96.89 ACUTE BACTERIAL SINUSITIS: Primary | ICD-10-CM

## 2022-07-27 DIAGNOSIS — J01.90 ACUTE BACTERIAL SINUSITIS: Primary | ICD-10-CM

## 2022-07-27 PROCEDURE — 99421 OL DIG E/M SVC 5-10 MIN: CPT | Performed by: PHYSICIAN ASSISTANT

## 2022-07-27 NOTE — PATIENT INSTRUCTIONS
Dear Orlando Holder    After reviewing your responses, I've been able to diagnose you with?a sinus infection caused by bacteria.?     Based on your responses and diagnosis, I have prescribed Augmentin to treat your symptoms. I have sent this to your pharmacy.?     It is also important to stay well hydrated, get lots of rest and take over-the-counter decongestants,?tylenol?or ibuprofen if you?are able to?take those medications per your primary care provider to help relieve discomfort.?     It is important that you take?all of?your prescribed medication even if your symptoms are improving after a few doses.? Taking?all of?your medicine helps prevent the symptoms from returning.?     If your symptoms worsen, you develop severe headache, vomiting, high fever (>102), or are not improving in 7 days, please contact your primary care provider for an appointment or visit any of our convenient Walk-in Care or Urgent Care Centers to be seen which can be found on our website?here.?     Thanks again for choosing?us?as your health care partner,?   ?  Ching Stanton PA-C?

## 2022-08-22 ENCOUNTER — MYC REFILL (OUTPATIENT)
Dept: FAMILY MEDICINE | Facility: CLINIC | Age: 57
End: 2022-08-22

## 2022-08-22 DIAGNOSIS — F98.8 ATTENTION DEFICIT DISORDER, UNSPECIFIED HYPERACTIVITY PRESENCE: ICD-10-CM

## 2022-08-24 NOTE — TELEPHONE ENCOUNTER
Routing refill request to provider for review/approval because:  Drug not on the [facility] refill protocol     Taylor Bryant RN

## 2022-08-25 RX ORDER — DEXTROAMPHETAMINE SACCHARATE, AMPHETAMINE ASPARTATE MONOHYDRATE, DEXTROAMPHETAMINE SULFATE AND AMPHETAMINE SULFATE 5; 5; 5; 5 MG/1; MG/1; MG/1; MG/1
20 CAPSULE, EXTENDED RELEASE ORAL DAILY
Qty: 30 CAPSULE | Refills: 0 | Status: SHIPPED | OUTPATIENT
Start: 2022-08-25 | End: 2022-09-28

## 2022-09-28 ENCOUNTER — MYC REFILL (OUTPATIENT)
Dept: FAMILY MEDICINE | Facility: CLINIC | Age: 57
End: 2022-09-28

## 2022-09-28 DIAGNOSIS — F98.8 ATTENTION DEFICIT DISORDER, UNSPECIFIED HYPERACTIVITY PRESENCE: ICD-10-CM

## 2022-09-29 ENCOUNTER — VIRTUAL VISIT (OUTPATIENT)
Dept: FAMILY MEDICINE | Facility: CLINIC | Age: 57
End: 2022-09-29
Payer: COMMERCIAL

## 2022-09-29 DIAGNOSIS — J45.41 MODERATE PERSISTENT ASTHMA WITH ACUTE EXACERBATION: ICD-10-CM

## 2022-09-29 DIAGNOSIS — J20.9 ACUTE BRONCHITIS WITH SYMPTOMS > 10 DAYS: Primary | ICD-10-CM

## 2022-09-29 PROCEDURE — 99213 OFFICE O/P EST LOW 20 MIN: CPT | Mod: 95 | Performed by: FAMILY MEDICINE

## 2022-09-29 RX ORDER — PREDNISONE 20 MG/1
20 TABLET ORAL DAILY
Qty: 5 TABLET | Refills: 0 | Status: SHIPPED | OUTPATIENT
Start: 2022-09-29 | End: 2023-01-18

## 2022-09-29 RX ORDER — DEXTROAMPHETAMINE SACCHARATE, AMPHETAMINE ASPARTATE MONOHYDRATE, DEXTROAMPHETAMINE SULFATE AND AMPHETAMINE SULFATE 5; 5; 5; 5 MG/1; MG/1; MG/1; MG/1
20 CAPSULE, EXTENDED RELEASE ORAL DAILY
Qty: 30 CAPSULE | Refills: 0 | Status: SHIPPED | OUTPATIENT
Start: 2022-09-29 | End: 2022-10-05

## 2022-09-29 RX ORDER — AZITHROMYCIN 250 MG/1
TABLET, FILM COATED ORAL
Qty: 6 TABLET | Refills: 0 | Status: SHIPPED | OUTPATIENT
Start: 2022-09-29 | End: 2023-01-18

## 2022-09-29 NOTE — TELEPHONE ENCOUNTER
Routing refill request to provider for review/approval because:  Drug not on the [facility] refill protocol        Mary NIX RN  [facility] Triage

## 2022-09-29 NOTE — PROGRESS NOTES
Sterling is a 57 year old who is being evaluated via a billable video visit.      How would you like to obtain your AVS? MyChart  If the video visit is dropped, the invitation should be resent by: Text to cell phone: [phone]  Will anyone else be joining your video visit? No          Assessment & Plan     Acute bronchitis with symptoms > 10 days    - predniSONE (DELTASONE) 20 MG tablet; Take 1 tablet (20 mg) by mouth daily  - azithromycin (ZITHROMAX) 250 MG tablet; Two tablets first day, then one tablet daily for four days.    Moderate persistent asthma with acute exacerbation    - predniSONE (DELTASONE) 20 MG tablet; Take 1 tablet (20 mg) by mouth daily  - azithromycin (ZITHROMAX) 250 MG tablet; Two tablets first day, then one tablet daily for four days.    56}     BMI:   Estimated body mass index is 38.25 kg/m  as calculated from the following:    Height as of 3/9/22: 1.829 m (6').    Weight as of 3/9/22: 127.9 kg (282 lb).           No follow-ups on file.    Charlie Kingsley MD  [facility]   Sterling is a 57 year old{presenting for the following health issues:  Nasal Congestion      HPI     Acute Illness  Acute illness concerns: cough   Onset/Duration:  14 days   Symptoms:  Fever: No  Chills/Sweats: No  Headache (location?): No  Sinus Pressure: No  Conjunctivitis:  No  Ear Pain: no  Rhinorrhea: No  Congestion: YES  Sore Throat: No  Cough: YES-productive of yellow sputum  Wheeze: YES  Decreased Appetite: No  Nausea: No  Vomiting: No  Diarrhea: No  Dysuria/Freq.: No  Dysuria or Hematuria: No  Fatigue/Achiness: YES  Sick/Strep Exposure: No  Therapies tried and outcome:  Advil       Review of Systems   Constitutional, HEENT, cardiovascular, pulmonary, gi and gu systems are negative, except as otherwise noted.      Objective           Vitals:  No vitals were obtained today due to virtual visit.    Physical Exam   GENERAL: Healthy, alert and no distress  EYES: Eyes grossly normal to  inspection.  No discharge or erythema, or obvious scleral/conjunctival abnormalities.  RESP: No audible wheeze, cough, or visible cyanosis.  No visible retractions or increased work of breathing.    SKIN: Visible skin clear. No significant rash, abnormal pigmentation or lesions.  NEURO: Cranial nerves grossly intact.  Mentation and speech appropriate for age.  PSYCH: Mentation appears normal, affect normal/bright, judgement and insight intact, normal speech and appearance well-groomed.                Video-Visit Details    Video Start Time: 5:00    Type of service:  Video Visit    Video End Time:5:10    Originating Location (pt. Location): Home    Distant Location (provider location):  [facility]     Platform used for Video Visit: [employer]

## 2022-10-03 ENCOUNTER — TELEPHONE (OUTPATIENT)
Dept: FAMILY MEDICINE | Facility: CLINIC | Age: 57
End: 2022-10-03

## 2022-10-03 DIAGNOSIS — F98.8 ATTENTION DEFICIT DISORDER, UNSPECIFIED HYPERACTIVITY PRESENCE: ICD-10-CM

## 2022-10-03 NOTE — TELEPHONE ENCOUNTER
Is it the Adderall which is not available?  We can either send 10 mg capsules, or send to another pharmacy that has the 20 mg capsules.    Can you find out which will work best and then tell me where to send it?    Rebel Sanchez PA-C

## 2022-10-04 NOTE — TELEPHONE ENCOUNTER
Writer called pharmacy, it is regarding the adderall.      Pharmacist stated- PCP could send rx for 10 mg however, pharmacist cannot guarantee if the insurance will cover this.      Writer called patient, patient is looking around pharmacies to find one that will fill it. -patient is going to call writer back when he finds a pharmacy to send the rx    Yuli Musa/Derian-  [facility]

## 2022-10-04 NOTE — TELEPHONE ENCOUNTER
Pt calling back. [facility]    Pt wants rx sent to (adderall 20mg)  [facility] [address] in [place].     Yuli Musa/Derian-  [facility]

## 2022-10-04 NOTE — TELEPHONE ENCOUNTER
Pt calling back to state that the pharmacy that it was initially sent will be filling the RX. - he will call us back if there is an issue.     Yuli Musa/Derian-  [facility]

## 2022-10-05 RX ORDER — DEXTROAMPHETAMINE SACCHARATE, AMPHETAMINE ASPARTATE MONOHYDRATE, DEXTROAMPHETAMINE SULFATE AND AMPHETAMINE SULFATE 5; 5; 5; 5 MG/1; MG/1; MG/1; MG/1
20 CAPSULE, EXTENDED RELEASE ORAL DAILY
Qty: 30 CAPSULE | Refills: 0 | Status: SHIPPED | OUTPATIENT
Start: 2022-10-05 | End: 2022-11-09

## 2022-11-09 ENCOUNTER — MYC REFILL (OUTPATIENT)
Dept: FAMILY MEDICINE | Facility: CLINIC | Age: 57
End: 2022-11-09

## 2022-11-09 DIAGNOSIS — F98.8 ATTENTION DEFICIT DISORDER, UNSPECIFIED HYPERACTIVITY PRESENCE: ICD-10-CM

## 2022-11-09 DIAGNOSIS — Z79.899 CONTROLLED SUBSTANCE AGREEMENT SIGNED: ICD-10-CM

## 2022-11-09 NOTE — TELEPHONE ENCOUNTER
Routing refill request to provider for review/approval because:  Drug not on the [facility] refill protocol   Briana ROBLES RN  [facility]

## 2022-11-09 NOTE — TELEPHONE ENCOUNTER
Routing refill request to provider for review/approval because:  Drug not on the [facility] refill protocol   Briana ROBLES RN  [facility]

## 2022-11-11 RX ORDER — DEXTROAMPHETAMINE SACCHARATE, AMPHETAMINE ASPARTATE, DEXTROAMPHETAMINE SULFATE AND AMPHETAMINE SULFATE 1.25; 1.25; 1.25; 1.25 MG/1; MG/1; MG/1; MG/1
5 TABLET ORAL DAILY
Qty: 30 TABLET | Refills: 0 | Status: SHIPPED | OUTPATIENT
Start: 2022-11-11 | End: 2023-01-17

## 2022-11-11 RX ORDER — DEXTROAMPHETAMINE SACCHARATE, AMPHETAMINE ASPARTATE MONOHYDRATE, DEXTROAMPHETAMINE SULFATE AND AMPHETAMINE SULFATE 5; 5; 5; 5 MG/1; MG/1; MG/1; MG/1
20 CAPSULE, EXTENDED RELEASE ORAL DAILY
Qty: 30 CAPSULE | Refills: 0 | Status: SHIPPED | OUTPATIENT
Start: 2022-11-11 | End: 2022-12-12

## 2023-01-17 ENCOUNTER — MYC REFILL (OUTPATIENT)
Dept: FAMILY MEDICINE | Facility: CLINIC | Age: 58
End: 2023-01-17
Payer: COMMERCIAL

## 2023-01-17 DIAGNOSIS — F98.8 ATTENTION DEFICIT DISORDER, UNSPECIFIED HYPERACTIVITY PRESENCE: ICD-10-CM

## 2023-01-18 ENCOUNTER — VIRTUAL VISIT (OUTPATIENT)
Dept: FAMILY MEDICINE | Facility: CLINIC | Age: 58
End: 2023-01-18
Payer: COMMERCIAL

## 2023-01-18 DIAGNOSIS — J45.40 MODERATE PERSISTENT ASTHMA, UNCOMPLICATED: ICD-10-CM

## 2023-01-18 DIAGNOSIS — J45.41 MODERATE PERSISTENT ASTHMA WITH ACUTE EXACERBATION: Primary | ICD-10-CM

## 2023-01-18 DIAGNOSIS — J20.9 ACUTE BRONCHITIS WITH SYMPTOMS > 10 DAYS: ICD-10-CM

## 2023-01-18 PROCEDURE — 99214 OFFICE O/P EST MOD 30 MIN: CPT | Mod: 95 | Performed by: FAMILY MEDICINE

## 2023-01-18 RX ORDER — AZITHROMYCIN 250 MG/1
TABLET, FILM COATED ORAL
Qty: 6 TABLET | Refills: 0 | Status: SHIPPED | OUTPATIENT
Start: 2023-01-18 | End: 2023-04-26

## 2023-01-18 RX ORDER — DEXTROAMPHETAMINE SACCHARATE, AMPHETAMINE ASPARTATE MONOHYDRATE, DEXTROAMPHETAMINE SULFATE AND AMPHETAMINE SULFATE 5; 5; 5; 5 MG/1; MG/1; MG/1; MG/1
20 CAPSULE, EXTENDED RELEASE ORAL DAILY
Qty: 30 CAPSULE | Refills: 0 | OUTPATIENT
Start: 2023-01-18

## 2023-01-18 RX ORDER — ALBUTEROL SULFATE 90 UG/1
2 AEROSOL, METERED RESPIRATORY (INHALATION) EVERY 6 HOURS PRN
Qty: 18 G | Refills: 3 | Status: SHIPPED | OUTPATIENT
Start: 2023-01-18

## 2023-01-18 RX ORDER — PREDNISONE 20 MG/1
20 TABLET ORAL DAILY
Qty: 5 TABLET | Refills: 0 | Status: SHIPPED | OUTPATIENT
Start: 2023-01-18 | End: 2023-04-26

## 2023-01-18 NOTE — PROGRESS NOTES
Sterling is a 57 year old who is being evaluated via a billable video visit.      How would you like to obtain your AVS? MyChart  If the video visit is dropped, the invitation should be resent by: Text to cell phone: [phone]  Will anyone else be joining your video visit? No          Assessment & Plan     Moderate persistent asthma with acute exacerbation    - azithromycin (ZITHROMAX) 250 MG tablet; Two tablets first day, then one tablet daily for four days.  - predniSONE (DELTASONE) 20 MG tablet; Take 1 tablet (20 mg) by mouth daily    Acute bronchitis with symptoms > 10 days  Patient has upper respiratory symptoms including bronchitis.  He has a history of asthma as well we will start him on prednisone and azithromycin follow-up if continued symptoms.  - azithromycin (ZITHROMAX) 250 MG tablet; Two tablets first day, then one tablet daily for four days.  - predniSONE (DELTASONE) 20 MG tablet; Take 1 tablet (20 mg) by mouth daily    Moderate persistent asthma, uncomplicated    - albuterol (PROAIR HFA/PROVENTIL HFA/VENTOLIN HFA) 108 (90 Base) MCG/ACT inhaler; Inhale 2 puffs into the lungs every 6 hours as needed for shortness of breath or wheezing      BMI:   Estimated body mass index is 38.25 kg/m  as calculated from the following:    Height as of 3/9/22: 1.829 m (6').    Weight as of 3/9/22: 127.9 kg (282 lb).     Charlie Kingsley MD  [facility]   Sterling is a 57 year old, presenting for the following health issues:  No chief complaint on file.      HPI     Acute Illness  Acute illness concerns: Cold   Onset/Duration: 2 weeks   Symptoms:  Fever: No  Chills/Sweats: No  Headache (location?): No  Sinus Pressure: YES  Conjunctivitis:  No  Ear Pain: no  Rhinorrhea: No  Congestion: YES  Sore Throat: No  Cough: YES-productive of yellow sputum  Wheeze: YES  Decreased Appetite: No  Nausea: No  Vomiting: No  Diarrhea: No      Therapies tried and outcome: None      Review of Systems    Constitutional, HEENT, cardiovascular, pulmonary, gi and gu systems are negative, except as otherwise noted.      Objective           Vitals:  No vitals were obtained today due to virtual visit.    Physical Exam   GENERAL: Healthy, alert and no distress  EYES: Eyes grossly normal to inspection.  No discharge or erythema, or obvious scleral/conjunctival abnormalities.  RESP: No audible wheeze, cough, or visible cyanosis.  No visible retractions or increased work of breathing.    SKIN: Visible skin clear. No significant rash, abnormal pigmentation or lesions.  NEURO: Cranial nerves grossly intact.  Mentation and speech appropriate for age.  PSYCH: Mentation appears normal, affect normal/bright, judgement and insight intact, normal speech and appearance well-groomed.            Video-Visit Details    Type of service:  Video Visit     Originating Location (pt. Location):    Distant Location (provider location):  On-site  Platform used for Video Visit: [employer]

## 2023-01-18 NOTE — TELEPHONE ENCOUNTER
Script refill  was faxed already , with date changed to 1/ 25, as that will e the date he will run out based on previous refill

## 2023-01-20 ENCOUNTER — MYC MEDICAL ADVICE (OUTPATIENT)
Dept: FAMILY MEDICINE | Facility: CLINIC | Age: 58
End: 2023-01-20
Payer: COMMERCIAL

## 2023-01-20 DIAGNOSIS — F98.8 ATTENTION DEFICIT DISORDER, UNSPECIFIED HYPERACTIVITY PRESENCE: ICD-10-CM

## 2023-01-20 RX ORDER — DEXTROAMPHETAMINE SACCHARATE, AMPHETAMINE ASPARTATE MONOHYDRATE, DEXTROAMPHETAMINE SULFATE AND AMPHETAMINE SULFATE 5; 5; 5; 5 MG/1; MG/1; MG/1; MG/1
20 CAPSULE, EXTENDED RELEASE ORAL DAILY
Qty: 30 CAPSULE | Refills: 0 | Status: SHIPPED | OUTPATIENT
Start: 2023-01-20 | End: 2023-01-30

## 2023-01-20 NOTE — TELEPHONE ENCOUNTER
"Patient calling clinic reporting he has taken his last adderall today and needs to have script refilled. Patient expressed frustrations with trying to get PCP to respond to various messages and script refills, stating \"I have little nita this will get done any time soon, she (PCP) is the hardest person to get a hold of right now\".     Script and pharmacy pended. Routing to provider for review.   "

## 2023-01-30 ENCOUNTER — TELEPHONE (OUTPATIENT)
Dept: FAMILY MEDICINE | Facility: CLINIC | Age: 58
End: 2023-01-30
Payer: COMMERCIAL

## 2023-01-30 DIAGNOSIS — Z79.899 CONTROLLED SUBSTANCE AGREEMENT SIGNED: ICD-10-CM

## 2023-01-30 DIAGNOSIS — F98.8 ATTENTION DEFICIT DISORDER, UNSPECIFIED HYPERACTIVITY PRESENCE: ICD-10-CM

## 2023-01-30 RX ORDER — DEXTROAMPHETAMINE SACCHARATE, AMPHETAMINE ASPARTATE, DEXTROAMPHETAMINE SULFATE AND AMPHETAMINE SULFATE 1.25; 1.25; 1.25; 1.25 MG/1; MG/1; MG/1; MG/1
5 TABLET ORAL DAILY
Qty: 30 TABLET | Refills: 0 | Status: SHIPPED | OUTPATIENT
Start: 2023-01-30 | End: 2023-02-28

## 2023-01-30 RX ORDER — DEXTROAMPHETAMINE SACCHARATE, AMPHETAMINE ASPARTATE MONOHYDRATE, DEXTROAMPHETAMINE SULFATE AND AMPHETAMINE SULFATE 5; 5; 5; 5 MG/1; MG/1; MG/1; MG/1
20 CAPSULE, EXTENDED RELEASE ORAL DAILY
Qty: 30 CAPSULE | Refills: 0 | Status: SHIPPED | OUTPATIENT
Start: 2023-01-30 | End: 2023-03-13

## 2023-01-30 NOTE — TELEPHONE ENCOUNTER
Called pt, left voicemail requesting callback.  Please relay info below if pt calls back.    Sandar JEFFERY    [facility]

## 2023-01-30 NOTE — TELEPHONE ENCOUNTER
Patient calling to state his pharmacy does not carry Adderall 20 mg or 5 mg tablets. Patient requests prescriptions be sent to updated pharmacy, which he reports does have medication currently in stock.     Pharmacy pended.       Patient would like call back once provider sends rx to updated pharmacy. Okay to leave detailed message.     Patient needs rx sent today as he reports his health insurance will be changing tomorrow.       Raya Castro RN BSN MSN  [facility]

## 2023-01-31 NOTE — TELEPHONE ENCOUNTER
Pt calling back. Advised that rx had been sent to pharmacy as stated below. Pt denies further questions or concerns at this time      Francia Cisse RN [facility] January 30, 2023 6:15 PM

## 2023-02-28 ENCOUNTER — MYC REFILL (OUTPATIENT)
Dept: FAMILY MEDICINE | Facility: CLINIC | Age: 58
End: 2023-02-28
Payer: COMMERCIAL

## 2023-02-28 DIAGNOSIS — Z79.899 CONTROLLED SUBSTANCE AGREEMENT SIGNED: ICD-10-CM

## 2023-02-28 DIAGNOSIS — F98.8 ATTENTION DEFICIT DISORDER, UNSPECIFIED HYPERACTIVITY PRESENCE: ICD-10-CM

## 2023-02-28 RX ORDER — DEXTROAMPHETAMINE SACCHARATE, AMPHETAMINE ASPARTATE, DEXTROAMPHETAMINE SULFATE AND AMPHETAMINE SULFATE 1.25; 1.25; 1.25; 1.25 MG/1; MG/1; MG/1; MG/1
5 TABLET ORAL DAILY
Qty: 30 TABLET | Refills: 0 | Status: SHIPPED | OUTPATIENT
Start: 2023-02-28 | End: 2023-04-06

## 2023-03-13 ENCOUNTER — MYC REFILL (OUTPATIENT)
Dept: FAMILY MEDICINE | Facility: CLINIC | Age: 58
End: 2023-03-13
Payer: COMMERCIAL

## 2023-03-13 DIAGNOSIS — F98.8 ATTENTION DEFICIT DISORDER, UNSPECIFIED HYPERACTIVITY PRESENCE: ICD-10-CM

## 2023-03-15 RX ORDER — DEXTROAMPHETAMINE SACCHARATE, AMPHETAMINE ASPARTATE MONOHYDRATE, DEXTROAMPHETAMINE SULFATE AND AMPHETAMINE SULFATE 5; 5; 5; 5 MG/1; MG/1; MG/1; MG/1
20 CAPSULE, EXTENDED RELEASE ORAL DAILY
Qty: 30 CAPSULE | Refills: 0 | Status: SHIPPED | OUTPATIENT
Start: 2023-03-15 | End: 2023-05-08

## 2023-03-15 NOTE — TELEPHONE ENCOUNTER
Called pt, and LVM requesting callback to schedule appointment.    Sandra JEFFERY    [facility]

## 2023-04-06 ENCOUNTER — MYC REFILL (OUTPATIENT)
Dept: FAMILY MEDICINE | Facility: CLINIC | Age: 58
End: 2023-04-06
Payer: COMMERCIAL

## 2023-04-06 DIAGNOSIS — Z79.899 CONTROLLED SUBSTANCE AGREEMENT SIGNED: ICD-10-CM

## 2023-04-06 DIAGNOSIS — F98.8 ATTENTION DEFICIT DISORDER, UNSPECIFIED HYPERACTIVITY PRESENCE: ICD-10-CM

## 2023-04-12 RX ORDER — DEXTROAMPHETAMINE SACCHARATE, AMPHETAMINE ASPARTATE, DEXTROAMPHETAMINE SULFATE AND AMPHETAMINE SULFATE 1.25; 1.25; 1.25; 1.25 MG/1; MG/1; MG/1; MG/1
5 TABLET ORAL DAILY
Qty: 30 TABLET | Refills: 0 | Status: SHIPPED | OUTPATIENT
Start: 2023-04-12 | End: 2023-07-07

## 2023-04-12 NOTE — TELEPHONE ENCOUNTER
Script refill faxed. Remind pt to do follow up for yearly routine physical /  and labs, since he is past  due.

## 2023-04-15 ENCOUNTER — HEALTH MAINTENANCE LETTER (OUTPATIENT)
Age: 58
End: 2023-04-15

## 2023-04-25 ASSESSMENT — ENCOUNTER SYMPTOMS
FEVER: 0
DIARRHEA: 0
HEMATURIA: 0
SHORTNESS OF BREATH: 0
CONSTIPATION: 0
SORE THROAT: 0
MYALGIAS: 0
ABDOMINAL PAIN: 0
WEAKNESS: 0
DYSURIA: 0
FREQUENCY: 0
HEADACHES: 0
CHILLS: 0
NAUSEA: 0
NERVOUS/ANXIOUS: 0
PARESTHESIAS: 0
PALPITATIONS: 0
HEARTBURN: 0
JOINT SWELLING: 0
COUGH: 1
HEMATOCHEZIA: 0
EYE PAIN: 0
DIZZINESS: 0
ARTHRALGIAS: 0

## 2023-04-25 ASSESSMENT — ASTHMA QUESTIONNAIRES
QUESTION_4 LAST FOUR WEEKS HOW OFTEN HAVE YOU USED YOUR RESCUE INHALER OR NEBULIZER MEDICATION (SUCH AS ALBUTEROL): THREE OR MORE TIMES PER DAY
ACT_TOTALSCORE: 20
ACT_TOTALSCORE: 20
QUESTION_3 LAST FOUR WEEKS HOW OFTEN DID YOUR ASTHMA SYMPTOMS (WHEEZING, COUGHING, SHORTNESS OF BREATH, CHEST TIGHTNESS OR PAIN) WAKE YOU UP AT NIGHT OR EARLIER THAN USUAL IN THE MORNING: NOT AT ALL
QUESTION_1 LAST FOUR WEEKS HOW MUCH OF THE TIME DID YOUR ASTHMA KEEP YOU FROM GETTING AS MUCH DONE AT WORK, SCHOOL OR AT HOME: NONE OF THE TIME
QUESTION_2 LAST FOUR WEEKS HOW OFTEN HAVE YOU HAD SHORTNESS OF BREATH: NOT AT ALL
QUESTION_5 LAST FOUR WEEKS HOW WOULD YOU RATE YOUR ASTHMA CONTROL: WELL CONTROLLED

## 2023-04-26 ENCOUNTER — OFFICE VISIT (OUTPATIENT)
Dept: FAMILY MEDICINE | Facility: CLINIC | Age: 58
End: 2023-04-26
Payer: COMMERCIAL

## 2023-04-26 VITALS
SYSTOLIC BLOOD PRESSURE: 120 MMHG | HEART RATE: 60 BPM | BODY MASS INDEX: 34.75 KG/M2 | DIASTOLIC BLOOD PRESSURE: 70 MMHG | TEMPERATURE: 96.5 F | OXYGEN SATURATION: 99 % | WEIGHT: 262.2 LBS | HEIGHT: 73 IN

## 2023-04-26 DIAGNOSIS — J45.40 MODERATE PERSISTENT ASTHMA, UNCOMPLICATED: ICD-10-CM

## 2023-04-26 DIAGNOSIS — E66.812 OBESITY, CLASS II, BMI 35-39.9, NO COMORBIDITY: ICD-10-CM

## 2023-04-26 DIAGNOSIS — J30.89 SEASONAL ALLERGIC RHINITIS DUE TO OTHER ALLERGIC TRIGGER: ICD-10-CM

## 2023-04-26 DIAGNOSIS — R73.01 ELEVATED FASTING GLUCOSE: ICD-10-CM

## 2023-04-26 DIAGNOSIS — N52.9 ERECTILE DYSFUNCTION, UNSPECIFIED ERECTILE DYSFUNCTION TYPE: ICD-10-CM

## 2023-04-26 DIAGNOSIS — Z00.00 ROUTINE HISTORY AND PHYSICAL EXAMINATION OF ADULT: Primary | ICD-10-CM

## 2023-04-26 PROCEDURE — 99396 PREV VISIT EST AGE 40-64: CPT | Performed by: FAMILY MEDICINE

## 2023-04-26 PROCEDURE — 99213 OFFICE O/P EST LOW 20 MIN: CPT | Mod: 25 | Performed by: FAMILY MEDICINE

## 2023-04-26 RX ORDER — MONTELUKAST SODIUM 10 MG/1
1 TABLET ORAL AT BEDTIME
Qty: 90 TABLET | Refills: 2 | Status: SHIPPED | OUTPATIENT
Start: 2023-04-26 | End: 2023-10-30

## 2023-04-26 RX ORDER — FLUTICASONE PROPIONATE 50 MCG
1-2 SPRAY, SUSPENSION (ML) NASAL DAILY
Qty: 1 G | Refills: 11 | Status: SHIPPED | OUTPATIENT
Start: 2023-04-26 | End: 2023-10-30

## 2023-04-26 RX ORDER — FLUTICASONE PROPIONATE AND SALMETEROL 500; 50 UG/1; UG/1
1 POWDER RESPIRATORY (INHALATION) 2 TIMES DAILY
Qty: 3 EACH | Refills: 1 | Status: SHIPPED | OUTPATIENT
Start: 2023-04-26 | End: 2023-10-30

## 2023-04-26 RX ORDER — SILDENAFIL 25 MG/1
25-50 TABLET, FILM COATED ORAL DAILY PRN
Qty: 30 TABLET | Refills: 1 | Status: SHIPPED | OUTPATIENT
Start: 2023-04-26 | End: 2023-10-30

## 2023-04-26 ASSESSMENT — PAIN SCALES - GENERAL: PAINLEVEL: NO PAIN (0)

## 2023-04-26 NOTE — PROGRESS NOTES
SUBJECTIVE:   CC: Sterling is an 58 year old who presents for preventative health visit.       4/26/2023     8:22 AM   Additional Questions   Roomed by guerline   Patient has been advised of split billing requirements and indicates understanding: Yes  Healthy Habits:     Getting at least 3 servings of Calcium per day:  Yes    Bi-annual eye exam:  Yes    Dental care twice a year:  Yes    Sleep apnea or symptoms of sleep apnea:  Sleep apnea    Diet:  Regular (no restrictions)    Frequency of exercise:  4-5 days/week    Duration of exercise:  30-45 minutes    Taking medications regularly:  Yes    Medication side effects:  None    PHQ-2 Total Score: 0    Additional concerns today:  No          PROBLEMS TO ADD ON...          How are you doing with your ADD since your last visit? No change    Are you having other symptoms that might be associated with depression? No    Have you had a significant life event?  No     Are you having any problem taking medication ?   No    Do you have any concerns with your use of alcohol or other drugs? No    Asthma Follow-Up    Was ACT completed today?  Yes        4/25/2023    11:02 AM   ACT Total Scores   ACT TOTAL SCORE (Goal Greater than or Equal to 20) 20   In the past 12 months, how many times did you visit the emergency room for your asthma without being admitted to the hospital? 0   In the past 12 months, how many times were you hospitalized overnight because of your asthma? 0          How many days per week do you miss taking your asthma controller medication?  0    Please describe any recent triggers for your asthma: None    Have you had any Emergency Room Visits, Urgent Care Visits, or Hospital Admissions since your last office visit?  No      Today's PHQ-2 Score:       4/25/2023    11:00 AM   PHQ-2 ( 1999 [employer])   Q1: Little interest or pleasure in doing things 0   Q2: Feeling down, depressed or hopeless 0   PHQ-2 Score 0   Q1: Little interest or pleasure in doing things Not at all     Not at all   Q2: Feeling down, depressed or hopeless Not at all    Not at all   PHQ-2 Score 0    0           Social History     Tobacco Use     Smoking status: Former     Packs/day: 0.50     Years: 20.00     Pack years: 10.00     Types: Cigarettes     Quit date: 3/26/2001     Years since quittin.0     Smokeless tobacco: Never   Vaping Use     Vaping status: Not on file   Substance Use Topics     Alcohol use: Yes     Alcohol/week: 0.0 standard drinks of alcohol     Comment: 2 a week             2023    11:00 AM   Alcohol Use   Prescreen: >3 drinks/day or >7 drinks/week? No       Last PSA:   PSA   Date Value Ref Range Status   2020 0.42 0 - 4 ug/L Final     Comment:     Assay Method:  Chemiluminescence using Siemens Vista analyzer     Prostate Specific Antigen Screen   Date Value Ref Range Status   2022 0.31 0.00 - 4.00 ug/L Final       Reviewed orders with patient. Reviewed health maintenance and updated orders accordingly - Yes  Patient Active Problem List   Diagnosis     LFT elevation     Difficulty concentrating     Nevus     CARDIOVASCULAR SCREENING; LDL GOAL LESS THAN 160     Erectile dysfunction     Common wart     Achilles tendonitis     Anal fissure     Obesity     History of colonic polyps     ADD (attention deficit disorder)     Controlled substance agreement signed     Seasonal allergic rhinitis, unspecified allergic rhinitis trigger     Moderate persistent asthma without complication     BMI 38.0-38.9,adult     Obesity, unspecified obesity severity, unspecified obesity type     Abnormal thyroid blood test     Elevated fasting glucose     Morbid obesity (H)     Screening PSA (prostate specific antigen)     Past Surgical History:   Procedure Laterality Date     COLONOSCOPY  2015     ENT SURGERY  2005    Deviated septum     ORTHOPEDIC SURGERY  2017    Repair torn meniscus     VASECTOMY       ZZC NONSPECIFIC PROCEDURE  2004    repair deviated septum     ZZHC REPAIR  INCISIONAL HERNIA,REDUCIBLE  1997    Hernia Repair,  inguinal, Unilateral x2 on rt.       Social History     Tobacco Use     Smoking status: Former     Packs/day: 0.50     Years: 20.00     Pack years: 10.00     Types: Cigarettes     Quit date: 3/26/2001     Years since quittin.0     Smokeless tobacco: Never   Vaping Use     Vaping status: Not on file   Substance Use Topics     Alcohol use: Yes     Alcohol/week: 0.0 standard drinks of alcohol     Comment: 2 a week     Family History   Problem Relation Age of Onset     Hypertension Paternal Grandmother      C.A.D. Paternal Grandmother      Arthritis Paternal Grandmother      Diabetes No family hx of      Prostate Cancer No family hx of      Cancer - colorectal No family hx of            Reviewed and updated as needed this visit by clinical staff   Tobacco  Allergies  Meds              Reviewed and updated as needed this visit by Provider                 Past Medical History:   Diagnosis Date     Allergic rhinitis, cause unspecified      Contact dermatitis and other eczema, due to unspecified cause     nummular/asteatotic     Herpes zoster 2015    right ear and neck     Moderate persistent asthma      Unspecified sinusitis (chronic)      Unspecified sleep apnea     C-Pap        CONSTITUTIONAL: NEGATIVE for fever, chills has noted  change in weight and lost 20 lbs as he is trying to loose weight and feeling well   INTEGUMENTARY/SKIN: NEGATIVE for worrisome rashes, moles or lesions  EYES: NEGATIVE for vision changes or irritation  ENT: NEGATIVE for ear, mouth and throat problems  RESP: NEGATIVE for significant cough or SOB  CV: NEGATIVE for chest pain, palpitations or peripheral edema  GI: NEGATIVE for nausea, abdominal pain, heartburn, or change in bowel habits   male: negative for dysuria, hematuria, decreased urinary stream,  urethral discharge except erectile dysfunction, and Viagra works well for him   MUSCULOSKELETAL: NEGATIVE for significant  "arthralgias or myalgia  NEURO: NEGATIVE for weakness, dizziness or paresthesias  ENDOCRINE: NEGATIVE for temperature intolerance, skin/hair changes  HEME/ALLERGY/IMMUNE: NEGATIVE for bleeding problems  PSYCHIATRIC: NEGATIVE for changes in mood or affect    OBJECTIVE:   BP (!) 144/83   Pulse 60   Temp (!) 96.5  F (35.8  C) (Temporal)   Ht 1.842 m (6' 0.5\")   Wt 118.9 kg (262 lb 3.2 oz)   SpO2 99%   BMI 35.07 kg/m      Physical Exam  GENERAL: healthy, alert and no distress  EYES: Eyes grossly normal to inspection, PERRL and conjunctivae and sclerae normal  HENT: ear canals and TM's normal, nose and mouth without ulcers or lesions  NECK: no adenopathy, no asymmetry, masses, or scars and thyroid normal to palpation  RESP: lungs clear to auscultation - no rales, rhonchi or wheezes  CV: regular rate and rhythm, normal S1 S2, no S3 or S4, no murmur, click or rub,  ABDOMEN: soft, nontender, no hepatosplenomegaly, no masses and bowel sounds normal   (male): testicles normal without atrophy or masses, no hernias and penis normal without urethral discharge  RECTAL: normal sphincter tone, no rectal masses and prostate of normal size for age, smooth, nontender without masses/nodules  MS: no gross musculoskeletal defects noted, no edema  SKIN: no suspicious lesions or rashes  NEURO: Normal strength and tone, mentation intact and speech normal  PSYCH: mentation appears normal, affect normal/bright        ASSESSMENT/PLAN:   (Z00.00) Routine history and physical examination of adult  (primary encounter diagnosis)  Comment:   Plan: PSA, screen, Lipid panel reflex to direct LDL         Fasting            (J45.40) Moderate persistent asthma, uncomplicated  Comment:   Plan: montelukast (SINGULAIR) 10 MG tablet,         fluticasone-salmeterol (WIXELA INHUB) 500-50         MCG/ACT inhaler        Discussed allergy/ asthma and treatment. Willing to continue with  Singular/ inhalers/ allergy med's as needed .   Refill given on med's. " "Need to monitor the need for albuterol . Cares and symptomatic treatment discussed follow up  In 4 -6 months , sooner if problem      (J30.89) Seasonal allergic rhinitis due to other allergic trigger  Comment: stable   Plan: fluticasone (FLONASE) 50 MCG/ACT nasal spray            (N52.9) Erectile dysfunction, unspecified erectile dysfunction type  Comment: stable needs prn med's   Plan: sildenafil (VIAGRA) 25 MG tablet            (R73.01) Elevated fasting glucose  Comment:   Plan: Hemoglobin A1c    (E66.9) Obesity, Class II, BMI 35-39.9, no comorbidity  Comment: improved to 35 ( from 38) ,   Plan:         Healthy diet and exercise reviewed. Risks of obesity discussed.  Encourage exercise.      Check labs. refill sent.Cares and  treatment discussed.  follow up if problem   Patient expressed understanding and agreement with treatment plan. All patient's questions were answered, will let me know if has more later.  Medications: Rx's: Reviewed the potential side effects/complications of medications prescribed.         COUNSELING:   Reviewed preventive health counseling, as reflected in patient instructions       Regular exercise       Healthy diet/nutrition       Vision screening       Immunizations    Declined: Pneumococcal and Zoster due to Other will consider                Alcohol Use        Colorectal cancer screening       Prostate cancer screening       Advance Care Planning      BMI:   Estimated body mass index is 35.07 kg/m  as calculated from the following:    Height as of this encounter: 1.842 m (6' 0.5\").    Weight as of this encounter: 118.9 kg (262 lb 3.2 oz).   Weight management plan: Discussed healthy diet and exercise guidelines      He reports that he quit smoking about 22 years ago. His smoking use included cigarettes. He has a 10.00 pack-year smoking history. He has never used smokeless tobacco.        Kay Ramirez MD  [facility] [place]  Answers for HPI/ROS submitted by " the patient on 4/25/2023  abdominal pain: No  Blood in stool: No  Blood in urine: No  chest pain: No  chills: No  congestion: No  constipation: No  cough: Yes  diarrhea: No  dizziness: No  ear pain: No  eye pain: No  nervous/anxious: No  fever: No  frequency: No  genital sores: No  headaches: No  hearing loss: No  heartburn: No  arthralgias: No  joint swelling: No  peripheral edema: No  mood changes: No  myalgias: No  nausea: No  dysuria: No  palpitations: No  Skin sensation changes: No  sore throat: No  urgency: No  rash: No  shortness of breath: No  visual disturbance: No  weakness: No  impotence: Yes  penile discharge: No

## 2023-04-26 NOTE — LETTER
[facility]  -- Controlled Medication Agreement    12/5/2016   Orlando Holder   1965   [id number]       I understand that my provider is prescribing controlled medications to assist me in managing my ADHD.  The risks, benefits, and side effects of these medications have been explained to me and I agree to the following conditions for this type of treatment.    Stimulant Medication Prescribed: ADDERALL    1.  I will take my medications exactly as prescribed and will not change the medication dosage or schedule without my provider's approval.  Refills will not be given if I  runs out early.     2.  I will keep all regular appointments at this clinic.  If there are three or more missed appointments or appointments canceled less than 2 hours before the scheduled time, my medication may be discontinued.    3.  I understand that prescriptions may only be written for one month at a time, and a written prescription is required each month.  Prescriptions cannot be called in or faxed to the pharmacy.    4.  If the prescription is lost or stolen, replacement is at the discretion of my provider.  I understand that this may mean the prescription might not be replaced.    5.  If I am late for scheduled follow up, I understand that I must make an appointment and that another refill is at the discretion of my provider.  This may mean a prescription for only the amount required until the appointment, regardless of prescription co-pay.  For example, if an appointment is made in 1 week, a prescription might only be written for 7 pills.      I understand that if I violate any of the above conditions, my prescription medications and/or treatment may be terminated.  If the violation includes providing controlled substances to anyone other than to whom the medication is prescribed, a report may be made to my child's physician, pharmacy, and other authorities, including the police.    I have read this contract and it has  been explained to me.  I fully understand the consequences of violating this agreement.    _________________________________/______________/____________________________    Patient signature/Date/Witness

## 2023-04-28 ENCOUNTER — TELEPHONE (OUTPATIENT)
Dept: FAMILY MEDICINE | Facility: CLINIC | Age: 58
End: 2023-04-28
Payer: COMMERCIAL

## 2023-04-28 DIAGNOSIS — J45.40 MODERATE PERSISTENT ASTHMA, UNCOMPLICATED: ICD-10-CM

## 2023-04-28 RX ORDER — FLUTICASONE PROPIONATE AND SALMETEROL 500; 50 UG/1; UG/1
1 POWDER RESPIRATORY (INHALATION) 2 TIMES DAILY
Qty: 3 EACH | Refills: 1 | OUTPATIENT
Start: 2023-04-28

## 2023-05-01 ENCOUNTER — TELEPHONE (OUTPATIENT)
Dept: FAMILY MEDICINE | Facility: CLINIC | Age: 58
End: 2023-05-01

## 2023-05-03 RX ORDER — FLUTICASONE PROPIONATE AND SALMETEROL 500; 50 UG/1; UG/1
1 POWDER RESPIRATORY (INHALATION) EVERY 12 HOURS
Status: CANCELLED | OUTPATIENT
Start: 2023-05-03

## 2023-05-04 RX ORDER — BUDESONIDE AND FORMOTEROL FUMARATE DIHYDRATE 160; 4.5 UG/1; UG/1
2 AEROSOL RESPIRATORY (INHALATION) 2 TIMES DAILY
Qty: 10.2 G | Refills: 4 | Status: SHIPPED | OUTPATIENT
Start: 2023-05-04

## 2023-05-05 NOTE — TELEPHONE ENCOUNTER
Medication Question or Refill        What medication are you calling about (include dose and sig)?: fluticasone-salmeterol (WIXELA INHUB) 500-50 MCG/ACT inhaler       Preferred Pharmacy:    [id number] - [place], [place] - [address]  [address]  [place] [address]  Phone: [phone] Fax: [phone]      Controlled Substance Agreement on file:   CSA -- Patient Level:     [Media Unavailable] Controlled Substance Agreement - Opioid - Scan on 4/26/2023  3:08 PM   [Media Unavailable] Controlled Substance Agreement - Opioid - Scan on 12/6/2020  3:18 PM       Who prescribed the medication?: James    Do you need a refill? Yes    When did you use the medication last? unknown    Patient offered an appointment? No    Do you have any questions or concerns?  Yes: received fax from pharmacy, alternative requested:  Not on formulary, please send alternative.    Sandra JEFFERY    [facility]            
.sript faxed. Tell pt toned to monitor sx control with change to make sure this is working as effectively , my need follow  if problem   
Patient Contact     Attempt # 1     Was call answered?    No  Left non-detailed message to call the clinic back at [phone]. Front Row message sent as well.     On call back:      -Pt reach out to insurance to inquire what they cover.     Briana ROBLES RN  [facility]   
Patient Contact     S/w pt and relayed message that an alternative was sent in by PCP and note below. He states Symbicort is expensive with his plan. His insurance does not cover Advair and Wixela but should cover the ingredients in them (generic). His insurance does not give recommendations on alternatives but the pharmacy will contact him about generics. He states the pharmacy is going to return a call to him and let him know what should be sent in. He states he will notify the clinic if something different needs to be sent.    Briana ROBLES RN  [facility]   
Patient Contact    Attempt # 1    Was call answered?  No.  Left message on voicemail with information to call triage back at [phone], option 2.     On call back:    Relay PCP message below    Mary NIX RN  [facility] Triage Team      
Please verify other alternate covered , Advair? Q mario alberto?   
Routing to provider.  Can an alternative be sent for Wixela?  
Spoke with patient. He is going to check with his insurance and work with pharmacy to see what medication alternative for Wixelainhub is covered.       Mary Diaz RN  [facility]     
Normal rate, regular rhythm, normal S1, S2 heart sounds heard.

## 2023-05-08 DIAGNOSIS — J45.40 MODERATE PERSISTENT ASTHMA, UNCOMPLICATED: ICD-10-CM

## 2023-05-08 NOTE — TELEPHONE ENCOUNTER
budesonide-formoterol (SYMBICORT) 160-4.5 MCG/ACT Inhaler - RX is 376.00 and pt would like less expensive alternative. Suggesting AIRDUO THE GENERIC ALTERNATIVE FOR APPROX 45.00 . iF INTERESTED PLEASE SEND a  NEW RX    Taylor ALATORRE RN  [facility] Triage Team

## 2023-05-09 RX ORDER — BUDESONIDE AND FORMOTEROL FUMARATE DIHYDRATE 160; 4.5 UG/1; UG/1
2 AEROSOL RESPIRATORY (INHALATION) 2 TIMES DAILY
Qty: 10.2 G | Refills: 4 | Status: CANCELLED | OUTPATIENT
Start: 2023-05-09

## 2023-05-09 RX ORDER — FLUTICASONE PROPIONATE AND SALMETEROL 113; 14 UG/1; UG/1
1 POWDER, METERED RESPIRATORY (INHALATION) 2 TIMES DAILY
Qty: 1 EACH | Refills: 3 | Status: SHIPPED | OUTPATIENT
Start: 2023-05-09 | End: 2023-10-30

## 2023-05-09 NOTE — TELEPHONE ENCOUNTER
New script faxed.   Have pt switch to this, follow up if any problem or concerns with asthma control   after switching to  this brand

## 2023-05-10 NOTE — TELEPHONE ENCOUNTER
Patient Contact     S/w pt and relayed message from Dr. Ramirez, see below. He stated his understanding and appreciation.    Briana ROBLES RN  [facility]

## 2023-07-07 DIAGNOSIS — Z79.899 CONTROLLED SUBSTANCE AGREEMENT SIGNED: ICD-10-CM

## 2023-07-07 DIAGNOSIS — F98.8 ATTENTION DEFICIT DISORDER, UNSPECIFIED HYPERACTIVITY PRESENCE: ICD-10-CM

## 2023-07-07 RX ORDER — DEXTROAMPHETAMINE SACCHARATE, AMPHETAMINE ASPARTATE MONOHYDRATE, DEXTROAMPHETAMINE SULFATE AND AMPHETAMINE SULFATE 5; 5; 5; 5 MG/1; MG/1; MG/1; MG/1
20 CAPSULE, EXTENDED RELEASE ORAL DAILY
Qty: 30 CAPSULE | Refills: 0 | Status: SHIPPED | OUTPATIENT
Start: 2023-07-07 | End: 2023-09-25

## 2023-07-07 RX ORDER — DEXTROAMPHETAMINE SACCHARATE, AMPHETAMINE ASPARTATE, DEXTROAMPHETAMINE SULFATE AND AMPHETAMINE SULFATE 1.25; 1.25; 1.25; 1.25 MG/1; MG/1; MG/1; MG/1
5 TABLET ORAL DAILY
Qty: 30 TABLET | Refills: 0 | Status: SHIPPED | OUTPATIENT
Start: 2023-07-07 | End: 2023-09-25

## 2023-07-07 NOTE — TELEPHONE ENCOUNTER
Patient called needing a refill of his Adderall. Patient stated that he is currently out of the medication. Routing high priority to EC providers.    Taylor ALATORRE RN  [facility] Triage Team

## 2023-09-25 ENCOUNTER — MYC REFILL (OUTPATIENT)
Dept: FAMILY MEDICINE | Facility: CLINIC | Age: 58
End: 2023-09-25

## 2023-09-25 ENCOUNTER — E-VISIT (OUTPATIENT)
Dept: URGENT CARE | Facility: CLINIC | Age: 58
End: 2023-09-25
Payer: COMMERCIAL

## 2023-09-25 DIAGNOSIS — Z79.899 CONTROLLED SUBSTANCE AGREEMENT SIGNED: ICD-10-CM

## 2023-09-25 DIAGNOSIS — F98.8 ATTENTION DEFICIT DISORDER, UNSPECIFIED HYPERACTIVITY PRESENCE: ICD-10-CM

## 2023-09-25 DIAGNOSIS — R05.1 ACUTE COUGH: Primary | ICD-10-CM

## 2023-09-25 PROCEDURE — 99207 PR NON-BILLABLE SERV PER CHARTING: CPT | Performed by: EMERGENCY MEDICINE

## 2023-09-25 RX ORDER — PREDNISONE 50 MG/1
50 TABLET ORAL DAILY
Qty: 5 TABLET | Refills: 0 | Status: SHIPPED | OUTPATIENT
Start: 2023-09-25 | End: 2023-09-30

## 2023-09-25 RX ORDER — AZITHROMYCIN 250 MG/1
TABLET, FILM COATED ORAL
Qty: 6 TABLET | Refills: 0 | Status: SHIPPED | OUTPATIENT
Start: 2023-09-25 | End: 2023-09-30

## 2023-09-28 RX ORDER — DEXTROAMPHETAMINE SACCHARATE, AMPHETAMINE ASPARTATE MONOHYDRATE, DEXTROAMPHETAMINE SULFATE AND AMPHETAMINE SULFATE 5; 5; 5; 5 MG/1; MG/1; MG/1; MG/1
20 CAPSULE, EXTENDED RELEASE ORAL DAILY
Qty: 30 CAPSULE | Refills: 0 | Status: SHIPPED | OUTPATIENT
Start: 2023-09-28 | End: 2023-10-25

## 2023-09-28 RX ORDER — DEXTROAMPHETAMINE SACCHARATE, AMPHETAMINE ASPARTATE, DEXTROAMPHETAMINE SULFATE AND AMPHETAMINE SULFATE 1.25; 1.25; 1.25; 1.25 MG/1; MG/1; MG/1; MG/1
5 TABLET ORAL DAILY
Qty: 30 TABLET | Refills: 0 | Status: SHIPPED | OUTPATIENT
Start: 2023-10-28 | End: 2023-10-09

## 2023-10-09 DIAGNOSIS — Z79.899 CONTROLLED SUBSTANCE AGREEMENT SIGNED: ICD-10-CM

## 2023-10-09 DIAGNOSIS — F98.8 ATTENTION DEFICIT DISORDER, UNSPECIFIED HYPERACTIVITY PRESENCE: ICD-10-CM

## 2023-10-09 RX ORDER — DEXTROAMPHETAMINE SACCHARATE, AMPHETAMINE ASPARTATE, DEXTROAMPHETAMINE SULFATE AND AMPHETAMINE SULFATE 1.25; 1.25; 1.25; 1.25 MG/1; MG/1; MG/1; MG/1
5 TABLET ORAL DAILY
Qty: 30 TABLET | Refills: 0 | Status: SHIPPED | OUTPATIENT
Start: 2023-10-28

## 2023-10-25 ENCOUNTER — VIRTUAL VISIT (OUTPATIENT)
Dept: FAMILY MEDICINE | Facility: CLINIC | Age: 58
End: 2023-10-25
Payer: COMMERCIAL

## 2023-10-25 DIAGNOSIS — F98.8 ATTENTION DEFICIT DISORDER, UNSPECIFIED HYPERACTIVITY PRESENCE: ICD-10-CM

## 2023-10-25 PROCEDURE — 99213 OFFICE O/P EST LOW 20 MIN: CPT | Mod: VID | Performed by: FAMILY MEDICINE

## 2023-10-25 RX ORDER — DEXTROAMPHETAMINE SACCHARATE, AMPHETAMINE ASPARTATE MONOHYDRATE, DEXTROAMPHETAMINE SULFATE AND AMPHETAMINE SULFATE 5; 5; 5; 5 MG/1; MG/1; MG/1; MG/1
20 CAPSULE, EXTENDED RELEASE ORAL DAILY
Qty: 30 CAPSULE | Refills: 0 | Status: SHIPPED | OUTPATIENT
Start: 2023-10-28

## 2023-10-25 ASSESSMENT — ASTHMA QUESTIONNAIRES
QUESTION_1 LAST FOUR WEEKS HOW MUCH OF THE TIME DID YOUR ASTHMA KEEP YOU FROM GETTING AS MUCH DONE AT WORK, SCHOOL OR AT HOME: NONE OF THE TIME
QUESTION_2 LAST FOUR WEEKS HOW OFTEN HAVE YOU HAD SHORTNESS OF BREATH: NOT AT ALL
QUESTION_4 LAST FOUR WEEKS HOW OFTEN HAVE YOU USED YOUR RESCUE INHALER OR NEBULIZER MEDICATION (SUCH AS ALBUTEROL): ONCE A WEEK OR LESS
QUESTION_3 LAST FOUR WEEKS HOW OFTEN DID YOUR ASTHMA SYMPTOMS (WHEEZING, COUGHING, SHORTNESS OF BREATH, CHEST TIGHTNESS OR PAIN) WAKE YOU UP AT NIGHT OR EARLIER THAN USUAL IN THE MORNING: NOT AT ALL
ACT_TOTALSCORE: 24
QUESTION_5 LAST FOUR WEEKS HOW WOULD YOU RATE YOUR ASTHMA CONTROL: COMPLETELY CONTROLLED
ACT_TOTALSCORE: 24

## 2023-10-25 NOTE — PROGRESS NOTES
"Sterling is a 58 year old who is being evaluated via a billable video visit.      How would you like to obtain your AVS? MyChart  If the video visit is dropped, the invitation should be resent by: Text to cell phone: [phone]  Will anyone else be joining your video visit? No          Assessment & Plan     Attention deficit disorder, unspecified hyperactivity presence  Patient does not have any side effects.  Medication refilled future refills from the PCP.  - amphetamine-dextroamphetamine (ADDERALL XR) 20 MG 24 hr capsule; Take 1 capsule (20 mg) by mouth daily         BMI:   Estimated body mass index is 35.07 kg/m  as calculated from the following:    Height as of 4/26/23: 1.842 m (6' 0.5\").    Weight as of 4/26/23: 118.9 kg (262 lb 3.2 oz).       Charlie Kingsley MD  [facility]    Dulce Katz is a 58 year old, presenting for the following health issues:  No chief complaint on file.        10/25/2023     1:13 PM   Additional Questions   Roomed by Lavonne JONES     Medication Followup of Adderall  Taking Medication as prescribed: yes  Side Effects:  None  Medication Helping Symptoms:  yes    Patient is currently taking Adderall 20 mg extended.  5 mg on the days when he  goes toworks.  Denies any chest pains no shortness of breath denies any palpitations.      Review of Systems   Constitutional, HEENT, cardiovascular, pulmonary, gi and gu systems are negative, except as otherwise noted.      Objective           Vitals:  No vitals were obtained today due to virtual visit.    Physical Exam   GENERAL: Healthy, alert and no distress  EYES: Eyes grossly normal to inspection.  No discharge or erythema, or obvious scleral/conjunctival abnormalities.  RESP: No audible wheeze, cough, or visible cyanosis.  No visible retractions or increased work of breathing.    SKIN: Visible skin clear. No significant rash, abnormal pigmentation or lesions.  NEURO: Cranial nerves grossly intact.  Mentation " and speech appropriate for age.  PSYCH: Mentation appears normal, affect normal/bright, judgement and insight intact, normal speech and appearance well-groomed.            Video-Visit Details    Type of service:  Video Visit     Originating Location (pt. Location): Home    Distant Location (provider location):  On-site  Platform used for Video Visit: gdgt

## 2023-10-26 ENCOUNTER — PATIENT OUTREACH (OUTPATIENT)
Dept: GASTROENTEROLOGY | Facility: CLINIC | Age: 58
End: 2023-10-26
Payer: COMMERCIAL

## 2023-10-30 ENCOUNTER — VIRTUAL VISIT (OUTPATIENT)
Dept: FAMILY MEDICINE | Facility: CLINIC | Age: 58
End: 2023-10-30
Payer: COMMERCIAL

## 2023-10-30 DIAGNOSIS — F98.8 ATTENTION DEFICIT DISORDER, UNSPECIFIED HYPERACTIVITY PRESENCE: ICD-10-CM

## 2023-10-30 DIAGNOSIS — N52.9 ERECTILE DYSFUNCTION, UNSPECIFIED ERECTILE DYSFUNCTION TYPE: ICD-10-CM

## 2023-10-30 DIAGNOSIS — J45.41 MODERATE PERSISTENT ASTHMA WITH EXACERBATION: Primary | ICD-10-CM

## 2023-10-30 DIAGNOSIS — J30.89 SEASONAL ALLERGIC RHINITIS DUE TO OTHER ALLERGIC TRIGGER: ICD-10-CM

## 2023-10-30 DIAGNOSIS — J45.40 MODERATE PERSISTENT ASTHMA, UNCOMPLICATED: ICD-10-CM

## 2023-10-30 PROCEDURE — 99214 OFFICE O/P EST MOD 30 MIN: CPT | Mod: VID | Performed by: FAMILY MEDICINE

## 2023-10-30 RX ORDER — SILDENAFIL 25 MG/1
25-50 TABLET, FILM COATED ORAL DAILY PRN
Qty: 30 TABLET | Refills: 2 | Status: SHIPPED | OUTPATIENT
Start: 2023-10-30

## 2023-10-30 RX ORDER — DEXTROAMPHETAMINE SACCHARATE, AMPHETAMINE ASPARTATE, DEXTROAMPHETAMINE SULFATE AND AMPHETAMINE SULFATE 1.25; 1.25; 1.25; 1.25 MG/1; MG/1; MG/1; MG/1
5 TABLET ORAL DAILY
Qty: 30 TABLET | Refills: 0 | Status: SHIPPED | OUTPATIENT
Start: 2023-12-28 | End: 2024-01-27

## 2023-10-30 RX ORDER — ALBUTEROL SULFATE 90 UG/1
2 AEROSOL, METERED RESPIRATORY (INHALATION) EVERY 4 HOURS PRN
Qty: 18 G | Refills: 2 | Status: SHIPPED | OUTPATIENT
Start: 2023-10-30

## 2023-10-30 RX ORDER — MONTELUKAST SODIUM 10 MG/1
1 TABLET ORAL AT BEDTIME
Qty: 90 TABLET | Refills: 1 | Status: SHIPPED | OUTPATIENT
Start: 2023-10-30

## 2023-10-30 RX ORDER — DEXTROAMPHETAMINE SACCHARATE, AMPHETAMINE ASPARTATE MONOHYDRATE, DEXTROAMPHETAMINE SULFATE AND AMPHETAMINE SULFATE 5; 5; 5; 5 MG/1; MG/1; MG/1; MG/1
20 CAPSULE, EXTENDED RELEASE ORAL DAILY
Qty: 30 CAPSULE | Refills: 0 | Status: SHIPPED | OUTPATIENT
Start: 2023-12-28 | End: 2024-01-27

## 2023-10-30 RX ORDER — DEXTROAMPHETAMINE SACCHARATE, AMPHETAMINE ASPARTATE MONOHYDRATE, DEXTROAMPHETAMINE SULFATE AND AMPHETAMINE SULFATE 5; 5; 5; 5 MG/1; MG/1; MG/1; MG/1
20 CAPSULE, EXTENDED RELEASE ORAL DAILY
Qty: 30 CAPSULE | Refills: 0 | Status: SHIPPED | OUTPATIENT
Start: 2024-01-29 | End: 2024-02-28

## 2023-10-30 RX ORDER — DEXTROAMPHETAMINE SACCHARATE, AMPHETAMINE ASPARTATE, DEXTROAMPHETAMINE SULFATE AND AMPHETAMINE SULFATE 1.25; 1.25; 1.25; 1.25 MG/1; MG/1; MG/1; MG/1
5 TABLET ORAL DAILY
Qty: 30 TABLET | Refills: 0 | Status: SHIPPED | OUTPATIENT
Start: 2024-01-28 | End: 2024-02-27

## 2023-10-30 RX ORDER — DEXTROAMPHETAMINE SACCHARATE, AMPHETAMINE ASPARTATE, DEXTROAMPHETAMINE SULFATE AND AMPHETAMINE SULFATE 1.25; 1.25; 1.25; 1.25 MG/1; MG/1; MG/1; MG/1
5 TABLET ORAL DAILY
Qty: 30 TABLET | Refills: 0 | Status: SHIPPED | OUTPATIENT
Start: 2023-11-29 | End: 2023-12-29

## 2023-10-30 RX ORDER — FLUTICASONE PROPIONATE AND SALMETEROL 500; 50 UG/1; UG/1
1 POWDER RESPIRATORY (INHALATION) 2 TIMES DAILY
Qty: 3 EACH | Refills: 1 | Status: SHIPPED | OUTPATIENT
Start: 2023-10-30

## 2023-10-30 RX ORDER — DEXTROAMPHETAMINE SACCHARATE, AMPHETAMINE ASPARTATE MONOHYDRATE, DEXTROAMPHETAMINE SULFATE AND AMPHETAMINE SULFATE 5; 5; 5; 5 MG/1; MG/1; MG/1; MG/1
20 CAPSULE, EXTENDED RELEASE ORAL DAILY
Qty: 30 CAPSULE | Refills: 0 | Status: SHIPPED | OUTPATIENT
Start: 2023-11-28 | End: 2023-12-28

## 2023-10-30 RX ORDER — FLUTICASONE PROPIONATE 50 MCG
1-2 SPRAY, SUSPENSION (ML) NASAL DAILY
Qty: 1 G | Refills: 11 | Status: SHIPPED | OUTPATIENT
Start: 2023-10-30

## 2023-10-30 RX ORDER — PREDNISONE 20 MG/1
20 TABLET ORAL 2 TIMES DAILY
Qty: 10 TABLET | Refills: 0 | Status: SHIPPED | OUTPATIENT
Start: 2023-10-30 | End: 2023-11-04

## 2023-10-30 RX ORDER — FLUTICASONE PROPIONATE AND SALMETEROL 500; 50 UG/1; UG/1
1 POWDER RESPIRATORY (INHALATION) 2 TIMES DAILY
Qty: 3 EACH | Refills: 1 | Status: CANCELLED | OUTPATIENT
Start: 2023-10-30

## 2023-10-30 ASSESSMENT — ENCOUNTER SYMPTOMS: WHEEZING: 1

## 2023-10-30 NOTE — PROGRESS NOTES
Sterling is a 58 year old who is being evaluated via a billable video visit.      How would you like to obtain your AVS? MyChart  If the video visit is dropped, the invitation should be resent by: Send to e-mail at: [email or web address]  Will anyone else be joining your video visit? No          Assessment & Plan     (J45.41) Moderate persistent asthma with exacerbation  (primary encounter diagnosis)  Comment: he prefers to go back on wixela- as it worked better then Symbicort  Plan: fluticasone-salmeterol (WIXELA INHUB) 500-50         MCG/ACT inhaler, predniSONE (DELTASONE) 20 MG         tablet            (J30.89) Seasonal allergic rhinitis due to other allergic trigger  Comment: need refill.   Plan: fluticasone (FLONASE) 50 MCG/ACT nasal spray        Discussed allergy/ asthma and treatment. gave oral  prednisone x 5 days  Willing to go back on wixela as it worked better then Symbicort   . Also continue with  Singular/ allergy med's as needed . Refill give on Flonase and albuterol to use as needed. Need to monitor the need for albuterol . Cares and symptomatic treatment discussed follow up  In 4 weeks if no improvement but sooner, if problem        (N52.9) Erectile dysfunction, unspecified erectile dysfunction type  Comment: stable,need refill   Plan: sildenafil (VIAGRA) 25 MG tablet              (F98.8) Attention deficit disorder, unspecified hyperactivity presence  Comment:   Plan: amphetamine-dextroamphetamine (ADDERALL XR) 20         MG 24 hr capsule, amphetamine-dextroamphetamine        (ADDERALL XR) 20 MG 24 hr capsule,         amphetamine-dextroamphetamine (ADDERALL XR) 20         MG 24 hr capsule, amphetamine-dextroamphetamine        (ADDERALL) 5 MG tablet,         amphetamine-dextroamphetamine (ADDERALL) 5 MG         tablet, amphetamine-dextroamphetamine         (ADDERALL) 5 MG tablet          He is doing well. Extra refills were sent for additional 3 months        Check labs. refill sent.Cares and   "treatment discussed follow. up if problem   Patient expressed understanding and agreement with treatment plan. All patient's questions were answered, will let me know if has more later.  Medications: Rx's: Reviewed the potential side effects/complications of medications prescribed.        BMI:   Estimated body mass index is 35.07 kg/m  as calculated from the following:    Height as of 4/26/23: 1.842 m (6' 0.5\").    Weight as of 4/26/23: 118.9 kg (262 lb 3.2 oz).         Kay Ramirez MD  [facility] LUCIUS Katz is a 58 year old, presenting for the following health issues:  Follow Up (Visit from 09/25 regarding cough and congestion. Pt states he was given medication that has helped, however, symptoms are now back.  ), Wheezing, and Refill Request        10/30/2023     8:38 AM   Additional Questions   Roomed by Judie TILLMAN       History of Present Illness     Asthma:  He presents for follow up of asthma.  He has some cough, some wheezing, and no shortness of breath.  He is using a relief medication every 4 hours. He does not miss any doses of his controller medication throughout the week. Patient is aware of the following triggers: same as previous visit and pollens. The patient has not had a visit to the Emergency Room, Urgent Care or Hospital due to asthma since the last clinic visit.     He eats 2-3 servings of fruits and vegetables daily.He consumes 0 sweetened beverage(s) daily.He exercises with enough effort to increase his heart rate 30 to 60 minutes per day.  He exercises with enough effort to increase his heart rate 5 days per week.   He is taking medications regularly.     I was treated on Sept 25 for a cough, congestion and wheezing with prednisone and a Z Pack few weeks . He was doing better but he feels like his  symptoms have started up again. He thinsk it is mostly allergies that are triggering sx and he is taking Singulair and Flonase. But he thins his previous " steroid inhaler ( wixella 500 ) was working better then Symbicort, so he wants to go back on it. His insurance also has changed so he is hoping may be coverage not an issue   He already  had his  COVID booster and Flu shot in early October . have also tested negative for COVID again so not concerned about Covid     His add med's have been working fine. And want additional scripts   He also want refill on Viagra , works well. No problem taking any medications otherwise          Review of Systems   Respiratory:  Positive for wheezing.       Constitutional, HEENT, cardiovascular, pulmonary, GI, , musculoskeletal, neuro, skin, endocrine and psych systems are negative, except as otherwise noted.      Objective    Vitals - Patient Reported  Pain Score: No Pain (0)        Physical Exam   GENERAL: Healthy, alert and no distress  EYES: Eyes grossly normal to inspection.  No discharge or erythema, or obvious scleral/conjunctival abnormalities.  RESP: No audible wheeze, cough, or visible cyanosis.  No visible retractions or increased work of breathing.    SKIN: Visible skin clear. No significant rash, abnormal pigmentation or lesions.  NEURO: Cranial nerves grossly intact.  Mentation and speech appropriate for age.  PSYCH: Mentation appears normal, affect normal/bright, judgement and insight intact, normal speech and appearance well-groomed.              Video-Visit Details    Type of service:  Video Visit     Originating Location (pt. Location): Home    Distant Location (provider location):  Off-site  Platform used for Video Visit: Noelle

## 2023-11-02 DIAGNOSIS — J45.40 MODERATE PERSISTENT ASTHMA, UNCOMPLICATED: ICD-10-CM

## 2023-11-02 DIAGNOSIS — J45.41 MODERATE PERSISTENT ASTHMA WITH EXACERBATION: ICD-10-CM

## 2023-11-02 RX ORDER — FLUTICASONE PROPIONATE AND SALMETEROL 500; 50 UG/1; UG/1
1 POWDER RESPIRATORY (INHALATION) 2 TIMES DAILY
Qty: 3 EACH | Refills: 1 | OUTPATIENT
Start: 2023-11-02

## 2023-12-04 ENCOUNTER — ANCILLARY PROCEDURE (OUTPATIENT)
Dept: GENERAL RADIOLOGY | Facility: CLINIC | Age: 58
End: 2023-12-04
Payer: COMMERCIAL

## 2023-12-04 ENCOUNTER — OFFICE VISIT (OUTPATIENT)
Dept: INTERNAL MEDICINE | Facility: CLINIC | Age: 58
End: 2023-12-04
Payer: COMMERCIAL

## 2023-12-04 VITALS
SYSTOLIC BLOOD PRESSURE: 151 MMHG | BODY MASS INDEX: 34.67 KG/M2 | OXYGEN SATURATION: 97 % | HEART RATE: 60 BPM | HEIGHT: 72 IN | WEIGHT: 256 LBS | TEMPERATURE: 97.7 F | DIASTOLIC BLOOD PRESSURE: 83 MMHG | RESPIRATION RATE: 16 BRPM

## 2023-12-04 DIAGNOSIS — M79.89 SWELLING OF LEFT LITTLE FINGER: Primary | ICD-10-CM

## 2023-12-04 DIAGNOSIS — M79.89 SWELLING OF LEFT LITTLE FINGER: ICD-10-CM

## 2023-12-04 PROCEDURE — 73130 X-RAY EXAM OF HAND: CPT | Mod: TC | Performed by: RADIOLOGY

## 2023-12-04 PROCEDURE — 99213 OFFICE O/P EST LOW 20 MIN: CPT

## 2023-12-04 NOTE — PROGRESS NOTES
"  Assessment & Plan     (M79.89) Swelling of left little finger  (primary encounter diagnosis)  Comment: Left pinky finger swelling x 2-3 weeks. Denies any pain. Initially c/o mild stiffness and discomfort but this has since resolved.  Denies any injury which may have caused this. Does endorse mild decrease in ROM.  Has difficulty straightening the joint. Denies any catching or locking sensation.  He works in sales.  Will order x-ray today. Will send referral to hand specialist if needed.  Plan: XR Hand Left G/E 3 Views                 BMI:   Estimated body mass index is 35.17 kg/m  as calculated from the following:    Height as of this encounter: 1.817 m (5' 11.54\").    Weight as of this encounter: 116.1 kg (256 lb).   Weight management plan: Discussed healthy diet and exercise guidelines        LEILANI Weston CNP  [facility] ALDO Katz is a 58 year old, presenting for the following health issues:  Hand Injury      History of Present Illness       Reason for visit:  Small finger on left hand is swollen and seems to have a lump.    He eats 2-3 servings of fruits and vegetables daily.He consumes 0 sweetened beverage(s) daily.He exercises with enough effort to increase his heart rate 30 to 60 minutes per day.  He exercises with enough effort to increase his heart rate 5 days per week.   He is taking medications regularly.         Check right little finger - swelling             Objective    BP (!) 151/83   Pulse 60   Temp 97.7  F (36.5  C) (Oral)   Resp 16   Ht 1.817 m (5' 11.54\")   Wt 116.1 kg (256 lb)   SpO2 97%   BMI 35.17 kg/m    Body mass index is 35.17 kg/m .                        "

## 2023-12-13 ENCOUNTER — TRANSFERRED RECORDS (OUTPATIENT)
Dept: HEALTH INFORMATION MANAGEMENT | Facility: CLINIC | Age: 58
End: 2023-12-13
Payer: COMMERCIAL

## 2024-03-27 ENCOUNTER — PATIENT OUTREACH (OUTPATIENT)
Dept: CARE COORDINATION | Facility: CLINIC | Age: 59
End: 2024-03-27
Payer: COMMERCIAL

## 2024-04-10 ENCOUNTER — PATIENT OUTREACH (OUTPATIENT)
Dept: CARE COORDINATION | Facility: CLINIC | Age: 59
End: 2024-04-10
Payer: COMMERCIAL

## 2024-06-22 ENCOUNTER — HEALTH MAINTENANCE LETTER (OUTPATIENT)
Age: 59
End: 2024-06-22

## 2025-07-12 ENCOUNTER — HEALTH MAINTENANCE LETTER (OUTPATIENT)
Age: 60
End: 2025-07-12

## (undated) RX ORDER — FENTANYL CITRATE 50 UG/ML
INJECTION, SOLUTION INTRAMUSCULAR; INTRAVENOUS
Status: DISPENSED
Start: 2021-01-14